# Patient Record
Sex: MALE | Race: WHITE | NOT HISPANIC OR LATINO | Employment: OTHER | URBAN - METROPOLITAN AREA
[De-identification: names, ages, dates, MRNs, and addresses within clinical notes are randomized per-mention and may not be internally consistent; named-entity substitution may affect disease eponyms.]

---

## 2017-01-04 ENCOUNTER — TELEPHONE (OUTPATIENT)
Dept: FAMILY MEDICINE | Facility: CLINIC | Age: 62
End: 2017-01-04

## 2017-01-04 DIAGNOSIS — R73.9 HYPERGLYCEMIA: Primary | ICD-10-CM

## 2017-01-04 NOTE — TELEPHONE ENCOUNTER
Your lipid profile was normal. Your hepatitis C test was normal. Your liver test were elevated for an undetermined cause. Your thyroid test was normal.      Your testosterone level was normal. Your glucose was 240 which is in the diabetes range. I suggest a Hgb A1C for further evaluation of this finding.

## 2017-01-24 ENCOUNTER — TELEPHONE (OUTPATIENT)
Dept: FAMILY MEDICINE | Facility: CLINIC | Age: 62
End: 2017-01-24

## 2017-01-24 DIAGNOSIS — E11.65 TYPE 2 DIABETES MELLITUS WITH HYPERGLYCEMIA, WITHOUT LONG-TERM CURRENT USE OF INSULIN: Primary | ICD-10-CM

## 2017-01-24 RX ORDER — METFORMIN HYDROCHLORIDE 850 MG/1
850 TABLET ORAL 2 TIMES DAILY WITH MEALS
Qty: 60 TABLET | Refills: 12 | Status: SHIPPED | OUTPATIENT
Start: 2017-01-24 | End: 2017-01-24 | Stop reason: SDUPTHER

## 2017-01-24 RX ORDER — METFORMIN HYDROCHLORIDE 850 MG/1
850 TABLET ORAL 2 TIMES DAILY WITH MEALS
Qty: 60 TABLET | Refills: 12 | Status: SHIPPED | OUTPATIENT
Start: 2017-01-24 | End: 2018-05-06 | Stop reason: ALTCHOICE

## 2017-01-24 NOTE — TELEPHONE ENCOUNTER
I sent a prescription to your pharmacy. He is due for a Hgb A1C. I suggest a referral to the diabetes program.

## 2017-01-24 NOTE — TELEPHONE ENCOUNTER
----- Message from Oma Vasquez sent at 1/24/2017 11:04 AM CST -----  Wife//edmund   [phone]  Wife calling about blood sugar is high    Wants to speak to doctor or nurse about a chronic cough

## 2017-01-24 NOTE — TELEPHONE ENCOUNTER
Patient has been monitoring his blood sugar: yesterday and this am reading was 288, both days prior to eating. And for two weeks, patient has been eating better and watching what he eats. Pt will begin taking organic [place] cinnamon 1,000mg capsules, twice daily. Patient wants to know what type to treatment do you want to start him on, please advise.

## 2017-07-21 ENCOUNTER — OFFICE VISIT (OUTPATIENT)
Dept: URGENT CARE | Facility: CLINIC | Age: 62
End: 2017-07-21
Payer: COMMERCIAL

## 2017-07-21 VITALS
BODY MASS INDEX: 31.83 KG/M2 | SYSTOLIC BLOOD PRESSURE: 133 MMHG | HEART RATE: 82 BPM | OXYGEN SATURATION: 98 % | HEIGHT: 72 IN | TEMPERATURE: 99 F | WEIGHT: 235 LBS | RESPIRATION RATE: 16 BRPM | DIASTOLIC BLOOD PRESSURE: 88 MMHG

## 2017-07-21 DIAGNOSIS — M62.838 MUSCLE SPASM OF RIGHT SHOULDER: Primary | ICD-10-CM

## 2017-07-21 DIAGNOSIS — M62.838 NECK MUSCLE SPASM: ICD-10-CM

## 2017-07-21 PROCEDURE — 96372 THER/PROPH/DIAG INJ SC/IM: CPT | Mod: S$GLB,,, | Performed by: PHYSICIAN ASSISTANT

## 2017-07-21 PROCEDURE — 99214 OFFICE O/P EST MOD 30 MIN: CPT | Mod: 25,S$GLB,, | Performed by: PHYSICIAN ASSISTANT

## 2017-07-21 RX ORDER — METHOCARBAMOL 500 MG/1
500 TABLET, FILM COATED ORAL 4 TIMES DAILY
Qty: 30 TABLET | Refills: 0 | Status: SHIPPED | OUTPATIENT
Start: 2017-07-21 | End: 2017-07-31

## 2017-07-21 RX ORDER — KETOROLAC TROMETHAMINE 30 MG/ML
60 INJECTION, SOLUTION INTRAMUSCULAR; INTRAVENOUS
Status: DISCONTINUED | OUTPATIENT
Start: 2017-07-21 | End: 2017-07-21

## 2017-07-21 RX ORDER — KETOROLAC TROMETHAMINE 30 MG/ML
60 INJECTION, SOLUTION INTRAMUSCULAR; INTRAVENOUS
Status: COMPLETED | OUTPATIENT
Start: 2017-07-21 | End: 2017-07-21

## 2017-07-21 RX ORDER — NAPROXEN 500 MG/1
500 TABLET ORAL 2 TIMES DAILY WITH MEALS
Qty: 60 TABLET | Refills: 0 | Status: SHIPPED | OUTPATIENT
Start: 2017-07-21 | End: 2017-08-20

## 2017-07-21 RX ADMIN — KETOROLAC TROMETHAMINE 60 MG: 30 INJECTION, SOLUTION INTRAMUSCULAR; INTRAVENOUS at 06:07

## 2017-07-21 NOTE — PATIENT INSTRUCTIONS
Muscle Spasm  A muscle spasm (also called a cramp) is an involuntary muscle contraction. The muscle tightens quickly and strongly. A hard lump may form in the muscle. Muscle spasms are very painful. Read on to learn more about muscle spasms and how to treat and prevent them.    What causes muscles to spasm?  Often, the cause of a muscle spasm is not known. Muscle spasm is due to irritation of muscle fibers. Some things can make a muscle spasm more likely. These include:  · Injury  · Heavy exercise  · Overtired muscles  · A muscle held in one position for a long time  · Dehydration  · Low levels of certain minerals in the body  · Taking certain medications, such as diuretics or water pills  · Certain medical conditions, such as kidney failure or diabetes  · Being pregnant  Stopping a muscle spasm  Muscle spasms often come and go quickly. When a muscle goes into spasm, very gently stretch and massage the muscle. This may help calm the muscle fibers. Then rest the muscle.  Preventing muscle spasms  Although there is little or no evidence that staying hydrated, taking certain vitamins or minerals or stretching works to prevent cramps, these measures may help and have other benefits. Talk to your health care provider about steps to take to avoid muscle spasms. These may include:  · Drinking enough fluids to avoid dehydration, especially when you exercise.  · Taking vitamin or mineral supplements.  · Getting regular exercise.  · Stretching regularly, especially before exercise.  · Limit caffeine and smoking.  · Taking a prescription muscle relaxant.  When to call your doctor  Call your doctor if you have any of the following:  · Severe cramping  · Cramping that lasts a long time, does not go away with stretching, or keeps coming back  · Pain, tingling, or weakness in the arms or legs  · Pain that wakes you up at night   Date Last Reviewed: 9/1/2015  © 3526-2169 [employer]. [address] [place] [address], [facility]  [place] [address]. All rights reserved. This information is not intended as a substitute for professional medical care. Always follow your healthcare professional's instructions.        Neck Spasm    A spasm of the neck muscles can happen after a sudden awkward neck movement. Sleeping with your neck in a crooked position can also cause spasm. Some people respond to emotional stress by tensing the muscles of their neck, shoulders, and upper back. If neck spasm lasts long enough, it can cause headache.  The treatment described below will usually help the pain to go away in 5 to 7 days. Pain that continues may need further evaluation or other types of treatment such as physical therapy.  Home care  · Rest and relax the muscles. Use a comfortable pillow that supports the head and keeps the spine in a neutral position. The position of the head should not be tilted forward or backward. A rolled up towel may help for a custom fit.  · Some people find relief with heat. Heat can be applied with either a warm shower or bath or a moist towel heated in the microwave and massage. Others prefer cold packs. You can make an ice pack by filling a plastic bag that seals at the top with ice cubes or crushed ice and then wrapping it with a thin towel. Try both and use the method that feels best for 15 to 20 minutes, several times a day.  · Whether using ice or heat, be careful that you do not injure your skin. Never put ice directly on the skin. Always wrap the ice in a towel or other type of cloth. This is very important, especially in people with poor skin sensations.  · Try to reduce your stress level. Emotional stress can lead to neck muscle tension and get in the way of or delay the healing process.  · You may use over-the-counter pain medicine to control pain, unless another medicine was prescribed.If you have chronic liver or kidney disease or ever had a stomach ulcer or GI bleeding, talk with your healthcare provider before using these  medicines.  Follow-up care  Follow up with your healthcare provider if your symptoms do not show signs of improvement after one week. Physical therapy or further tests may be needed.  If X-rays, CT scans, or MRI scans were taken, you will be told of any new findings that may affect your care.  Call 911  Call 911 if you have:  · Sudden weakness or numbness in one or both arms  · Neck swelling, difficulty or painful swallowing  · Difficulty breathing  · Chest pain  When to seek medical advice  Call your healthcare provider right away if any of these occur:  · Pain becomes worse or spreads into one or both arms  · Increasing headache with nausea or vomiting  · Fever of 100.4°F (38°C) or above lasting for 24 to 48 hours  Date Last Reviewed: 11/21/2015 © 2000-2016 [employer]. [address] [place] [address], [place] [address]. All rights reserved. This information is not intended as a substitute for professional medical care. Always follow your healthcare professional's instructions.      Please follow up with your Primary care provider within 2-5 days if your signs ans symptoms have not resolved or worsen.     If your condition worsens or fails to improve we recommend that you receive another evaluation at the emergency room immediately or contact your primary medical clinic to discuss your concerns.   You must understand that you have received an Urgent Care treatment only and that you may be released before all of your medical problems are known or treated. You, the patient, will arrange for follow up care as instructed.       Discussed with patient that he will wait to begin Naproxen tomorrow.

## 2017-07-21 NOTE — PROGRESS NOTES
Subjective:       Patient ID: Sj Gonzalez is a 62 y.o. male.    Vitals:  height is 6' (1.829 m) and weight is 106.6 kg (235 lb). His oral temperature is 99.2 °F (37.3 °C). His blood pressure is 133/88 and his pulse is 82. His respiration is 16 and oxygen saturation is 98%.     Chief Complaint: Neck Pain and Shoulder Pain    Neck Pain    This is a new problem. The current episode started yesterday. The problem occurs daily. The problem has been gradually worsening. The pain is associated with lifting a heavy object. The pain is present in the right side. The quality of the pain is described as aching and burning. The pain is at a severity of 10/10. The symptoms are aggravated by bending, position and twisting. The pain is worse during the day. Stiffness is present all day. Pertinent negatives include no chest pain, fever or headaches. He has tried ice and chiropractic manipulation for the symptoms. The treatment provided mild relief.   Shoulder Pain    The pain is present in the neck and right shoulder. This is a new problem. The current episode started yesterday. There has been no history of extremity trauma. The problem occurs daily. The problem has been gradually worsening. The pain is at a severity of 10/10. The pain is severe. Pertinent negatives include no fever or headaches. The symptoms are aggravated by activity and standing. He has tried rest and cold for the symptoms. The treatment provided mild relief.     Review of Systems   Constitution: Negative for chills and fever.   HENT: Negative for headaches and sore throat.    Eyes: Negative for blurred vision.   Cardiovascular: Negative for chest pain.   Respiratory: Negative for shortness of breath.    Skin: Negative for rash.   Musculoskeletal: Positive for joint pain and neck pain. Negative for back pain.   Gastrointestinal: Negative for abdominal pain, diarrhea, nausea and vomiting.   Psychiatric/Behavioral: The patient is not nervous/anxious.         Objective:      Physical Exam   Constitutional: He is oriented to person, place, and time. Vital signs are normal. He appears well-developed and well-nourished. He is active and cooperative.  Non-toxic appearance. He does not appear ill. No distress.   HENT:   Head: Normocephalic and atraumatic.   Right Ear: Hearing, tympanic membrane, external ear and ear canal normal.   Left Ear: Hearing, tympanic membrane, external ear and ear canal normal.   Nose: Nose normal. No mucosal edema, rhinorrhea or nasal deformity. No epistaxis. Right sinus exhibits no maxillary sinus tenderness and no frontal sinus tenderness. Left sinus exhibits no maxillary sinus tenderness and no frontal sinus tenderness.   Mouth/Throat: Uvula is midline, oropharynx is clear and moist and mucous membranes are normal. No trismus in the jaw. Normal dentition. No uvula swelling. No posterior oropharyngeal erythema.   Eyes: Conjunctivae and lids are normal. Right eye exhibits no discharge. Left eye exhibits no discharge. No scleral icterus.   Sclera clear bilat   Neck: Trachea normal, normal range of motion, full passive range of motion without pain and phonation normal. Neck supple. Muscular tenderness present. No spinous process tenderness present. No neck rigidity. No edema, no erythema and normal range of motion present. No Brudzinski's sign and no Kernig's sign noted.       Cardiovascular: Normal rate, regular rhythm, normal heart sounds, intact distal pulses and normal pulses.    Pulmonary/Chest: Effort normal and breath sounds normal. No respiratory distress.   Abdominal: Soft. Normal appearance and bowel sounds are normal. He exhibits no distension, no abdominal bruit, no pulsatile midline mass and no mass. There is no tenderness.   Musculoskeletal: Normal range of motion. He exhibits no edema or deformity.   Neurological: He is alert and oriented to person, place, and time. He has normal strength and normal reflexes. No sensory deficit. He  exhibits normal muscle tone. Coordination normal.   Skin: Skin is warm, dry and intact. He is not diaphoretic. No pallor.   Psychiatric: He has a normal mood and affect. His speech is normal and behavior is normal. Judgment and thought content normal. Cognition and memory are normal.   Nursing note and vitals reviewed.      Assessment:       1. Muscle spasm of right shoulder    2. Neck muscle spasm        Plan:         Muscle spasm of right shoulder  -     Discontinue: ketorolac injection 60 mg; Inject 2 mLs (60 mg total) into the muscle one time.  -     methocarbamol (ROBAXIN) 500 MG Tab; Take 1 tablet (500 mg total) by mouth 4 (four) times daily. As needed for muscle spasm. May cause drowsiness  Dispense: 30 tablet; Refill: 0  -     naproxen (NAPROSYN) 500 MG tablet; Take 1 tablet (500 mg total) by mouth 2 (two) times daily with meals.  Dispense: 60 tablet; Refill: 0  -     ketorolac injection 60 mg; Inject 2 mLs (60 mg total) into the muscle one time.    Neck muscle spasm  -     Discontinue: ketorolac injection 60 mg; Inject 2 mLs (60 mg total) into the muscle one time.  -     methocarbamol (ROBAXIN) 500 MG Tab; Take 1 tablet (500 mg total) by mouth 4 (four) times daily. As needed for muscle spasm. May cause drowsiness  Dispense: 30 tablet; Refill: 0  -     naproxen (NAPROSYN) 500 MG tablet; Take 1 tablet (500 mg total) by mouth 2 (two) times daily with meals.  Dispense: 60 tablet; Refill: 0  -     ketorolac injection 60 mg; Inject 2 mLs (60 mg total) into the muscle one time.

## 2017-09-19 ENCOUNTER — OFFICE VISIT (OUTPATIENT)
Dept: URGENT CARE | Facility: CLINIC | Age: 62
End: 2017-09-19
Payer: COMMERCIAL

## 2017-09-19 VITALS
BODY MASS INDEX: 31.83 KG/M2 | SYSTOLIC BLOOD PRESSURE: 140 MMHG | WEIGHT: 235 LBS | HEART RATE: 85 BPM | RESPIRATION RATE: 18 BRPM | DIASTOLIC BLOOD PRESSURE: 79 MMHG | HEIGHT: 72 IN | TEMPERATURE: 98 F | OXYGEN SATURATION: 97 %

## 2017-09-19 DIAGNOSIS — M70.21 OLECRANON BURSITIS OF RIGHT ELBOW: Primary | ICD-10-CM

## 2017-09-19 DIAGNOSIS — J06.9 VIRAL URI WITH COUGH: ICD-10-CM

## 2017-09-19 PROCEDURE — 3008F BODY MASS INDEX DOCD: CPT | Mod: S$GLB,,, | Performed by: PHYSICIAN ASSISTANT

## 2017-09-19 PROCEDURE — 3078F DIAST BP <80 MM HG: CPT | Mod: S$GLB,,, | Performed by: PHYSICIAN ASSISTANT

## 2017-09-19 PROCEDURE — 96372 THER/PROPH/DIAG INJ SC/IM: CPT | Mod: S$GLB,,, | Performed by: PHYSICIAN ASSISTANT

## 2017-09-19 PROCEDURE — 3077F SYST BP >= 140 MM HG: CPT | Mod: S$GLB,,, | Performed by: PHYSICIAN ASSISTANT

## 2017-09-19 PROCEDURE — 99214 OFFICE O/P EST MOD 30 MIN: CPT | Mod: 25,S$GLB,, | Performed by: PHYSICIAN ASSISTANT

## 2017-09-19 RX ORDER — BETAMETHASONE SODIUM PHOSPHATE AND BETAMETHASONE ACETATE 3; 3 MG/ML; MG/ML
6 INJECTION, SUSPENSION INTRA-ARTICULAR; INTRALESIONAL; INTRAMUSCULAR; SOFT TISSUE
Status: COMPLETED | OUTPATIENT
Start: 2017-09-19 | End: 2017-09-19

## 2017-09-19 RX ORDER — KETOROLAC TROMETHAMINE 30 MG/ML
60 INJECTION, SOLUTION INTRAMUSCULAR; INTRAVENOUS
Status: COMPLETED | OUTPATIENT
Start: 2017-09-19 | End: 2017-09-19

## 2017-09-19 RX ORDER — CLINDAMYCIN HYDROCHLORIDE 300 MG/1
300 CAPSULE ORAL 3 TIMES DAILY
Qty: 21 CAPSULE | Refills: 0 | Status: SHIPPED | OUTPATIENT
Start: 2017-09-19 | End: 2017-09-26

## 2017-09-19 RX ADMIN — KETOROLAC TROMETHAMINE 60 MG: 30 INJECTION, SOLUTION INTRAMUSCULAR; INTRAVENOUS at 05:09

## 2017-09-19 RX ADMIN — BETAMETHASONE SODIUM PHOSPHATE AND BETAMETHASONE ACETATE 6 MG: 3; 3 INJECTION, SUSPENSION INTRA-ARTICULAR; INTRALESIONAL; INTRAMUSCULAR; SOFT TISSUE at 05:09

## 2017-09-19 NOTE — PATIENT INSTRUCTIONS
Bursitis of the Elbow (Olecranon)  Your elbow joint contains a small fluid-filled sac called a bursa. The bursa helps the muscles and tendons move smoothly over the bone. It also cushions and protects your elbow. Bursitis is when the bursa is inflamed or swollen. This is most often due to overuse of or injury to the elbow. Symptoms include swelling and pain. If the elbow is red and feels warm to the touch, the bursa itself may be infected.  In most cases, elbow bursitis resolves with medicine and self-care at home. It may take several weeks for the bursa to heal and the swelling to go away. In some cases, your healthcare provider may drain excess fluid from the bursa. Or, he or she may inject medicine directly into the bursa to help relieve symptoms. In severe cases, you may need surgery to remove the bursa may. If there is concern that the bursa is infected, your healthcare provider may prescribe antibiotics to treat the infection.    Home care  Your healthcare provider may prescribe medicine to help relieve pain and swelling. This may be an over-the-counter pain reliever or prescription pain medicine. Take all medicines as directed. To help treat or prevent infection, your provider may prescribe antibiotics. If these are prescribed, take them as directed until they are gone.  The following are general care guidelines:  · Apply an ice pack or bag of frozen peas wrapped in a thin towel to your elbow for 15 to 20 minutes at a time. Do this 3 to 4 times a day until pain and swelling improve.  · Keep your elbow raised above the level of your heart whenever possible. This helps reduce swelling. When sitting or lying down, place your arm on a pillow that rests on your chest or on a pillow at your side.  · Use an elastic wrap around the elbow joint to compress the area while it is healing. Make the wrap snug but not tight to the point of causing pain.  · Rest your elbow to give it time to heal. You may need to wear an  elbow pad to help protect and limit the movement of your elbow. During and after healing, avoid leaning on your elbows.  Follow-up care  Follow up with your healthcare provider, or as advised. If you have been referred to a specialist, make that appointment promptly.  When to seek medical advice  Call your healthcare provider right away if any of these occur:  · Fever of 100.4°F (38°C) or higher, or as advised  · Chills  · Increased pain, swelling, warmth, redness, or drainage from the joint  · Trouble moving the elbow joint  · Numbness or tingling in the hand  · Severe pain or swelling in forearm or hand  · Loss of pink color and slow return of color after squeezing fingertip or hand  Date Last Reviewed: 6/1/2016 © 2000-2017 [employer]. [address] [place] [address], [place] [address]. All rights reserved. This information is not intended as a substitute for professional medical care. Always follow your healthcare professional's instructions.        Viral Upper Respiratory Illness (Adult)  You have a viral upper respiratory illness (URI), which is another term for the common cold. This illness is contagious during the first few days. It is spread through the air by coughing and sneezing. It may also be spread by direct contact (touching the sick person and then touching your own eyes, nose, or mouth). Frequent handwashing will decrease risk of spread. Most viral illnesses go away within 7 to 10 days with rest and simple home remedies. Sometimes the illness may last for several weeks. Antibiotics will not kill a virus, and they are generally not prescribed for this condition.    Home care  · If symptoms are severe, rest at home for the first 2 to 3 days. When you resume activity, don't let yourself get too tired.  · Avoid being exposed to cigarette smoke (yours or others).  · You may use acetaminophen or ibuprofen to control pain and fever, unless another medicine was prescribed. (Note: If you have chronic  liver or kidney disease, have ever had a stomach ulcer or gastrointestinal bleeding, or are taking blood-thinning medicines, talk with your healthcare provider before using these medicines.) Aspirin should never be given to anyone under 18 years of age who is ill with a viral infection or fever. It may cause severe liver or brain damage.  · Your appetite may be poor, so a light diet is fine. Avoid dehydration by drinking 6 to 8 glasses of fluids per day (water, soft drinks, juices, tea, or soup). Extra fluids will help loosen secretions in the nose and lungs.  · Over-the-counter cold medicines will not shorten the length of time youre sick, but they may be helpful for the following symptoms: cough, sore throat, and nasal and sinus congestion. (Note: Do not use decongestants if you have high blood pressure.)  Follow-up care  Follow up with your healthcare provider, or as advised.  When to seek medical advice  Call your healthcare provider right away if any of these occur:  · Cough with lots of colored sputum (mucus)  · Severe headache; face, neck, or ear pain  · Difficulty swallowing due to throat pain  · Fever of 100.4°F (38°C)  Call 911, or get immediate medical care  Call emergency services right away if any of these occur:  · Chest pain, shortness of breath, wheezing, or difficulty breathing  · Coughing up blood  · Inability to swallow due to throat pain  Date Last Reviewed: 9/13/2015  © 4991-3604 [employer]. [address] [place] [address]. All rights reserved. This information is not intended as a substitute for professional medical care. Always follow your healthcare professional's instructions.      Please follow up with your Primary care provider within 2-5 days if your signs and symptoms have not resolved or worsen.     If your condition worsens or fails to improve we recommend that you receive another evaluation at the emergency room immediately or contact your primary medical  clinic to discuss your concerns.   You must understand that you have received an Urgent Care treatment only and that you may be released before all of your medical problems are known or treated. You, the patient, will arrange for follow up care as instructed.

## 2017-09-19 NOTE — PROGRESS NOTES
Subjective:       Patient ID: Sj Gonzalez is a 62 y.o. male.    Vitals:  height is 6' (1.829 m) and weight is 106.6 kg (235 lb). His oral temperature is 98.4 °F (36.9 °C). His blood pressure is 140/79 (abnormal) and his pulse is 85. His respiration is 18 and oxygen saturation is 97%.     Chief Complaint: Cough and Abscess    Been having a cough for a little over a week with congestion. In the last 3 days he has had a knot on the right elbow that keeps getting bigger and hot to the touch, Extremely painful with movement and touch.      Cough   This is a new problem. The current episode started in the past 7 days. The problem has been unchanged. The problem occurs hourly. The cough is non-productive. Associated symptoms include ear congestion, headaches, postnasal drip and a sore throat. Pertinent negatives include no chest pain, chills, fever, rash or shortness of breath. Nothing aggravates the symptoms. He has tried OTC cough suppressant for the symptoms. The treatment provided mild relief. There is no history of environmental allergies.   Abscess   Chronicity:  NewProgression Since Onset: gradually worsening  Location:  Shoulder/arm (right elbow\)  Associated Symptoms: no fever, no chills  Characteristics: painful, redness and swelling    Pain Scale:  10/10  Treatments Tried:  NSAIDs and warm compresses  Relieved by:  Nothing    Review of Systems   Constitution: Negative for chills and fever.   HENT: Positive for postnasal drip and sore throat.    Eyes: Negative for blurred vision.   Cardiovascular: Negative for chest pain.   Respiratory: Positive for cough. Negative for shortness of breath.    Skin: Negative for rash.   Musculoskeletal: Positive for joint swelling (right elbow). Negative for back pain and joint pain.   Gastrointestinal: Negative for abdominal pain, diarrhea, nausea and vomiting.   Neurological: Positive for headaches.   Psychiatric/Behavioral: The patient is not nervous/anxious.     Allergic/Immunologic: Negative for environmental allergies.       Objective:      Physical Exam   Constitutional: He is oriented to person, place, and time. He appears well-developed and well-nourished. He is cooperative.  Non-toxic appearance. He does not appear ill. No distress.   HENT:   Head: Normocephalic and atraumatic.   Right Ear: Hearing, external ear and ear canal normal. A middle ear effusion is present.   Left Ear: Hearing, external ear and ear canal normal. A middle ear effusion is present.   Nose: Rhinorrhea present. No mucosal edema or nasal deformity. No epistaxis. Right sinus exhibits no maxillary sinus tenderness and no frontal sinus tenderness. Left sinus exhibits no maxillary sinus tenderness and no frontal sinus tenderness.   Mouth/Throat: Uvula is midline and mucous membranes are normal. No trismus in the jaw. Normal dentition. No uvula swelling. Posterior oropharyngeal erythema present.   Eyes: Conjunctivae and lids are normal. Right eye exhibits no discharge. Left eye exhibits no discharge. No scleral icterus.   Sclera clear bilat   Neck: Trachea normal, normal range of motion, full passive range of motion without pain and phonation normal. Neck supple.   Cardiovascular: Normal rate, regular rhythm, normal heart sounds, intact distal pulses and normal pulses.    Pulmonary/Chest: Effort normal and breath sounds normal. No respiratory distress. He has no decreased breath sounds. He has no wheezes. He has no rhonchi. He has no rales.   Abdominal: Soft. Normal appearance and bowel sounds are normal. He exhibits no distension, no pulsatile midline mass and no mass. There is no tenderness.   Musculoskeletal: Normal range of motion. He exhibits no edema or deformity.        Right elbow: He exhibits swelling and effusion. He exhibits normal range of motion, no deformity and no laceration. Tenderness found. Olecranon process tenderness noted. No radial head, no medial epicondyle and no lateral  epicondyle tenderness noted.        Arms:  No erythema or warmth noted   Neurological: He is alert and oriented to person, place, and time. He exhibits normal muscle tone. Coordination normal.   Skin: Skin is warm, dry and intact. He is not diaphoretic. No pallor.   Psychiatric: He has a normal mood and affect. His speech is normal and behavior is normal. Judgment and thought content normal. Cognition and memory are normal.   Nursing note and vitals reviewed.      Assessment:       1. Olecranon bursitis of right elbow    2. Viral URI with cough        Plan:         Olecranon bursitis of right elbow  -     betamethasone acetate-betamethasone sodium phosphate injection 6 mg; Inject 1 mL (6 mg total) into the muscle one time.  -     ketorolac injection 60 mg; Inject 2 mLs (60 mg total) into the muscle one time.  -     clindamycin (CLEOCIN) 300 MG capsule; Take 1 capsule (300 mg total) by mouth 3 (three) times daily.  Dispense: 21 capsule; Refill: 0    Viral URI with cough  -     betamethasone acetate-betamethasone sodium phosphate injection 6 mg; Inject 1 mL (6 mg total) into the muscle one time.        Bursitis of the Elbow (Olecranon)  Your elbow joint contains a small fluid-filled sac called a bursa. The bursa helps the muscles and tendons move smoothly over the bone. It also cushions and protects your elbow. Bursitis is when the bursa is inflamed or swollen. This is most often due to overuse of or injury to the elbow. Symptoms include swelling and pain. If the elbow is red and feels warm to the touch, the bursa itself may be infected.  In most cases, elbow bursitis resolves with medicine and self-care at home. It may take several weeks for the bursa to heal and the swelling to go away. In some cases, your healthcare provider may drain excess fluid from the bursa. Or, he or she may inject medicine directly into the bursa to help relieve symptoms. In severe cases, you may need surgery to remove the bursa may. If  there is concern that the bursa is infected, your healthcare provider may prescribe antibiotics to treat the infection.    Home care  Your healthcare provider may prescribe medicine to help relieve pain and swelling. This may be an over-the-counter pain reliever or prescription pain medicine. Take all medicines as directed. To help treat or prevent infection, your provider may prescribe antibiotics. If these are prescribed, take them as directed until they are gone.  The following are general care guidelines:  · Apply an ice pack or bag of frozen peas wrapped in a thin towel to your elbow for 15 to 20 minutes at a time. Do this 3 to 4 times a day until pain and swelling improve.  · Keep your elbow raised above the level of your heart whenever possible. This helps reduce swelling. When sitting or lying down, place your arm on a pillow that rests on your chest or on a pillow at your side.  · Use an elastic wrap around the elbow joint to compress the area while it is healing. Make the wrap snug but not tight to the point of causing pain.  · Rest your elbow to give it time to heal. You may need to wear an elbow pad to help protect and limit the movement of your elbow. During and after healing, avoid leaning on your elbows.  Follow-up care  Follow up with your healthcare provider, or as advised. If you have been referred to a specialist, make that appointment promptly.  When to seek medical advice  Call your healthcare provider right away if any of these occur:  · Fever of 100.4°F (38°C) or higher, or as advised  · Chills  · Increased pain, swelling, warmth, redness, or drainage from the joint  · Trouble moving the elbow joint  · Numbness or tingling in the hand  · Severe pain or swelling in forearm or hand  · Loss of pink color and slow return of color after squeezing fingertip or hand  Date Last Reviewed: 6/1/2016  © 2903-3406 [employer]. [address] [place] [address], [place] [address]. All rights reserved.  This information is not intended as a substitute for professional medical care. Always follow your healthcare professional's instructions.        Viral Upper Respiratory Illness (Adult)  You have a viral upper respiratory illness (URI), which is another term for the common cold. This illness is contagious during the first few days. It is spread through the air by coughing and sneezing. It may also be spread by direct contact (touching the sick person and then touching your own eyes, nose, or mouth). Frequent handwashing will decrease risk of spread. Most viral illnesses go away within 7 to 10 days with rest and simple home remedies. Sometimes the illness may last for several weeks. Antibiotics will not kill a virus, and they are generally not prescribed for this condition.    Home care  · If symptoms are severe, rest at home for the first 2 to 3 days. When you resume activity, don't let yourself get too tired.  · Avoid being exposed to cigarette smoke (yours or others).  · You may use acetaminophen or ibuprofen to control pain and fever, unless another medicine was prescribed. (Note: If you have chronic liver or kidney disease, have ever had a stomach ulcer or gastrointestinal bleeding, or are taking blood-thinning medicines, talk with your healthcare provider before using these medicines.) Aspirin should never be given to anyone under 18 years of age who is ill with a viral infection or fever. It may cause severe liver or brain damage.  · Your appetite may be poor, so a light diet is fine. Avoid dehydration by drinking 6 to 8 glasses of fluids per day (water, soft drinks, juices, tea, or soup). Extra fluids will help loosen secretions in the nose and lungs.  · Over-the-counter cold medicines will not shorten the length of time youre sick, but they may be helpful for the following symptoms: cough, sore throat, and nasal and sinus congestion. (Note: Do not use decongestants if you have high blood  pressure.)  Follow-up care  Follow up with your healthcare provider, or as advised.  When to seek medical advice  Call your healthcare provider right away if any of these occur:  · Cough with lots of colored sputum (mucus)  · Severe headache; face, neck, or ear pain  · Difficulty swallowing due to throat pain  · Fever of 100.4°F (38°C)  Call 911, or get immediate medical care  Call emergency services right away if any of these occur:  · Chest pain, shortness of breath, wheezing, or difficulty breathing  · Coughing up blood  · Inability to swallow due to throat pain  Date Last Reviewed: 9/13/2015  © 1841-6024 [employer]. [address] [place] [address], [place] [address]. All rights reserved. This information is not intended as a substitute for professional medical care. Always follow your healthcare professional's instructions.    YOU HAVE BEEN GIVEN A PRESCRIPTION FOR ANTIBIOTICS. IT WAS ADVISED TO DELAY THE COURSE OF ANTIBIOTICS FOR 2-3 DAYS IF SYMPTOMS DO NOT RESOLVE. IT IMPORTANT TO FOLLOW THESE INSTRUCTIONS AS ANTIBIOTIC RESISTANCE IS HIGH. YOUR SYMPTOMS WILL LIKELY RESOLVE WITHOUT THIS PRESCRIPTIONS.     Please follow up with your Primary care provider within 2-5 days if your signs and symptoms have not resolved or worsen.     If your condition worsens or fails to improve we recommend that you receive another evaluation at the emergency room immediately or contact your primary medical clinic to discuss your concerns.   You must understand that you have received an Urgent Care treatment only and that you may be released before all of your medical problems are known or treated. You, the patient, will arrange for follow up care as instructed.

## 2017-09-21 ENCOUNTER — TELEPHONE (OUTPATIENT)
Dept: URGENT CARE | Facility: CLINIC | Age: 62
End: 2017-09-21

## 2018-02-01 RX ORDER — AMLODIPINE BESYLATE 10 MG/1
TABLET ORAL
Qty: 90 TABLET | Refills: 3 | Status: SHIPPED | OUTPATIENT
Start: 2018-02-01 | End: 2018-08-13 | Stop reason: SDUPTHER

## 2018-02-01 RX ORDER — LISINOPRIL AND HYDROCHLOROTHIAZIDE 12.5; 2 MG/1; MG/1
TABLET ORAL
Qty: 90 TABLET | Refills: 3 | Status: SHIPPED | OUTPATIENT
Start: 2018-02-01 | End: 2018-08-13 | Stop reason: SDUPTHER

## 2018-05-06 ENCOUNTER — OFFICE VISIT (OUTPATIENT)
Dept: URGENT CARE | Facility: CLINIC | Age: 63
End: 2018-05-06
Payer: COMMERCIAL

## 2018-05-06 VITALS
WEIGHT: 235 LBS | HEIGHT: 72 IN | BODY MASS INDEX: 31.83 KG/M2 | HEART RATE: 80 BPM | SYSTOLIC BLOOD PRESSURE: 133 MMHG | OXYGEN SATURATION: 97 % | RESPIRATION RATE: 18 BRPM | TEMPERATURE: 98 F | DIASTOLIC BLOOD PRESSURE: 79 MMHG

## 2018-05-06 DIAGNOSIS — H66.92 LEFT OTITIS MEDIA, UNSPECIFIED OTITIS MEDIA TYPE: Primary | ICD-10-CM

## 2018-05-06 DIAGNOSIS — R05.9 COUGH: ICD-10-CM

## 2018-05-06 DIAGNOSIS — Z87.438 HISTORY OF ERECTILE DYSFUNCTION: ICD-10-CM

## 2018-05-06 DIAGNOSIS — Z76.89 ENCOUNTER TO ESTABLISH CARE WITH NEW DOCTOR: ICD-10-CM

## 2018-05-06 DIAGNOSIS — Z86.39 HISTORY OF DIABETES MELLITUS, TYPE II: ICD-10-CM

## 2018-05-06 LAB — GLUCOSE SERPL-MCNC: 365 MG/DL (ref 70–110)

## 2018-05-06 PROCEDURE — 3078F DIAST BP <80 MM HG: CPT | Mod: CPTII,S$GLB,, | Performed by: NURSE PRACTITIONER

## 2018-05-06 PROCEDURE — 82948 REAGENT STRIP/BLOOD GLUCOSE: CPT | Mod: S$GLB,,, | Performed by: NURSE PRACTITIONER

## 2018-05-06 PROCEDURE — 3075F SYST BP GE 130 - 139MM HG: CPT | Mod: CPTII,S$GLB,, | Performed by: NURSE PRACTITIONER

## 2018-05-06 PROCEDURE — 3008F BODY MASS INDEX DOCD: CPT | Mod: CPTII,S$GLB,, | Performed by: NURSE PRACTITIONER

## 2018-05-06 PROCEDURE — 96372 THER/PROPH/DIAG INJ SC/IM: CPT | Mod: S$GLB,,, | Performed by: FAMILY MEDICINE

## 2018-05-06 PROCEDURE — 99214 OFFICE O/P EST MOD 30 MIN: CPT | Mod: 25,S$GLB,, | Performed by: NURSE PRACTITIONER

## 2018-05-06 RX ORDER — METFORMIN HYDROCHLORIDE 500 MG/1
500 TABLET ORAL
Qty: 30 TABLET | Refills: 11 | Status: SHIPPED | OUTPATIENT
Start: 2018-05-06 | End: 2018-05-06 | Stop reason: SDUPTHER

## 2018-05-06 RX ORDER — BENZONATATE 100 MG/1
200 CAPSULE ORAL 3 TIMES DAILY PRN
Qty: 20 CAPSULE | Refills: 0 | Status: SHIPPED | OUTPATIENT
Start: 2018-05-06 | End: 2018-05-23

## 2018-05-06 RX ORDER — AZITHROMYCIN 250 MG/1
TABLET, FILM COATED ORAL
Qty: 6 TABLET | Refills: 0 | Status: SHIPPED | OUTPATIENT
Start: 2018-05-06 | End: 2018-05-23

## 2018-05-06 RX ORDER — METFORMIN HYDROCHLORIDE 500 MG/1
500 TABLET ORAL 2 TIMES DAILY WITH MEALS
Qty: 180 TABLET | Refills: 0 | Status: ON HOLD | OUTPATIENT
Start: 2018-05-06 | End: 2018-08-16 | Stop reason: HOSPADM

## 2018-05-06 RX ORDER — DEXAMETHASONE SODIUM PHOSPHATE 100 MG/10ML
3 INJECTION INTRAMUSCULAR; INTRAVENOUS ONCE
Status: COMPLETED | OUTPATIENT
Start: 2018-05-06 | End: 2018-05-06

## 2018-05-06 RX ORDER — DEXAMETHASONE SODIUM PHOSPHATE 100 MG/10ML
6 INJECTION INTRAMUSCULAR; INTRAVENOUS ONCE
Status: DISCONTINUED | OUTPATIENT
Start: 2018-05-06 | End: 2018-05-06

## 2018-05-06 RX ADMIN — DEXAMETHASONE SODIUM PHOSPHATE 3 MG: 100 INJECTION INTRAMUSCULAR; INTRAVENOUS at 11:05

## 2018-05-06 NOTE — PROGRESS NOTES
Subjective:       Patient ID: Sj Gonzalez is a 62 y.o. male.    Vitals:  height is 6' (1.829 m) and weight is 106.6 kg (235 lb). His temperature is 98 °F (36.7 °C). His blood pressure is 133/79 and his pulse is 80. His respiration is 18 and oxygen saturation is 97%.     Chief Complaint: Sinus Problem    This is a 62 y.o. male with Past Medical History:  who presents today with a chief complaint of cough, congestinon and sore throat for 2 days.   Patient's metformin is  and wishes to have a refill of the medication.       Sinus Problem   This is a new problem. The current episode started in the past 7 days. The problem is unchanged. There has been no fever. Associated symptoms include congestion, coughing and sinus pressure. Pertinent negatives include no chills, ear pain, headaches, hoarse voice, shortness of breath or sore throat. Past treatments include oral decongestants. The treatment provided no relief.     Review of Systems   Constitution: Negative for chills, fever and malaise/fatigue.   HENT: Positive for congestion and sinus pressure. Negative for ear pain, hoarse voice and sore throat.    Eyes: Negative for discharge and redness.   Cardiovascular: Negative for chest pain, dyspnea on exertion and leg swelling.   Respiratory: Positive for cough. Negative for shortness of breath, sputum production and wheezing.    Musculoskeletal: Negative for myalgias.   Gastrointestinal: Negative for abdominal pain and nausea.   Neurological: Negative for headaches.       Objective:      Physical Exam   Constitutional: He is oriented to person, place, and time. He appears well-developed and well-nourished. He is cooperative.  Non-toxic appearance. He does not appear ill. No distress.   HENT:   Head: Normocephalic and atraumatic.   Right Ear: Hearing, tympanic membrane, external ear and ear canal normal.   Left Ear: Hearing, external ear and ear canal normal. Tympanic membrane is injected.   Nose: Mucosal edema and  rhinorrhea present. No nasal deformity. No epistaxis. Right sinus exhibits no maxillary sinus tenderness and no frontal sinus tenderness. Left sinus exhibits no maxillary sinus tenderness and no frontal sinus tenderness.   Mouth/Throat: Uvula is midline and mucous membranes are normal. No trismus in the jaw. Normal dentition. No uvula swelling. Oropharyngeal exudate present. No posterior oropharyngeal edema or posterior oropharyngeal erythema.   Eyes: Conjunctivae and lids are normal. No scleral icterus.   Sclera clear bilat   Neck: Trachea normal, full passive range of motion without pain and phonation normal. Neck supple.   Cardiovascular: Normal rate, regular rhythm, normal heart sounds, intact distal pulses and normal pulses.    Pulmonary/Chest: Effort normal and breath sounds normal. No respiratory distress.   Abdominal: Soft. Normal appearance and bowel sounds are normal. He exhibits no distension. There is no tenderness.   Musculoskeletal: Normal range of motion. He exhibits no edema or deformity.   Neurological: He is alert and oriented to person, place, and time. He exhibits normal muscle tone. Coordination normal.   Skin: Skin is warm, dry and intact. He is not diaphoretic. No pallor.   Psychiatric: He has a normal mood and affect. His speech is normal and behavior is normal. Judgment and thought content normal. Cognition and memory are normal.   Nursing note and vitals reviewed.      Assessment:       1. Left otitis media, unspecified otitis media type    2. History of diabetes mellitus, type II    3. Cough    4. Encounter to establish care with new doctor        Plan:       Patient has not been checking blood sugars.  Last BG in chart was 1 year ago.      Left otitis media, unspecified otitis media type  -     azithromycin (ZITHROMAX Z-TIFFANI) 250 MG tablet; Take 2 tablets (500 mg) on  Day 1,  followed by 1 tablet (250 mg) once daily on Days 2 through 5.  Dispense: 6 tablet; Refill: 0    History of diabetes  "mellitus, type II  -     POCT glucose  -     metFORMIN (GLUCOPHAGE) 500 MG tablet; Take 1 tablet (500 mg total) by mouth daily with breakfast.  Dispense: 30 tablet; Refill: 11    Cough  -     benzonatate (TESSALON PERLES) 100 MG capsule; Take 2 capsules (200 mg total) by mouth 3 (three) times daily as needed for Cough.  Dispense: 20 capsule; Refill: 0  -     Discontinue: dexamethasone injection 6 mg; Inject 0.6 mLs (6 mg total) into the muscle once.  -     dexamethasone injection 3 mg; Inject 0.3 mLs (3 mg total) into the muscle once.    Encounter to establish care with new doctor  -     Cancel: Ambulatory referral to Internal Medicine  -     Ambulatory referral to Internal Medicine      Results for orders placed or performed in visit on 05/06/18   POCT glucose   Result Value Ref Range    POC Glucose 365 (A) 70 - 110 mg/dL       Patient Instructions     Diabetes with High Blood Sugar  You have been treated for high blood sugar (hyperglycemia). This may be because of an infection or other illness, eating too many sweets or starches, or not taking enough insulin.  Home care  Monitor and write down your blood sugar level at least twice a day. Do this before breakfast and before dinner. If you take insulin, record your routine insulin dose as well. Also record any additional doses required based on your sliding scale. Do this for the next 3 to 5 days.  High blood sugar may cause symptoms that you can learn to recognize, such as:  · Frequent urination  · Thirst  · Dizziness  · Headache  · Shortness of breath  · Breath that smells fruity  · Nausea or vomiting  · Abdominal pain  · Drowsiness or loss of consciousness  If you have high-blood-sugar symptoms, use a blood or urine test to find out what your blood sugar level is. If it is above your usual range, use the "sliding scale" regular insulin dose prescribed by your healthcare provider. If you were not given a range for your insulin dose, contact your healthcare " "provider for more advice.  Follow-up care  Follow up with your healthcare provider, or as advised. You may need to meet with your healthcare provider in the next week. You will likely review your blood sugar records together. You may also talk to your provider about adjusting your dose of insulin or other medicine for blood sugar.  When to seek medical advice  Call your healthcare provider right away if these occur:  · High blood sugar symptoms (Symptoms are described above.)  · Blood sugar over 300 mg/dl If you cant reach your healthcare provider, go to a hospital emergency room or urgent care center  Date Last Reviewed: 6/1/2016 © 2000-2017 [employer]. [address] [place] [id number]. All rights reserved. This information is not intended as a substitute for professional medical care. Always follow your healthcare professional's instructions.    VIRAL:  Please follow up with your Primary care provider within 2-5 days if your signs and symptoms have not resolved or worsen.  The usual course of cold symptoms are 10-14 days.     If your condition worsens or fails to improve we recommend that you receive another evaluation at the emergency room immediately or contact your primary medical clinic to discuss your concerns.     You must understand that you have received an Urgent Care treatment only and that you may be released before all of your medical problems are known or treated.   You, the patient, will arrange for follow up care as instructed.     Tylenol or Ibuprofen can also be used as directed for pain/fever unless you have an allergy to them or medical condition such as stomach ulcers, kidney or liver disease or blood thinners etc for which you should not be taking these type of medications.     Take over the counter cough medication as directed as needed for cough.  You should avoid medications with pseudoephedrine or phenylephrine (any medication with "D") if you have high blood " pressure as this can cause an elevation in your blood pressure. Instead consider Corcidin HBP as needed to prevent an elevated blood pressure.     Natural remedies of symptoms (as needed) include humidification, saline nasal sprays, and/or steamy showers.  Increase fluids, warm tea with honey, cough drops as needed.  You may also use salt water gargles for sore throat.      Middle Ear Infection (Adult)  You have an infection of the middle ear, the space behind the eardrum. This is also called acute otitis media (AOM). Sometimes it is caused by the common cold. This is because congestion can block the internal passage (eustachian tube) that drains fluid from the middle ear. When the middle ear fills with fluid, bacteria can grow there and cause an infection. Oral antibiotics are used to treat this illness, not ear drops. Symptoms usually start to improve within 1 to 2 days of treatment.    Home care  The following are general care guidelines:  · Finish all of the antibiotic medicine given, even though you may feel better after the first few days.  · You may use over-the-counter medicine, such as acetaminophen or ibuprofen, to control pain and fever, unless something else was prescribed. If you have chronic liver or kidney disease or have ever had a stomach ulcer or gastrointestinal bleeding, talk with your healthcare provider before using these medicines. Do not give aspirin to anyone under 18 years of age who has a fever. It may cause severe illness or death.  Follow-up care  Follow up with your healthcare provider, or as advised, in 2 weeks if all symptoms have not gotten better, or if hearing doesn't go back to normal within 1 month.  When to seek medical advice  Call your healthcare provider right away if any of these occur:  · Ear pain gets worse or does not improve after 3 days of treatment  · Unusual drowsiness or confusion  · Neck pain, stiff neck, or headache  · Fluid or blood draining from the ear  canal  · Fever of 100.4°F (38°C) or as advised   · Seizure  Date Last Reviewed: 6/1/2016  © 9947-1686 [employer] [address] [place] [address], [place] [address]. All rights reserved. This information is not intended as a substitute for professional medical care. Always follow your healthcare professional's instructions.

## 2018-05-09 ENCOUNTER — TELEPHONE (OUTPATIENT)
Dept: URGENT CARE | Facility: CLINIC | Age: 63
End: 2018-05-09

## 2018-05-23 ENCOUNTER — OFFICE VISIT (OUTPATIENT)
Dept: INTERNAL MEDICINE | Facility: CLINIC | Age: 63
End: 2018-05-23
Payer: COMMERCIAL

## 2018-05-23 VITALS
HEIGHT: 72 IN | OXYGEN SATURATION: 97 % | HEART RATE: 77 BPM | WEIGHT: 231.38 LBS | RESPIRATION RATE: 18 BRPM | DIASTOLIC BLOOD PRESSURE: 60 MMHG | SYSTOLIC BLOOD PRESSURE: 110 MMHG | BODY MASS INDEX: 31.34 KG/M2

## 2018-05-23 DIAGNOSIS — E29.1 HYPOGONADISM, MALE: ICD-10-CM

## 2018-05-23 DIAGNOSIS — E66.09 NON MORBID OBESITY DUE TO EXCESS CALORIES: ICD-10-CM

## 2018-05-23 DIAGNOSIS — Z12.11 ENCOUNTER FOR SCREENING COLONOSCOPY: ICD-10-CM

## 2018-05-23 DIAGNOSIS — Z12.5 SCREENING FOR PROSTATE CANCER: ICD-10-CM

## 2018-05-23 DIAGNOSIS — I10 ESSENTIAL HYPERTENSION: Primary | ICD-10-CM

## 2018-05-23 DIAGNOSIS — Z13.6 SCREENING FOR CARDIOVASCULAR CONDITION: ICD-10-CM

## 2018-05-23 DIAGNOSIS — Z11.59 NEED FOR HEPATITIS C SCREENING TEST: ICD-10-CM

## 2018-05-23 DIAGNOSIS — E11.9 TYPE 2 DIABETES MELLITUS WITHOUT COMPLICATION, WITHOUT LONG-TERM CURRENT USE OF INSULIN: ICD-10-CM

## 2018-05-23 DIAGNOSIS — N52.9 ERECTILE DYSFUNCTION, UNSPECIFIED ERECTILE DYSFUNCTION TYPE: ICD-10-CM

## 2018-05-23 PROCEDURE — 3008F BODY MASS INDEX DOCD: CPT | Mod: CPTII,S$GLB,, | Performed by: INTERNAL MEDICINE

## 2018-05-23 PROCEDURE — 3078F DIAST BP <80 MM HG: CPT | Mod: CPTII,S$GLB,, | Performed by: INTERNAL MEDICINE

## 2018-05-23 PROCEDURE — 99214 OFFICE O/P EST MOD 30 MIN: CPT | Mod: S$GLB,,, | Performed by: INTERNAL MEDICINE

## 2018-05-23 PROCEDURE — 3074F SYST BP LT 130 MM HG: CPT | Mod: CPTII,S$GLB,, | Performed by: INTERNAL MEDICINE

## 2018-05-23 PROCEDURE — 99999 PR PBB SHADOW E&M-EST. PATIENT-LVL IV: CPT | Mod: PBBFAC,,, | Performed by: INTERNAL MEDICINE

## 2018-05-23 NOTE — PROGRESS NOTES
"Subjective:      Patient ID: Sj Gonzalez is a 62 y.o. male.    Chief Complaint: Cough (x 2 weeks) and Diabetes (high reading blood sugar yesterday 288)    HPI: 62 y.o. White male, pt of Dr. Cunningham'.  I have reviewed his chart.  No labs since 1/17.  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  Does not exercise, due to knee issues and work.    DX:  -T2DM  -ED, asking re: injectors or implants, since enhancers did not work.  -HTN    -Transaminitis      Review of Systems   Constitutional: Positive for activity change and appetite change.   HENT: Negative.    Eyes: Positive for visual disturbance.   Respiratory: Positive for chest tightness and shortness of breath.         States he is deconditioned.   Cardiovascular: Negative.    Gastrointestinal: Negative.    Endocrine: Negative.    Genitourinary:        ED, states none of the enhancers works for him,  States he had a low Testosternone level also   Musculoskeletal: Positive for arthralgias, back pain, gait problem and joint swelling.   Allergic/Immunologic: Negative.    Hematological: Negative.    Psychiatric/Behavioral: Negative.        Objective:   /60 (BP Location: Right arm, Patient Position: Sitting, BP Method: Large (Manual))   Pulse 77   Resp 18   Ht 6' (1.829 m)   Wt 105 kg (231 lb 6.4 oz)   SpO2 97%   BMI 31.38 kg/m²     Physical Exam   Constitutional: He is oriented to person, place, and time. He appears well-developed and well-nourished.   HENT:   Head: Normocephalic and atraumatic.   Right Ear: External ear normal.   Left Ear: External ear normal.   Nose: Nose normal.   Mouth/Throat: Oropharynx is clear and moist.   Eyes: Conjunctivae and EOM are normal. Pupils are equal, round, and reactive to light.   Neck: Normal range of motion. Neck supple.   Cardiovascular: Normal rate, regular rhythm and normal heart sounds.    Pulmonary/Chest: Effort normal and breath sounds normal.   Abdominal: Soft. " Bowel sounds are normal.   Musculoskeletal: Normal range of motion.   Neurological: He is alert and oriented to person, place, and time.   Skin: Skin is warm and dry.   Psychiatric: He has a normal mood and affect. His behavior is normal.   Nursing note and vitals reviewed.      Assessment:     1. Essential hypertension    2. Type 2 diabetes mellitus without complication, without long-term current use of insulin    3. Screening for cardiovascular condition    4. Screening for prostate cancer    5. Encounter for screening colonoscopy    6. Non morbid obesity due to excess calories    7. Hypogonadism, male    8. Need for hepatitis C screening test    9. Erectile dysfunction, unspecified erectile dysfunction type    10. BMI 31.0-31.9,adult      Plan:     Essential hypertension  -     CBC auto differential; Future; Expected date: 05/23/2018  -     Comprehensive metabolic panel; Future; Expected date: 05/23/2018  -     TSH; Future; Expected date: 05/23/2018  -     Urinalysis; Future; Expected date: 05/23/2018  -     EKG 12-lead; Future; Expected date: 05/23/2018  -     2D echo with color flow doppler; Future  -     Radiology US Carotid Bilateral; Future; Expected date: 05/23/2018    Type 2 diabetes mellitus without complication, without long-term current use of insulin  -     Hemoglobin A1c; Future; Expected date: 05/23/2018  -     TSH; Future; Expected date: 05/23/2018  -     Microalbumin/creatinine urine ratio; Future; Expected date: 05/23/2018    Screening for cardiovascular condition  -     Lipid panel; Future; Expected date: 05/23/2018  -     2D echo with color flow doppler; Future  -     Radiology US Carotid Bilateral; Future; Expected date: 05/23/2018    Screening for prostate cancer  -     PSA, Screening; Future; Expected date: 05/23/2018    Encounter for screening colonoscopy  -     Case request GI: COLONOSCOPY    Non morbid obesity due to excess calories  -     TSH; Future; Expected date:  05/23/2018    Hypogonadism, male  -     Testosterone; Future; Expected date: 05/23/2018  -     DXA Bone Density Spine And Hip; Future; Expected date: 05/23/2018    Need for hepatitis C screening test  -     Hepatitis C antibody; Future; Expected date: 05/23/2018    Erectile dysfunction, unspecified erectile dysfunction type    BMI 31.0-31.9,adult

## 2018-05-25 DIAGNOSIS — Z12.11 COLON CANCER SCREENING: ICD-10-CM

## 2018-05-29 ENCOUNTER — INITIAL CONSULT (OUTPATIENT)
Dept: UROLOGY | Facility: CLINIC | Age: 63
End: 2018-05-29
Payer: COMMERCIAL

## 2018-05-29 VITALS
SYSTOLIC BLOOD PRESSURE: 116 MMHG | WEIGHT: 231.5 LBS | BODY MASS INDEX: 31.36 KG/M2 | HEIGHT: 72 IN | HEART RATE: 81 BPM | DIASTOLIC BLOOD PRESSURE: 74 MMHG

## 2018-05-29 DIAGNOSIS — R79.89 LOW TESTOSTERONE: ICD-10-CM

## 2018-05-29 DIAGNOSIS — N52.9 ERECTILE DYSFUNCTION, UNSPECIFIED ERECTILE DYSFUNCTION TYPE: Primary | ICD-10-CM

## 2018-05-29 DIAGNOSIS — R35.1 NOCTURIA: ICD-10-CM

## 2018-05-29 PROCEDURE — 99999 PR PBB SHADOW E&M-EST. PATIENT-LVL III: CPT | Mod: PBBFAC,,, | Performed by: UROLOGY

## 2018-05-29 PROCEDURE — 99244 OFF/OP CNSLTJ NEW/EST MOD 40: CPT | Mod: S$GLB,,, | Performed by: UROLOGY

## 2018-05-29 RX ORDER — TESTOSTERONE GEL, 1% 10 MG/G
5 GEL TRANSDERMAL DAILY
Qty: 30 PACKET | Refills: 5 | Status: SHIPPED | OUTPATIENT
Start: 2018-05-29 | End: 2018-06-13 | Stop reason: CLARIF

## 2018-05-29 NOTE — PROGRESS NOTES
Subjective:       Patient ID: Sj Gonzalez is a 62 y.o. male.    Chief Complaint: Erectile Dysfunction    63 yo WM with DM, HTN, ED and Low T. Here for treatment.        Erectile Dysfunction   This is a chronic problem. The current episode started more than 1 year ago. The problem has been gradually worsening since onset. The nature of his difficulty is achieving erection, maintaining erection and penetration. He reports no anxiety, decreased libido or performance anxiety. He reports his erection duration to be less than 1 minute. Irritative symptoms include frequency (x4-5) and nocturia (x 1). Irritative symptoms do not include urgency. Obstructive symptoms do not include dribbling, incomplete emptying, an intermittent stream, a slower stream, straining or a weak stream. Pertinent negatives include no chills, dysuria, genital pain, hematuria, hesitancy or inability to urinate. Nothing aggravates the symptoms. Past treatments include sildenafil, tadalafil and vardenafil. The treatment provided no relief. He has been using treatment for 2 or more years. He has had no adverse reactions caused by medications. Risk factors include hypertension and diabetes mellitus.     Review of Systems   Constitutional: Negative for activity change, appetite change, chills, diaphoresis, fatigue, fever and unexpected weight change.   HENT: Negative for congestion, hearing loss, sinus pressure and trouble swallowing.    Eyes: Negative for photophobia, pain, discharge and visual disturbance.   Respiratory: Negative for apnea, cough and shortness of breath.    Cardiovascular: Negative for chest pain, palpitations and leg swelling.   Gastrointestinal: Negative for abdominal distention, abdominal pain, anal bleeding, blood in stool, constipation, diarrhea, nausea, rectal pain and vomiting.   Endocrine: Negative for cold intolerance, heat intolerance, polydipsia, polyphagia and polyuria.   Genitourinary: Positive for frequency (x4-5) and  nocturia (x 1). Negative for decreased libido, decreased urine volume, difficulty urinating, discharge, dysuria, enuresis, flank pain, genital sores, hematuria, hesitancy, incomplete emptying, penile pain, penile swelling, scrotal swelling, testicular pain and urgency.   Musculoskeletal: Negative for arthralgias, back pain and myalgias.   Skin: Negative for color change, pallor, rash and wound.   Allergic/Immunologic: Negative for environmental allergies, food allergies and immunocompromised state.   Neurological: Negative for dizziness, seizures, weakness and headaches.   Hematological: Negative for adenopathy. Does not bruise/bleed easily.   Psychiatric/Behavioral: Negative.  The patient is not nervous/anxious.        Objective:      Physical Exam   Nursing note and vitals reviewed.  Constitutional: He is oriented to person, place, and time. He appears well-developed and well-nourished.   HENT:   Head: Normocephalic.   Nose: Nose normal.   Mouth/Throat: Oropharynx is clear and moist.   Eyes: Conjunctivae and EOM are normal. Pupils are equal, round, and reactive to light.   Neck: Normal range of motion. Neck supple.   Cardiovascular: Normal rate, regular rhythm, normal heart sounds and intact distal pulses.    Pulmonary/Chest: Effort normal and breath sounds normal.   Abdominal: Soft. Bowel sounds are normal.   Genitourinary: Rectum normal, prostate normal, testes normal and penis normal. Cremasteric reflex is present. Circumcised.   Musculoskeletal: Normal range of motion.   Neurological: He is alert and oriented to person, place, and time. He has normal reflexes.   Skin: Skin is warm and dry.     Psychiatric: He has a normal mood and affect. His behavior is normal. Judgment and thought content normal.       Assessment:       1. Erectile dysfunction, unspecified erectile dysfunction type    2. Low testosterone    3. Nocturia        Plan:       Patient Instructions   Tri Mix Trial  Testosterone  Level  Testosterone  Replacement Therapy  F/U 1 month with T Levels and 1 - 2 weeks for PEP training

## 2018-05-29 NOTE — PATIENT INSTRUCTIONS
Tri Mix Trial  Testosterone  Level  Testosterone Replacement Therapy  F/U 1 month with T Levels and 1 - 2 weeks for PEP training

## 2018-05-30 ENCOUNTER — TELEPHONE (OUTPATIENT)
Dept: ENDOSCOPY | Facility: HOSPITAL | Age: 63
End: 2018-05-30

## 2018-05-30 NOTE — TELEPHONE ENCOUNTER
Attempted to contact patient to schedule colonoscopy, spoke with wife, as patient is unavailable. Left message with direct number for patient to return call to schedule procedure.

## 2018-06-07 ENCOUNTER — TELEPHONE (OUTPATIENT)
Dept: INTERNAL MEDICINE | Facility: CLINIC | Age: 63
End: 2018-06-07

## 2018-06-07 ENCOUNTER — PATIENT MESSAGE (OUTPATIENT)
Dept: UROLOGY | Facility: CLINIC | Age: 63
End: 2018-06-07

## 2018-06-07 NOTE — TELEPHONE ENCOUNTER
Patient reviewed his test results and would like Dr. Silva to reorder what ever test that need to be repeated before his follow up appointment. Vf/ma

## 2018-06-13 ENCOUNTER — PROCEDURE VISIT (OUTPATIENT)
Dept: UROLOGY | Facility: CLINIC | Age: 63
End: 2018-06-13
Payer: COMMERCIAL

## 2018-06-13 VITALS
SYSTOLIC BLOOD PRESSURE: 109 MMHG | OXYGEN SATURATION: 98 % | RESPIRATION RATE: 17 BRPM | HEIGHT: 72 IN | HEART RATE: 78 BPM | WEIGHT: 231 LBS | BODY MASS INDEX: 31.29 KG/M2 | DIASTOLIC BLOOD PRESSURE: 64 MMHG

## 2018-06-13 DIAGNOSIS — N52.9 ERECTILE DYSFUNCTION, UNSPECIFIED ERECTILE DYSFUNCTION TYPE: ICD-10-CM

## 2018-06-13 DIAGNOSIS — R79.89 LOW TESTOSTERONE: Primary | ICD-10-CM

## 2018-06-13 PROCEDURE — 99214 OFFICE O/P EST MOD 30 MIN: CPT | Mod: S$GLB,,, | Performed by: UROLOGY

## 2018-06-13 RX ORDER — TESTOSTERONE CYPIONATE 200 MG/ML
200 INJECTION, SOLUTION INTRAMUSCULAR
Qty: 10 ML | Refills: 1 | Status: SHIPPED | OUTPATIENT
Start: 2018-06-13 | End: 2019-10-29 | Stop reason: SDUPTHER

## 2018-06-13 NOTE — PROCEDURES
Procedures Office visit  Patient ID: Sj Gonzalez is a 62 y.o. male.     Chief Complaint: Erectile Dysfunction     61 yo WM with DM, HTN, ED and Low T. Here for treatment. Insurance doesn't cover Gels Pt already using PEP. Wants alternative PEP        Erectile Dysfunction   This is a chronic problem. The current episode started more than 1 year ago. The problem has been gradually worsening since onset. The nature of his difficulty is achieving erection, maintaining erection and penetration. He reports no anxiety, decreased libido or performance anxiety. He reports his erection duration to be less than 1 minute. Irritative symptoms include frequency (x4-5) and nocturia (x 1). Irritative symptoms do not include urgency. Obstructive symptoms do not include dribbling, incomplete emptying, an intermittent stream, a slower stream, straining or a weak stream. Pertinent negatives include no chills, dysuria, genital pain, hematuria, hesitancy or inability to urinate. Nothing aggravates the symptoms. Past treatments include sildenafil, tadalafil and vardenafil. The treatment provided no relief. He has been using treatment for 2 or more years. He has had no adverse reactions caused by medications. Risk factors include hypertension and diabetes mellitus.      Review of Systems   Constitutional: Negative for activity change, appetite change, chills, diaphoresis, fatigue, fever and unexpected weight change.   HENT: Negative for congestion, hearing loss, sinus pressure and trouble swallowing.    Eyes: Negative for photophobia, pain, discharge and visual disturbance.   Respiratory: Negative for apnea, cough and shortness of breath.    Cardiovascular: Negative for chest pain, palpitations and leg swelling.   Gastrointestinal: Negative for abdominal distention, abdominal pain, anal bleeding, blood in stool, constipation, diarrhea, nausea, rectal pain and vomiting.   Endocrine: Negative for cold intolerance, heat intolerance,  polydipsia, polyphagia and polyuria.   Genitourinary: Positive for frequency (x4-5) and nocturia (x 1). Negative for decreased libido, decreased urine volume, difficulty urinating, discharge, dysuria, enuresis, flank pain, genital sores, hematuria, hesitancy, incomplete emptying, penile pain, penile swelling, scrotal swelling, testicular pain and urgency.   Musculoskeletal: Negative for arthralgias, back pain and myalgias.   Skin: Negative for color change, pallor, rash and wound.   Allergic/Immunologic: Negative for environmental allergies, food allergies and immunocompromised state.   Neurological: Negative for dizziness, seizures, weakness and headaches.   Hematological: Negative for adenopathy. Does not bruise/bleed easily.   Psychiatric/Behavioral: Negative.  The patient is not nervous/anxious.        Objective:   Physical Exam   Nursing note and vitals reviewed.  Constitutional: He is oriented to person, place, and time. He appears well-developed and well-nourished.   HENT:   Head: Normocephalic.   Nose: Nose normal.   Mouth/Throat: Oropharynx is clear and moist.   Eyes: Conjunctivae and EOM are normal. Pupils are equal, round, and reactive to light.   Neck: Normal range of motion. Neck supple.   Cardiovascular: Normal rate, regular rhythm, normal heart sounds and intact distal pulses.    Pulmonary/Chest: Effort normal and breath sounds normal.   Abdominal: Soft. Bowel sounds are normal.   Genitourinary: Rectum normal, prostate normal, testes normal and penis normal. Cremasteric reflex is present. Circumcised.   Musculoskeletal: Normal range of motion.   Neurological: He is alert and oriented to person, place, and time. He has normal reflexes.   Skin: Skin is warm and dry.     Psychiatric: He has a normal mood and affect. His behavior is normal. Judgment and thought content normal.       Assessment:       1. Erectile dysfunction, unspecified erectile dysfunction type    2. Low testosterone    3. Nocturia         Plan:       Patient Instructions   Tri Mix Trial  Testosterone  Level after 8 weeks of TRT  Testosterone Replacement Therapy with T Cypionate  F/U 6 month with T Levels and PSA levels

## 2018-06-14 ENCOUNTER — TELEPHONE (OUTPATIENT)
Dept: GASTROENTEROLOGY | Facility: CLINIC | Age: 63
End: 2018-06-14

## 2018-06-14 NOTE — TELEPHONE ENCOUNTER
Spoke with patient in regards to scheduling a Screening Colonoscopy, pt stated he will give the office a call back a later date because he needs to check his work schedule.

## 2018-07-11 ENCOUNTER — TELEPHONE (OUTPATIENT)
Dept: GASTROENTEROLOGY | Facility: CLINIC | Age: 63
End: 2018-07-11

## 2018-07-18 ENCOUNTER — TELEPHONE (OUTPATIENT)
Dept: GASTROENTEROLOGY | Facility: CLINIC | Age: 63
End: 2018-07-18

## 2018-08-13 ENCOUNTER — OFFICE VISIT (OUTPATIENT)
Dept: URGENT CARE | Facility: CLINIC | Age: 63
End: 2018-08-13
Payer: COMMERCIAL

## 2018-08-13 VITALS
DIASTOLIC BLOOD PRESSURE: 84 MMHG | SYSTOLIC BLOOD PRESSURE: 127 MMHG | WEIGHT: 230 LBS | HEIGHT: 72 IN | OXYGEN SATURATION: 96 % | TEMPERATURE: 99 F | BODY MASS INDEX: 31.15 KG/M2 | RESPIRATION RATE: 16 BRPM | HEART RATE: 81 BPM

## 2018-08-13 DIAGNOSIS — L03.211 FACIAL CELLULITIS: Primary | ICD-10-CM

## 2018-08-13 DIAGNOSIS — L08.9 PUSTULE: ICD-10-CM

## 2018-08-13 PROCEDURE — 99214 OFFICE O/P EST MOD 30 MIN: CPT | Mod: S$GLB,,, | Performed by: EMERGENCY MEDICINE

## 2018-08-13 PROCEDURE — 3079F DIAST BP 80-89 MM HG: CPT | Mod: CPTII,S$GLB,, | Performed by: EMERGENCY MEDICINE

## 2018-08-13 PROCEDURE — 3074F SYST BP LT 130 MM HG: CPT | Mod: CPTII,S$GLB,, | Performed by: EMERGENCY MEDICINE

## 2018-08-13 RX ORDER — AMLODIPINE BESYLATE 10 MG/1
10 TABLET ORAL DAILY
Qty: 90 TABLET | Refills: 3 | Status: SHIPPED | OUTPATIENT
Start: 2018-08-13 | End: 2019-06-28 | Stop reason: SDUPTHER

## 2018-08-13 RX ORDER — DOXYCYCLINE 100 MG/1
100 CAPSULE ORAL EVERY 12 HOURS
Qty: 20 CAPSULE | Refills: 0 | Status: SHIPPED | OUTPATIENT
Start: 2018-08-13 | End: 2018-08-23

## 2018-08-13 RX ORDER — CLINDAMYCIN PHOSPHATE 150 MG/ML
300 INJECTION, SOLUTION INTRAVENOUS
Status: DISCONTINUED | OUTPATIENT
Start: 2018-08-13 | End: 2018-08-13 | Stop reason: HOSPADM

## 2018-08-13 RX ORDER — LISINOPRIL AND HYDROCHLOROTHIAZIDE 12.5; 2 MG/1; MG/1
1 TABLET ORAL DAILY
Qty: 90 TABLET | Refills: 3 | Status: SHIPPED | OUTPATIENT
Start: 2018-08-13 | End: 2019-06-28 | Stop reason: SDUPTHER

## 2018-08-13 RX ADMIN — CLINDAMYCIN PHOSPHATE 300 MG: 150 INJECTION, SOLUTION INTRAVENOUS at 05:08

## 2018-08-13 NOTE — PATIENT INSTRUCTIONS
Malik Lema MD  Go to the Emergency Department for any problems  Call your PCP for follow up next available.    Facial Cellulitis  Cellulitis is an infection of the deep layers of skin. A break in the skin, such as a cut or scratch, can let bacteria under the skin. It may also occur from an infected oil gland (pimple) or hair follicle. If the bacteria get to deep layers of the skin, it can be serious. If not treated, cellulitis can get into the bloodstream and lymph nodes. The infection can then spread throughout the body. This causes serious illness.  Cellulitis causes the affected skin to become red, swollen, warm, and sore. The reddened areas have a visible border. You may have a fever, chills, and pain.  Cellulitis is treated with antibiotics taken for 7 to 10 days. Symptoms should get better 1 to 2 days after treatment is started. Make sure to take all the antibiotics for the full number of days until they are gone. Keep taking the medication even if your symptoms go away.  Home care  Follow these tips:  · Take all of the antibiotic medicine exactly as directed until it is gone. Dont miss any doses, especially during the first 7 days. Dont stop taking it when your symptoms get better.  · Use a cool compress (face cloth soaked in cool water) on your face to help reduce swelling and pain.  · You may use acetaminophen or ibuprofen to reduce pain. Dont use these if you have chronic liver or kidney disease, or ever had a stomach ulcer or gastrointestinal bleeding. Talk with your healthcare provider first.  Follow-up care  Follow up with your healthcare provider, or as advised. If your infection does not go away on the first antibiotic, your healthcare provider will prescribe a different one.  When to seek medical advice  Call your healthcare provider right away if any of these occur:  · Fever higher of 100.4º F (38.0º C) or higher after 2 days on antibiotics  · Red areas that spread  · Swelling or pain that  gets worse  · Fluid leaking from the skin (pus)  · An eyelid that swells shut or leaks fluid (pus)  · Headache or neck pain that gets worse  · Unusual drowsiness or confusion  · Convulsions (seizure)  · Change in eyesight     Date Last Reviewed: 9/1/2016  © 1745-0091 [employer]. [address] [place] [address]. All rights reserved. This information is not intended as a substitute for professional medical care. Always follow your healthcare professional's instructions.        Abscess (Incision & Drainage)  An abscess is sometimes called a boil. It happens when bacteria get trapped under the skin and start to grow. Pus forms inside the abscess as the body responds to the bacteria. An abscess can happen with an insect bite, ingrown hair, blocked oil gland, pimple, cyst, or puncture wound.  Your healthcare provider has drained the pus from your abscess. If the abscess pocket was large, your healthcare provider may have put in gauze packing. Your provider will need to remove it on your next visit. He or she may also replace it at that time. You may not need antibiotics to treat a simple abscess, unless the infection is spreading into the skin around the wound (cellulitis).  The wound will take about 1 to 2 weeks to heal, depending on the size of the abscess. Healthy tissue will grow from the bottom and sides of the opening until it seals over.  Home care  These tips can help your wound heal:  · The wound may drain for the first 2 days. Cover the wound with a clean dry dressing. Change the dressing if it becomes soaked with blood or pus.  · If a gauze packing was placed inside the abscess pocket, you may be told to remove it yourself. You may do this in the shower. Once the packing is removed, you should wash the area in the shower, or clean the area as directed by your provider. Continue to do this until the skin opening has closed. Make sure you wash your hands after changing the packing or  cleaning the wound.  · If you were prescribed antibiotics, take them as directed until they are all gone.  · You may use acetaminophen or ibuprofen to control pain, unless another pain medicine was prescribed. If you have liver disease or ever had a stomach ulcer, talk with your doctor before using these medicines.  Follow-up care  Follow up with your healthcare provider, or as advised. If a gauze packing was put in your wound, it should be removed in 1 to 2 days. Check your wound every day for any signs that the infection is getting worse. The signs are listed below.  When to seek medical advice  Call your healthcare provider right away if any of these occur:  · Increasing redness or swelling  · Red streaks in the skin leading away from the wound  · Increasing local pain or swelling  · Continued pus draining from the wound 2 days after treatment  · Fever of 100.4ºF (38ºC) or higher, or as directed by your healthcare provider  · Boil returns when you are at home  Date Last Reviewed: 9/1/2016  © 5186-3129 [employer]. [address] [place] [address], [place] [address]. All rights reserved. This information is not intended as a substitute for professional medical care. Always follow your healthcare professional's instructions.      Abscess sheet FYI

## 2018-08-13 NOTE — PROGRESS NOTES
Subjective:       Patient ID: Sj Gonzalez is a 63 y.o. male.    Vitals:  height is 6' (1.829 m) and weight is 104.3 kg (230 lb). His oral temperature is 98.6 °F (37 °C). His blood pressure is 127/84 and his pulse is 81. His respiration is 16 and oxygen saturation is 96%.     Chief Complaint: Facial Swelling    Patient states he noticed a red, swollen, bump on right side of face/head around side burns of hair yesterday. Today he states it is more swollen, red and painful.  Has bactoban at home.      Facial Pain   This is a new problem. The current episode started yesterday. The problem occurs daily. The problem has been gradually worsening. Associated symptoms include chills and a fever. Pertinent negatives include no abdominal pain, chest pain, headaches, nausea, rash, sore throat or vomiting. Nothing aggravates the symptoms. He has tried NSAIDs and heat for the symptoms. The treatment provided no relief.     Review of Systems   Constitution: Positive for chills and fever.   HENT: Negative for sore throat.    Eyes: Negative for blurred vision.   Cardiovascular: Negative for chest pain.   Respiratory: Negative for shortness of breath.    Skin: Positive for color change. Negative for rash.   Musculoskeletal: Negative for back pain and joint pain.   Gastrointestinal: Negative for abdominal pain, diarrhea, nausea and vomiting.   Neurological: Negative for headaches.   Psychiatric/Behavioral: The patient is not nervous/anxious.        Objective:      Physical Exam   Constitutional: He is oriented to person, place, and time. He appears well-developed and well-nourished.   HENT:   Head: Normocephalic.       Right facial area at side burn margin with small pustule and surrounding erythema/edema, non fluctuant, tender.   Eyes: EOM are normal. Pupils are equal, round, and reactive to light.   Neck: Normal range of motion. Neck supple.   Cardiovascular: Normal rate, regular rhythm and normal heart sounds.   Pulmonary/Chest:  Breath sounds normal.   Musculoskeletal: Normal range of motion.   Lymphadenopathy:     He has no cervical adenopathy.   Neurological: He is alert and oriented to person, place, and time.   Psychiatric: He has a normal mood and affect. His behavior is normal.       Assessment:       1. Facial cellulitis    2. Pustule        Plan:         Facial cellulitis  -     Incision & Drainage  -     clindamycin injection 300 mg; Inject 2 mLs (300 mg total) into the muscle one time.  -     doxycycline (VIBRAMYCIN) 100 MG Cap; Take 1 capsule (100 mg total) by mouth every 12 (twelve) hours. Generic or equivalent alternative OK for 10 days  Dispense: 20 capsule; Refill: 0    Pustule  -     clindamycin injection 300 mg; Inject 2 mLs (300 mg total) into the muscle one time.  -     doxycycline (VIBRAMYCIN) 100 MG Cap; Take 1 capsule (100 mg total) by mouth every 12 (twelve) hours. Generic or equivalent alternative OK for 10 days  Dispense: 20 capsule; Refill: 0        Malik Lema MD  Go to the Emergency Department for any problems  Call your PCP for follow up next available.  Bactoban oint. Applied to area by MA.

## 2018-08-13 NOTE — TELEPHONE ENCOUNTER
----- Message from Yasmine Whitley sent at 8/13/2018 11:19 AM CDT -----  Contact: [phone]/ pts wife  Patient would like refill of the following medications sent to lisinopril-hydrochlorothiazide (PRINZIDE,ZESTORETIC) 20-12.5 mg per tablet. Pts wife requested a call once fills are called in. Please advise.    1. lisinopril-hydrochlorothiazide (PRINZIDE,ZESTORETIC) 20-12.5 mg per tablet  2. amLODIPine (NORVASC) 10 MG tablet

## 2018-08-13 NOTE — PROCEDURES
"Incision & Drainage  Date/Time: 8/13/2018 4:59 PM  Performed by: Malik Lema MD  Authorized by: Malik Lema MD     Time out: Immediately prior to procedure a "time out" was called to verify the correct patient, procedure, equipment, support staff and site/side marked as required.    Consent Done?:  Yes (Verbal)    Type:  Abscess  Body area:  Head/neck  Anesthetic total (ml):  0  Incision type:  Single straight  Complexity:  Simple  Drainage:  Pus  Drainage amount:  Scant  Patient tolerance:  Patient tolerated the procedure well with no immediate complications    Roof of pustule removed with sterile # 11 scapel with almost no pyogenic material removed, less than 1 cc EBL, well tolerated, no complications, area cleansed with alcohol prep prior to procedure.      "

## 2018-08-14 ENCOUNTER — OFFICE VISIT (OUTPATIENT)
Dept: FAMILY MEDICINE | Facility: CLINIC | Age: 63
End: 2018-08-14
Payer: COMMERCIAL

## 2018-08-14 ENCOUNTER — TELEPHONE (OUTPATIENT)
Dept: INTERNAL MEDICINE | Facility: CLINIC | Age: 63
End: 2018-08-14

## 2018-08-14 VITALS
WEIGHT: 226 LBS | DIASTOLIC BLOOD PRESSURE: 68 MMHG | HEART RATE: 93 BPM | HEIGHT: 72 IN | SYSTOLIC BLOOD PRESSURE: 120 MMHG | OXYGEN SATURATION: 98 % | BODY MASS INDEX: 30.61 KG/M2 | TEMPERATURE: 99 F

## 2018-08-14 DIAGNOSIS — L02.811 CELLULITIS AND ABSCESS OF HEAD: ICD-10-CM

## 2018-08-14 DIAGNOSIS — L03.811 CELLULITIS AND ABSCESS OF HEAD: ICD-10-CM

## 2018-08-14 PROBLEM — E11.9 DM2 (DIABETES MELLITUS, TYPE 2): Status: ACTIVE | Noted: 2018-08-14

## 2018-08-14 PROBLEM — L02.01 FACIAL ABSCESS: Status: ACTIVE | Noted: 2018-08-14

## 2018-08-14 PROCEDURE — 3074F SYST BP LT 130 MM HG: CPT | Mod: CPTII,S$GLB,, | Performed by: FAMILY MEDICINE

## 2018-08-14 PROCEDURE — 99214 OFFICE O/P EST MOD 30 MIN: CPT | Mod: S$GLB,,, | Performed by: FAMILY MEDICINE

## 2018-08-14 PROCEDURE — 99999 PR PBB SHADOW E&M-EST. PATIENT-LVL III: CPT | Mod: PBBFAC,,, | Performed by: FAMILY MEDICINE

## 2018-08-14 PROCEDURE — 3078F DIAST BP <80 MM HG: CPT | Mod: CPTII,S$GLB,, | Performed by: FAMILY MEDICINE

## 2018-08-14 PROCEDURE — 3008F BODY MASS INDEX DOCD: CPT | Mod: CPTII,S$GLB,, | Performed by: FAMILY MEDICINE

## 2018-08-14 NOTE — ASSESSMENT & PLAN NOTE
- worsening despite IM and oral antibiotic with extension to right eye  - discussed with pt that may need I&D and IV abx and CT imaging   - discussed with ER Dr. Dr. Corrales  - pt transferred to the ER

## 2018-08-14 NOTE — TELEPHONE ENCOUNTER
----- Message from Mary Skelton sent at 8/14/2018 10:15 AM CDT -----  Contact: wife Kailey [phone]  Patient calling to inform you that he will be running late for his appointment. Please advise.

## 2018-08-14 NOTE — TELEPHONE ENCOUNTER
----- Message from Sharif Barrera sent at 8/14/2018  8:30 AM CDT -----  Contact: [phone]  Patient requested to speak with the nurse because his eye is swollen shut and it's painful. Please call and advise.

## 2018-08-14 NOTE — PROGRESS NOTES
"FAMILY MEDICINE    Patient Active Problem List   Diagnosis    Hypertension    ED (erectile dysfunction)    DDD (degenerative disc disease), lumbar    Non morbid obesity due to excess calories    Low testosterone    Nocturia    Facial cellulitis    Cellulitis and abscess of head       CC:   Chief Complaint   Patient presents with    Cyst       SUBJECTIVE:  Sj Gonzalez   is a 63 y.o. male   - with HTN present with left facial swelling that started 8/12/18. Wife reports that it looked like an infected hair follicle on his right temporal area that continued to enlarge. +pain that is throbbing. Reports that was seen at Urgent care yesterday and reports that physician unroofed the lesion and +pus drained however lesion was not opened up. Was given Clindamycin 300 mg IM x1 and discharged with Doxycycline 100 mg BID which he has taken 2 doses since Urgent care visit. Reports that attempted to "squeeze" the lesion when they noticed more pus draining but pt reports that it was too painful. +fevers last night and chills. Poor appetites and poor fluid intake. Feeling unwell. Swelling now extending to left eye with +periorbital swelling, redness and pain. No vision changes. No drainage from his eye. No photophobia.        ROS: Review of Systems   Constitutional: Positive for activity change, appetite change, chills, fatigue and fever.   HENT: Positive for facial swelling. Negative for ear discharge, ear pain, hearing loss, mouth sores, tinnitus and trouble swallowing.    Eyes: Positive for pain. Negative for photophobia, discharge and visual disturbance.   Respiratory: Negative for cough, chest tightness and shortness of breath.    Cardiovascular: Negative for chest pain, palpitations and leg swelling.   Gastrointestinal: Negative for abdominal pain, diarrhea, nausea and vomiting.   Endocrine: Negative for polyuria.   Genitourinary: Negative for decreased urine volume and difficulty urinating.   Musculoskeletal: " Positive for myalgias. Negative for arthralgias, neck pain and neck stiffness.   Neurological: Positive for headaches. Negative for dizziness, tremors, weakness and light-headedness.   Psychiatric/Behavioral: Positive for sleep disturbance.       Past Medical History:   Diagnosis Date    Heart murmur     MVP    Hypertension     Urinary tract infection        Past Surgical History:   Procedure Laterality Date    SPINE SURGERY      Lumbar       ALLERGIES: Review of patient's allergies indicates:  No Known Allergies    MEDS: No current facility-administered medications for this visit.     Current Outpatient Medications:     amLODIPine (NORVASC) 10 MG tablet, Take 1 tablet (10 mg total) by mouth once daily., Disp: 90 tablet, Rfl: 3    doxycycline (VIBRAMYCIN) 100 MG Cap, Take 1 capsule (100 mg total) by mouth every 12 (twelve) hours. Generic or equivalent alternative OK for 10 days, Disp: 20 capsule, Rfl: 0    lisinopril-hydrochlorothiazide (PRINZIDE,ZESTORETIC) 20-12.5 mg per tablet, Take 1 tablet by mouth once daily., Disp: 90 tablet, Rfl: 3    testosterone cypionate (DEPOTESTOTERONE CYPIONATE) 200 mg/mL injection, Inject 1 mL (200 mg total) into the muscle every 14 (fourteen) days., Disp: 10 mL, Rfl: 1    metFORMIN (GLUCOPHAGE) 500 MG tablet, Take 1 tablet (500 mg total) by mouth 2 (two) times daily with meals., Disp: 180 tablet, Rfl: 0    pep injection, Inject .25 ml as directed   For compounding pharmacy use:  Add PAPAVERINE 30 mcg Add PHENTOLAMINE 10 mg Add ALPROSTADIL 100 mcg, Disp: 2 vial, Rfl: 11    Facility-Administered Medications Ordered in Other Visits:     lidocaine-EPINEPHrine 1%-1:100,000 injection 1 mL, 1 mL, Other, ED 1 Time, Costa Corrales MD    OBJECTIVE:   Vitals:    08/14/18 1101   BP: 120/68   BP Location: Left arm   Patient Position: Sitting   BP Method: Large (Manual)   Pulse: 93   Temp: 99.3 °F (37.4 °C)   TempSrc: Oral   SpO2: 98%   Weight: 102.5 kg (226 lb)   Height: 6' (1.829 m)        Physical Exam   Constitutional: He is oriented to person, place, and time. He appears distressed (but ill appearing).   HENT:   Head: Normocephalic. Head is with right periorbital erythema.       Eyes: EOM are normal. Pupils are equal, round, and reactive to light. Right eye exhibits chemosis. Right eye exhibits no discharge and no exudate.   Neck: Neck supple.   Cardiovascular: Normal rate, regular rhythm, normal heart sounds and intact distal pulses.   Pulmonary/Chest: Effort normal and breath sounds normal.   Musculoskeletal: He exhibits no edema.   Lymphadenopathy:     He has no cervical adenopathy.   Neurological: He is oriented to person, place, and time.   Skin: Skin is warm.       Reviewed Urgent Care note    ASSESSMENT/PLAN:  Problem List Items Addressed This Visit     Cellulitis and abscess of head    Current Assessment & Plan     - worsening despite IM and oral antibiotic with extension to right eye  - discussed with pt that may need I&D and IV abx and CT imaging   - discussed with ER Dr. Dr. Corrales  - pt transferred to the ER               Follow-up 1 week s/p discharge    Dr. Idalia Alexandra D.O.   [facility]

## 2018-08-16 ENCOUNTER — TELEPHONE (OUTPATIENT)
Dept: URGENT CARE | Facility: CLINIC | Age: 63
End: 2018-08-16

## 2018-08-19 PROBLEM — E11.65 TYPE 2 DIABETES MELLITUS WITH HYPERGLYCEMIA: Status: ACTIVE | Noted: 2018-08-14

## 2018-08-20 ENCOUNTER — TELEPHONE (OUTPATIENT)
Dept: INTERNAL MEDICINE | Facility: CLINIC | Age: 63
End: 2018-08-20

## 2018-08-20 NOTE — TELEPHONE ENCOUNTER
----- Message from Jessica Montgomery sent at 8/20/2018  8:06 AM CDT -----  Contact: Wife [phone] or [phone]  Patient would like to speak with you about being seen today for a hospital follow up. Please advise.

## 2018-08-20 NOTE — TELEPHONE ENCOUNTER
Dr. Silva does not have anything today so appt with  was offered. Declined offer. Appt with Dr. Silva offered for tomorrow at 2:40 but was declined due to patient will be out of town. Patient prefers to keep appointment he has on 8/29 for now.

## 2018-11-21 RX ORDER — GLIPIZIDE 5 MG/1
5 TABLET ORAL
Qty: 90 TABLET | Refills: 0 | Status: SHIPPED | OUTPATIENT
Start: 2018-11-21 | End: 2019-02-14 | Stop reason: SDUPTHER

## 2018-11-21 NOTE — TELEPHONE ENCOUNTER
----- Message from Yumi Prince sent at 11/21/2018  9:24 AM CST -----  Contact: Kailey (wife)/ [phone]  Wife called in requesting refill on patients medication.    Please call. Patient has been out for 2 days.    glipiZIDE (GLUCOTROL) 5 MG tablet    [facility] [address] [place] [id number] [facility] [address]

## 2019-02-06 ENCOUNTER — TELEPHONE (OUTPATIENT)
Dept: GASTROENTEROLOGY | Facility: CLINIC | Age: 64
End: 2019-02-06

## 2019-02-06 NOTE — TELEPHONE ENCOUNTER
Spoke with patient about scheduling a Colonoscopy. Patient stated that he do not have time to schedule procedure. Patient stated that he will give office a call back at a later date.

## 2019-02-14 RX ORDER — GLIPIZIDE 5 MG/1
TABLET ORAL
Qty: 30 TABLET | Refills: 0 | Status: SHIPPED | OUTPATIENT
Start: 2019-02-14 | End: 2019-04-16

## 2019-02-14 NOTE — TELEPHONE ENCOUNTER
----- Message from Nancy Rodríguez sent at 2/14/2019  9:25 AM CST -----  Contact: spouse, [phone]  Requests Glipizide prescription refill sent to [facility] pharmacy in [place] today. States patient is completely out. Please advise.

## 2019-02-22 DIAGNOSIS — E11.9 TYPE 2 DIABETES MELLITUS WITHOUT COMPLICATION: ICD-10-CM

## 2019-02-25 ENCOUNTER — TELEPHONE (OUTPATIENT)
Dept: INTERNAL MEDICINE | Facility: CLINIC | Age: 64
End: 2019-02-25

## 2019-02-25 NOTE — TELEPHONE ENCOUNTER
----- Message from Cass Miranda sent at 2/25/2019 11:50 AM CST -----  Contact: [phone]/wife/Kailey  Patient would like to be seen sooner than the next available appointment.  Patient is having chest pains and declines going to the ER.     Patient's wife is requesting a referral to a cardiologist. Thanks

## 2019-02-25 NOTE — TELEPHONE ENCOUNTER
Spoke with wife and she states that patient is refusing to go to the ER. Patient denies any chest pressure, denies pain in any other areas. Offered Dr. Ma on Wednesday at 8:00am and patient is unable to make that time. Offered PCP appt, Patient scheduled with Dr. Alexandra for tomorrow.

## 2019-03-01 DIAGNOSIS — E11.9 TYPE 2 DIABETES MELLITUS WITHOUT COMPLICATION, UNSPECIFIED WHETHER LONG TERM INSULIN USE: ICD-10-CM

## 2019-03-11 ENCOUNTER — TELEPHONE (OUTPATIENT)
Dept: FAMILY MEDICINE | Facility: CLINIC | Age: 64
End: 2019-03-11

## 2019-03-11 NOTE — TELEPHONE ENCOUNTER
----- Message from Lito Lyon sent at 3/11/2019  9:39 AM CDT -----  Contact: self [phone]  Patient is requesting orders for lab work. Please advise.

## 2019-03-21 RX ORDER — LISINOPRIL AND HYDROCHLOROTHIAZIDE 12.5; 2 MG/1; MG/1
1 TABLET ORAL DAILY
Qty: 90 TABLET | Refills: 3 | Status: CANCELLED | OUTPATIENT
Start: 2019-03-21

## 2019-03-21 RX ORDER — AMLODIPINE BESYLATE 10 MG/1
10 TABLET ORAL DAILY
Qty: 90 TABLET | Refills: 3 | Status: CANCELLED | OUTPATIENT
Start: 2019-03-21

## 2019-04-09 ENCOUNTER — PATIENT OUTREACH (OUTPATIENT)
Dept: ADMINISTRATIVE | Facility: HOSPITAL | Age: 64
End: 2019-04-09

## 2019-04-15 RX ORDER — GLIPIZIDE 5 MG/1
TABLET ORAL
Qty: 30 TABLET | Refills: 0 | OUTPATIENT
Start: 2019-04-15

## 2019-04-16 RX ORDER — GLIPIZIDE 5 MG/1
5 TABLET ORAL DAILY
Qty: 30 TABLET | Refills: 0 | Status: SHIPPED | OUTPATIENT
Start: 2019-04-16 | End: 2019-04-23 | Stop reason: DRUGHIGH

## 2019-04-16 NOTE — TELEPHONE ENCOUNTER
----- Message from Cass Miranda sent at 4/16/2019  3:48 PM CDT -----  Contact: [phone]/self  Patient is requesting to have a refill of   glipiZIDE (GLUCOTROL) 5 MG tablet. TAKE 1 TABLET BY MOUTH ONCE DAILY WITH BREAKFAST  sent to [id number] - [id number], [place] - [address] [address]   . Please advise.     Office rescheduled his appointment and he is totally out of his medication. Pharmacy sent in 2 requests for refill also.

## 2019-04-23 ENCOUNTER — OFFICE VISIT (OUTPATIENT)
Dept: INTERNAL MEDICINE | Facility: CLINIC | Age: 64
End: 2019-04-23
Payer: COMMERCIAL

## 2019-04-23 VITALS
OXYGEN SATURATION: 97 % | HEART RATE: 66 BPM | WEIGHT: 234.69 LBS | DIASTOLIC BLOOD PRESSURE: 78 MMHG | HEIGHT: 72 IN | BODY MASS INDEX: 31.79 KG/M2 | SYSTOLIC BLOOD PRESSURE: 138 MMHG | TEMPERATURE: 99 F | RESPIRATION RATE: 18 BRPM

## 2019-04-23 DIAGNOSIS — I10 ESSENTIAL HYPERTENSION: ICD-10-CM

## 2019-04-23 DIAGNOSIS — E66.09 NON MORBID OBESITY DUE TO EXCESS CALORIES: ICD-10-CM

## 2019-04-23 DIAGNOSIS — Z13.6 SCREENING FOR CARDIOVASCULAR CONDITION: ICD-10-CM

## 2019-04-23 DIAGNOSIS — R79.89 LOW TESTOSTERONE: ICD-10-CM

## 2019-04-23 DIAGNOSIS — Z91.199 NON COMPLIANCE WITH MEDICAL TREATMENT: ICD-10-CM

## 2019-04-23 DIAGNOSIS — Z12.11 SCREENING FOR COLON CANCER: ICD-10-CM

## 2019-04-23 DIAGNOSIS — Z79.4 TYPE 2 DIABETES MELLITUS WITH HYPERGLYCEMIA, WITH LONG-TERM CURRENT USE OF INSULIN: Primary | ICD-10-CM

## 2019-04-23 DIAGNOSIS — E11.65 TYPE 2 DIABETES MELLITUS WITH HYPERGLYCEMIA, WITH LONG-TERM CURRENT USE OF INSULIN: Primary | ICD-10-CM

## 2019-04-23 PROCEDURE — 99999 PR PBB SHADOW E&M-EST. PATIENT-LVL III: CPT | Mod: PBBFAC,,, | Performed by: INTERNAL MEDICINE

## 2019-04-23 PROCEDURE — 3008F PR BODY MASS INDEX (BMI) DOCUMENTED: ICD-10-PCS | Mod: CPTII,S$GLB,, | Performed by: INTERNAL MEDICINE

## 2019-04-23 PROCEDURE — 99214 PR OFFICE/OUTPT VISIT, EST, LEVL IV, 30-39 MIN: ICD-10-PCS | Mod: S$GLB,,, | Performed by: INTERNAL MEDICINE

## 2019-04-23 PROCEDURE — 3075F SYST BP GE 130 - 139MM HG: CPT | Mod: CPTII,S$GLB,, | Performed by: INTERNAL MEDICINE

## 2019-04-23 PROCEDURE — 99999 PR PBB SHADOW E&M-EST. PATIENT-LVL III: ICD-10-PCS | Mod: PBBFAC,,, | Performed by: INTERNAL MEDICINE

## 2019-04-23 PROCEDURE — 3045F PR MOST RECENT HEMOGLOBIN A1C LEVEL 7.0-9.0%: ICD-10-PCS | Mod: CPTII,S$GLB,, | Performed by: INTERNAL MEDICINE

## 2019-04-23 PROCEDURE — 99214 OFFICE O/P EST MOD 30 MIN: CPT | Mod: S$GLB,,, | Performed by: INTERNAL MEDICINE

## 2019-04-23 PROCEDURE — 3045F PR MOST RECENT HEMOGLOBIN A1C LEVEL 7.0-9.0%: CPT | Mod: CPTII,S$GLB,, | Performed by: INTERNAL MEDICINE

## 2019-04-23 PROCEDURE — 3075F PR MOST RECENT SYSTOLIC BLOOD PRESS GE 130-139MM HG: ICD-10-PCS | Mod: CPTII,S$GLB,, | Performed by: INTERNAL MEDICINE

## 2019-04-23 PROCEDURE — 3008F BODY MASS INDEX DOCD: CPT | Mod: CPTII,S$GLB,, | Performed by: INTERNAL MEDICINE

## 2019-04-23 PROCEDURE — 3078F PR MOST RECENT DIASTOLIC BLOOD PRESSURE < 80 MM HG: ICD-10-PCS | Mod: CPTII,S$GLB,, | Performed by: INTERNAL MEDICINE

## 2019-04-23 PROCEDURE — 3078F DIAST BP <80 MM HG: CPT | Mod: CPTII,S$GLB,, | Performed by: INTERNAL MEDICINE

## 2019-04-23 RX ORDER — PEN NEEDLE, DIABETIC 30 GX3/16"
NEEDLE, DISPOSABLE MISCELLANEOUS
Qty: 100 EACH | Refills: 5 | Status: SHIPPED | OUTPATIENT
Start: 2019-04-23 | End: 2019-10-29 | Stop reason: SDUPTHER

## 2019-04-23 RX ORDER — PEN NEEDLE, DIABETIC 30 GX3/16"
NEEDLE, DISPOSABLE MISCELLANEOUS
COMMUNITY
Start: 2019-02-14 | End: 2019-04-23

## 2019-04-23 RX ORDER — INSULIN GLARGINE 100 [IU]/ML
INJECTION, SOLUTION SUBCUTANEOUS
Qty: 15 ML | Refills: 5 | Status: SHIPPED | OUTPATIENT
Start: 2019-04-23 | End: 2019-10-08 | Stop reason: SDUPTHER

## 2019-04-23 RX ORDER — CLOTRIMAZOLE AND BETAMETHASONE DIPROPIONATE 10; .64 MG/G; MG/G
CREAM TOPICAL 2 TIMES DAILY
Qty: 45 G | Refills: 5 | Status: SHIPPED | OUTPATIENT
Start: 2019-04-23 | End: 2021-03-01

## 2019-04-23 RX ORDER — GLIPIZIDE 10 MG/1
10 TABLET, FILM COATED, EXTENDED RELEASE ORAL
Qty: 90 TABLET | Refills: 3 | Status: SHIPPED | OUTPATIENT
Start: 2019-04-23 | End: 2020-05-13 | Stop reason: SDUPTHER

## 2019-04-23 NOTE — PROGRESS NOTES
Subjective:      Patient ID: Sj Gonzalez is a 63 y.o. male.    Chief Complaint: Annual Exam (check up); Diabetic Eye Photo; Diabetic Foot Exam; Colonoscopy; and Blister (on top of both hands)    HPI: 63 y.o. White male, last seen by me 5/18/18.  He had been on  Levemir, but this is not on his plan.  His compliance may not be total.  Does not check his blood sugar routinely.    His last HGBA1C  8.8  But no recent general labs.  Metformin gave him diarrhea..    Has a circular,scaley blister on the dorsum of both hands,k that itch.      Review of Systems   Constitutional: Negative.  Negative for activity change and appetite change.   HENT: Negative.    Eyes: Negative for visual disturbance.   Respiratory: Negative.  Negative for cough, chest tightness, shortness of breath and wheezing.    Cardiovascular: Negative for chest pain and palpitations.   Gastrointestinal: Negative.  Negative for blood in stool, constipation, diarrhea, nausea and vomiting.   Endocrine: Negative for polydipsia, polyphagia and polyuria.   Genitourinary: Positive for frequency. Negative for dysuria and urgency.   Musculoskeletal: Negative.    Skin: Negative.    Neurological: Negative.    Psychiatric/Behavioral: Negative.        Objective:   /78 (BP Location: Left arm, Patient Position: Sitting, BP Method: Large (Manual))   Pulse 66   Temp 98.6 °F (37 °C) (Oral)   Resp 18   Ht 6' (1.829 m)   Wt 106.5 kg (234 lb 11.2 oz)   SpO2 97%   BMI 31.83 kg/m²     Physical Exam   Constitutional: He is oriented to person, place, and time. He appears well-developed and well-nourished.   HENT:   Head: Normocephalic and atraumatic.   Right Ear: External ear normal.   Left Ear: External ear normal.   Nose: Nose normal.   Mouth/Throat: Oropharynx is clear and moist.   Eyes: Pupils are equal, round, and reactive to light. Conjunctivae and EOM are normal.   Neck: Normal range of motion.   Cardiovascular: Normal rate, regular rhythm and normal heart  sounds.   Pulses:       Dorsalis pedis pulses are 1+ on the right side, and 1+ on the left side.        Posterior tibial pulses are 1+ on the right side, and 1+ on the left side.   Pulmonary/Chest: Effort normal and breath sounds normal.   Abdominal: Soft. Bowel sounds are normal.   Musculoskeletal: Normal range of motion.        Right foot: There is normal range of motion and no deformity.        Left foot: There is normal range of motion and no deformity.   Feet:   Right Foot:   Protective Sensation: 10 sites tested. 10 sites sensed.   Skin Integrity: Negative for skin breakdown.   Left Foot:   Protective Sensation: 10 sites tested. 10 sites sensed.   Skin Integrity: Negative for skin breakdown.   Neurological: He is alert and oriented to person, place, and time.   Skin: Skin is warm and dry.   2 dime sized circular erythematous areas on dorsum hands   Psychiatric: He has a normal mood and affect. His behavior is normal.   Nursing note and vitals reviewed.      Assessment:     1. Type 2 diabetes mellitus with hyperglycemia, with long-term current use of insulin    2. Low testosterone    3. Essential hypertension    4. Screening for colon cancer    5. Non morbid obesity due to excess calories    6. Non compliance with medical treatment    7. Screening for cardiovascular condition      Plan:     Type 2 diabetes mellitus with hyperglycemia, with long-term current use of insulin  -     insulin (BASAGLAR KWIKPEN U-100 INSULIN) glargine 100 units/mL (3mL) SubQ pen; Inject 18 units into the skin every evening  Dispense: 15 mL; Refill: 5  -     glipiZIDE (GLUCOTROL) 10 MG TR24; Take 1 tablet (10 mg total) by mouth daily with breakfast.  Dispense: 90 tablet; Refill: 3  -     blood sugar diagnostic Strp; 1 strip by Misc.(Non-Drug; Combo Route) route 2 (two) times daily before meals.  Dispense: 100 strip; Refill: 11  -     Microalbumin/creatinine urine ratio; Future; Expected date: 04/28/2019  -     Hemoglobin A1c; Future;  "Expected date: 04/28/2019    Low testosterone    Essential hypertension  -     Comprehensive metabolic panel; Future; Expected date: 04/28/2019  -     CBC auto differential; Future; Expected date: 04/28/2019    Screening for colon cancer  -     Fecal Immunochemical Test (iFOBT); Future; Expected date: 04/23/2019    Non morbid obesity due to excess calories    Non compliance with medical treatment    Screening for cardiovascular condition  -     Lipid panel; Future; Expected date: 04/28/2019    Other orders  -     clotrimazole-betamethasone 1-0.05% (LOTRISONE) cream; Apply topically 2 (two) times daily.  Dispense: 45 g; Refill: 5  -     pen needle, diabetic 32 gauge x 5/32" Ndle; Use nightly as directed with insulin pen.  Dispense: 100 each; Refill: 5        "

## 2019-04-28 PROBLEM — L02.01 FACIAL ABSCESS: Status: RESOLVED | Noted: 2018-08-14 | Resolved: 2019-04-28

## 2019-04-28 PROBLEM — L03.811 CELLULITIS AND ABSCESS OF HEAD: Status: RESOLVED | Noted: 2018-08-14 | Resolved: 2019-04-28

## 2019-04-28 PROBLEM — L02.811 CELLULITIS AND ABSCESS OF HEAD: Status: RESOLVED | Noted: 2018-08-14 | Resolved: 2019-04-28

## 2019-06-28 RX ORDER — AMLODIPINE BESYLATE 10 MG/1
TABLET ORAL
Qty: 90 TABLET | Refills: 0 | Status: SHIPPED | OUTPATIENT
Start: 2019-06-28 | End: 2019-10-02 | Stop reason: SDUPTHER

## 2019-06-28 RX ORDER — LISINOPRIL AND HYDROCHLOROTHIAZIDE 12.5; 2 MG/1; MG/1
1 TABLET ORAL DAILY
Qty: 90 TABLET | Refills: 0 | Status: SHIPPED | OUTPATIENT
Start: 2019-06-28 | End: 2019-10-02 | Stop reason: SDUPTHER

## 2019-06-28 NOTE — TELEPHONE ENCOUNTER
----- Message from Marlena ANDERSONJr Marleybari sent at 6/28/2019 11:02 AM CDT -----  Contact: [phone]  Stephanie wife  REFILLS:    Patient is requesting a medication refill.    RX name: amLODIPine (NORVASC) 10 MG tablet    Strength: 10 MG tablet    Directions: Take 1 tablet (10 mg total) by mouth once daily.    Pharmacy name: [address] [address] [address]    Phone number where pt can be reached: [phone]      Pt is leaving today on vacation by 1pm.

## 2019-07-24 ENCOUNTER — PATIENT OUTREACH (OUTPATIENT)
Dept: ADMINISTRATIVE | Facility: HOSPITAL | Age: 64
End: 2019-07-24

## 2019-10-02 DIAGNOSIS — I10 ESSENTIAL HYPERTENSION: Primary | ICD-10-CM

## 2019-10-02 RX ORDER — AMLODIPINE BESYLATE 10 MG/1
TABLET ORAL
Qty: 90 TABLET | Refills: 0 | Status: SHIPPED | OUTPATIENT
Start: 2019-10-02 | End: 2020-01-06

## 2019-10-02 RX ORDER — LISINOPRIL AND HYDROCHLOROTHIAZIDE 12.5; 2 MG/1; MG/1
1 TABLET ORAL DAILY
Qty: 90 TABLET | Refills: 0 | Status: SHIPPED | OUTPATIENT
Start: 2019-10-02 | End: 2019-10-29

## 2019-10-02 RX ORDER — AMLODIPINE BESYLATE 10 MG/1
10 TABLET ORAL DAILY
Qty: 90 TABLET | Refills: 0 | Status: SHIPPED | OUTPATIENT
Start: 2019-10-02 | End: 2019-10-02 | Stop reason: SDUPTHER

## 2019-10-02 RX ORDER — LISINOPRIL AND HYDROCHLOROTHIAZIDE 12.5; 2 MG/1; MG/1
1 TABLET ORAL DAILY
Qty: 90 TABLET | Refills: 0 | Status: SHIPPED | OUTPATIENT
Start: 2019-10-02 | End: 2019-10-02 | Stop reason: SDUPTHER

## 2019-10-02 NOTE — TELEPHONE ENCOUNTER
----- Message from Omaira Mcgrath sent at 10/2/2019 11:22 AM CDT -----  Contact: Wife  Wife is calling to speak with Staff regarding a prescription refill of Lisinopril/HCTZ 20.12.5 Mg Tablet, Quantity 90 & Amloditine, 10 Mg, Tablets, Quantity 90.  Wife says the pt is out and needs the refills.    She can be reached at [phone].    Thank you.

## 2019-10-08 ENCOUNTER — TELEPHONE (OUTPATIENT)
Dept: FAMILY MEDICINE | Facility: CLINIC | Age: 64
End: 2019-10-08

## 2019-10-08 ENCOUNTER — OFFICE VISIT (OUTPATIENT)
Dept: INTERNAL MEDICINE | Facility: CLINIC | Age: 64
End: 2019-10-08
Payer: COMMERCIAL

## 2019-10-08 VITALS
SYSTOLIC BLOOD PRESSURE: 104 MMHG | DIASTOLIC BLOOD PRESSURE: 62 MMHG | HEIGHT: 72 IN | WEIGHT: 254.5 LBS | TEMPERATURE: 98 F | HEART RATE: 69 BPM | BODY MASS INDEX: 34.47 KG/M2 | OXYGEN SATURATION: 97 %

## 2019-10-08 DIAGNOSIS — Z79.4 TYPE 2 DIABETES MELLITUS WITH HYPERGLYCEMIA, WITH LONG-TERM CURRENT USE OF INSULIN: ICD-10-CM

## 2019-10-08 DIAGNOSIS — I10 ESSENTIAL HYPERTENSION: ICD-10-CM

## 2019-10-08 DIAGNOSIS — E11.65 TYPE 2 DIABETES MELLITUS WITH HYPERGLYCEMIA, WITH LONG-TERM CURRENT USE OF INSULIN: ICD-10-CM

## 2019-10-08 DIAGNOSIS — E66.09 NON MORBID OBESITY DUE TO EXCESS CALORIES: ICD-10-CM

## 2019-10-08 DIAGNOSIS — R05.9 COUGH: Primary | ICD-10-CM

## 2019-10-08 DIAGNOSIS — B35.1 TOENAIL FUNGUS: ICD-10-CM

## 2019-10-08 DIAGNOSIS — R79.89 LOW TESTOSTERONE: ICD-10-CM

## 2019-10-08 PROCEDURE — 3008F BODY MASS INDEX DOCD: CPT | Mod: CPTII,S$GLB,, | Performed by: INTERNAL MEDICINE

## 2019-10-08 PROCEDURE — 3078F PR MOST RECENT DIASTOLIC BLOOD PRESSURE < 80 MM HG: ICD-10-PCS | Mod: CPTII,S$GLB,, | Performed by: INTERNAL MEDICINE

## 2019-10-08 PROCEDURE — 3074F PR MOST RECENT SYSTOLIC BLOOD PRESSURE < 130 MM HG: ICD-10-PCS | Mod: CPTII,S$GLB,, | Performed by: INTERNAL MEDICINE

## 2019-10-08 PROCEDURE — 99999 PR PBB SHADOW E&M-EST. PATIENT-LVL III: CPT | Mod: PBBFAC,,, | Performed by: INTERNAL MEDICINE

## 2019-10-08 PROCEDURE — 99999 PR PBB SHADOW E&M-EST. PATIENT-LVL III: ICD-10-PCS | Mod: PBBFAC,,, | Performed by: INTERNAL MEDICINE

## 2019-10-08 PROCEDURE — 3074F SYST BP LT 130 MM HG: CPT | Mod: CPTII,S$GLB,, | Performed by: INTERNAL MEDICINE

## 2019-10-08 PROCEDURE — 3078F DIAST BP <80 MM HG: CPT | Mod: CPTII,S$GLB,, | Performed by: INTERNAL MEDICINE

## 2019-10-08 PROCEDURE — 3008F PR BODY MASS INDEX (BMI) DOCUMENTED: ICD-10-PCS | Mod: CPTII,S$GLB,, | Performed by: INTERNAL MEDICINE

## 2019-10-08 PROCEDURE — 99214 PR OFFICE/OUTPT VISIT, EST, LEVL IV, 30-39 MIN: ICD-10-PCS | Mod: S$GLB,,, | Performed by: INTERNAL MEDICINE

## 2019-10-08 PROCEDURE — 99214 OFFICE O/P EST MOD 30 MIN: CPT | Mod: S$GLB,,, | Performed by: INTERNAL MEDICINE

## 2019-10-08 RX ORDER — TERBINAFINE HYDROCHLORIDE 250 MG/1
250 TABLET ORAL DAILY
Qty: 30 TABLET | Refills: 1 | Status: SHIPPED | OUTPATIENT
Start: 2019-10-08 | End: 2019-11-07

## 2019-10-08 RX ORDER — LOSARTAN POTASSIUM AND HYDROCHLOROTHIAZIDE 25; 100 MG/1; MG/1
1 TABLET ORAL DAILY
Qty: 90 TABLET | Refills: 3 | Status: SHIPPED | OUTPATIENT
Start: 2019-10-08 | End: 2019-10-29 | Stop reason: SDUPTHER

## 2019-10-08 RX ORDER — INSULIN GLARGINE 100 [IU]/ML
INJECTION, SOLUTION SUBCUTANEOUS
Qty: 15 ML | Refills: 5 | Status: SHIPPED | OUTPATIENT
Start: 2019-10-08 | End: 2020-11-18 | Stop reason: SDUPTHER

## 2019-10-08 NOTE — TELEPHONE ENCOUNTER
Performed eye cam on this patient, images were very poor. Informed patient that he will need to go to an eye doctor for his eye exam.

## 2019-10-08 NOTE — PROGRESS NOTES
INTERNAL MEDICINE    Patient Active Problem List   Diagnosis    Hypertension    ED (erectile dysfunction)    DDD (degenerative disc disease), lumbar    Non morbid obesity due to excess calories    Low testosterone    Nocturia    Facial cellulitis    Type 2 diabetes mellitus with hyperglycemia       CC:   Chief Complaint   Patient presents with    Cough     for the past month and diabetic issues     Diabetes       SUBJECTIVE:  Sj Gonzalez   is a 64 y.o. male  HPI   64y/oWM has had a constant dry cough for at least 1 month. No associated SOB, mucous production,wheezing,nasal congestion,post nasal drip or sore throat.  He has not been in contact with anyone infectious, nor has he traveled anywhere.  Is on an ACE. Has been for years.    Known to be an insulin dependent diabetic, does not check his blood sugars routinely.  However, denies any symptoms of hypoglycemia.  Does not follow a low carb,low sugar diet. Exercise intermittent.  He has gained 20# since last visit, but does not feel any symptoms of reflux.    ROS: Review of Systems   Constitutional: Negative for activity change, appetite change, chills, diaphoresis, fatigue and fever.   HENT: Negative for congestion, ear pain, facial swelling, postnasal drip, rhinorrhea, sinus pressure, sinus pain, sneezing, sore throat, tinnitus and voice change.    Eyes: Negative.    Respiratory: Positive for cough. Negative for choking, chest tightness, shortness of breath and wheezing.    Cardiovascular: Negative for chest pain, palpitations and leg swelling.   Gastrointestinal: Negative for abdominal distention, abdominal pain, constipation, diarrhea, nausea and vomiting.   Endocrine: Negative for polydipsia, polyphagia and polyuria.   Genitourinary: Negative.    Musculoskeletal: Negative.    Neurological: Negative for dizziness, weakness, light-headedness and headaches.   Hematological: Negative.    Psychiatric/Behavioral: Negative.        Past Medical History:  "  Diagnosis Date    Heart murmur     MVP    Hypertension     Urinary tract infection        Past Surgical History:   Procedure Laterality Date    SPINE SURGERY      Lumbar       Family History   Problem Relation Age of Onset    No Known Problems Mother     No Known Problems Father     Diabetes Sister     Diabetes Brother     No Known Problems Daughter     Cancer Neg Hx     Heart disease Neg Hx     Prostate cancer Neg Hx     Kidney disease Neg Hx        Social History     Tobacco Use    Smoking status: Never Smoker    Smokeless tobacco: Never Used   Substance Use Topics    Alcohol use: Yes    Drug use: No       Social History     Social History Narrative    He lives in [facility]. He lives with his wife and 2 children. His children are attending [employer]. He lived [place]. . Nonsmoker. Social drinker. 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 His son attended [employer].        ALLERGIES:   Review of patient's allergies indicates:   Allergen Reactions    Lisinopril     Metformin      GI intolerance       MEDS:   Current Outpatient Medications:     amLODIPine (NORVASC) 10 MG tablet, TAKE 1 TABLET BY MOUTH ONCE DAILY, Disp: 90 tablet, Rfl: 0    blood sugar diagnostic Strp, 1 strip by Misc.(Non-Drug; Combo Route) route 2 (two) times daily before meals., Disp: 100 strip, Rfl: 11    clotrimazole-betamethasone 1-0.05% (LOTRISONE) cream, Apply topically 2 (two) times daily., Disp: 45 g, Rfl: 5    glipiZIDE (GLUCOTROL) 10 MG TR24, Take 1 tablet (10 mg total) by mouth daily with breakfast., Disp: 90 tablet, Rfl: 3    insulin (BASAGLAR KWIKPEN U-100 INSULIN) glargine 100 units/mL (3mL) SubQ pen, Inject 25 units into the skin every evening, Disp: 15 mL, Rfl: 5    lisinopril-hydrochlorothiazide (PRINZIDE,ZESTORETIC) 20-12.5 mg per tablet, TAKE 1 TABLET BY MOUTH ONCE DAILY, Disp: 90 tablet, Rfl: 0    pen needle, diabetic 32 gauge x 5/32" Ndle, Use nightly as directed with insulin pen., Disp: 100 " each, Rfl: 5    losartan-hydrochlorothiazide 100-25 mg (HYZAAR) 100-25 mg per tablet, Take 1 tablet by mouth once daily., Disp: 90 tablet, Rfl: 3    terbinafine HCl (LAMISIL) 250 mg tablet, Take 1 tablet (250 mg total) by mouth once daily., Disp: 30 tablet, Rfl: 1    testosterone cypionate (DEPOTESTOTERONE CYPIONATE) 200 mg/mL injection, Inject 1 mL (200 mg total) into the muscle every 14 (fourteen) days., Disp: 10 mL, Rfl: 1    OBJECTIVE:   Vitals:    10/08/19 1334   BP: 104/62   BP Location: Left arm   Patient Position: Sitting   BP Method: Large (Manual)   Pulse: 69   Temp: 98.1 °F (36.7 °C)   TempSrc: Oral   SpO2: 97%   Weight: 115.4 kg (254 lb 8 oz)   Height: 6' (1.829 m)     Body mass index is 34.52 kg/m².    Physical Exam   Constitutional: He is oriented to person, place, and time. He appears well-developed and well-nourished.   HENT:   Head: Normocephalic and atraumatic.   Right Ear: External ear normal.   Left Ear: External ear normal.   Nose: Nose normal.   Mouth/Throat: Oropharynx is clear and moist.   Eyes: Pupils are equal, round, and reactive to light. Conjunctivae and EOM are normal.   Neck: Normal range of motion. Neck supple.   Cardiovascular: Normal rate, regular rhythm and normal heart sounds.   Pulmonary/Chest: Effort normal and breath sounds normal.   Abdominal: Soft. Bowel sounds are normal.   Musculoskeletal: Normal range of motion.   Neurological: He is alert and oriented to person, place, and time.   Skin: Skin is warm and dry.   Toenail fungus   Psychiatric: He has a normal mood and affect. His behavior is normal.   Nursing note and vitals reviewed.        PERTINENT RESULTS:   CBC:  Recent Labs   Lab Result Units 10/08/19  1455   WBC K/uL 8.74   RBC M/uL 4.61   Hemoglobin g/dL 15.2   Hematocrit % 43.2   Platelets K/uL 198   Mean Corpuscular Volume fL 94   Mean Corpuscular Hemoglobin pg 33.0*   Mean Corpuscular Hemoglobin Conc g/dL 35.2     CMP:  Recent Labs   Lab Result Units  10/08/19  1455   Glucose mg/dL 266*   Calcium mg/dL 8.7   Albumin g/dL 4.2   Total Protein g/dL 7.6   Sodium mmol/L 137   Potassium mmol/L 4.2   CO2 mmol/L 26   Chloride mmol/L 102   BUN, Bld mg/dL 17   Alkaline Phosphatase U/L 52   ALT U/L 42   AST U/L 41   Total Bilirubin mg/dL 0.8     URINALYSIS:  No results for input(s): COLORU, CLARITYU, SPECGRAV, PHUR, PROTEINUA, GLUCOSEU, BILIRUBINCON, BLOODU, WBCU, RBCU, BACTERIA, MUCUS, NITRITE, LEUKOCYTESUR, UROBILINOGEN, HYALINECASTS in the last 2160 hours.   LIPIDS:  No results for input(s): TSH, HDL, CHOL, TRIG, LDLCALC, CHOLHDL, NONHDLCHOL, TOTALCHOLEST in the last 2160 hours.          ASSESSMENT:  Problem List Items Addressed This Visit        Cardiac/Vascular    Hypertension    Relevant Medications    losartan-hydrochlorothiazide 100-25 mg (HYZAAR) 100-25 mg per tablet       Endocrine    Non morbid obesity due to excess calories    Low testosterone    Relevant Orders    Testosterone (Completed)    Type 2 diabetes mellitus with hyperglycemia    Relevant Medications    insulin (BASAGLAR KWIKPEN U-100 INSULIN) glargine 100 units/mL (3mL) SubQ pen    Other Relevant Orders    Hemoglobin A1c (Completed)    Microalbumin/creatinine urine ratio (Completed)    CBC auto differential (Completed)    Comprehensive metabolic panel (Completed)      Other Visit Diagnoses     Cough    -  Primary    Toenail fungus        Relevant Medications    terbinafine HCl (LAMISIL) 250 mg tablet          PLAN:   Orders Placed This Encounter    Testosterone    Hemoglobin A1c    Microalbumin/creatinine urine ratio    CBC auto differential    Comprehensive metabolic panel    terbinafine HCl (LAMISIL) 250 mg tablet    insulin (BASAGLAR KWIKPEN U-100 INSULIN) glargine 100 units/mL (3mL) SubQ pen    losartan-hydrochlorothiazide 100-25 mg (HYZAAR) 100-25 mg per tablet     Orders Placed This Encounter   Procedures    Testosterone     Standing Status:   Future     Number of Occurrences:   1      Standing Expiration Date:   10/7/2020    Hemoglobin A1c     Standing Status:   Future     Number of Occurrences:   1     Standing Expiration Date:   10/7/2020    Microalbumin/creatinine urine ratio     Standing Status:   Future     Number of Occurrences:   1     Standing Expiration Date:   10/7/2020     Order Specific Question:   Specimen Source     Answer:   Urine    CBC auto differential     Standing Status:   Future     Number of Occurrences:   1     Standing Expiration Date:   10/7/2020    Comprehensive metabolic panel     Standing Status:   Future     Number of Occurrences:   1     Standing Expiration Date:   10/7/2020       Follow-up with me in 3months, unless labs preclude this..   Dr. Micaela Silva  Internal Medicine

## 2019-10-14 ENCOUNTER — TELEPHONE (OUTPATIENT)
Dept: INTERNAL MEDICINE | Facility: CLINIC | Age: 64
End: 2019-10-14

## 2019-10-14 NOTE — TELEPHONE ENCOUNTER
----- Message from Lito Lyon sent at 10/14/2019 12:42 PM CDT -----  Contact: wife [phone] or [phone]  Patients wife is calling about the replacement medication. It was never called in. Please call and advise.

## 2019-10-14 NOTE — TELEPHONE ENCOUNTER
Spoke with pt wife on phone. Pt was trying to clarify the  medications her  is taking. Advised pt wife to stop  on losartan-hctz as he was already prescribed lisinopril-hctz and amlodipine. Pt wife was also inquiring about a diabetic medication replacement for glipizide. Informed pt wife that provider is out until tomorrow but we will clarify once she comes in. Pt wife verbalized understanding.

## 2019-10-29 ENCOUNTER — OFFICE VISIT (OUTPATIENT)
Dept: INTERNAL MEDICINE | Facility: CLINIC | Age: 64
End: 2019-10-29
Payer: COMMERCIAL

## 2019-10-29 ENCOUNTER — PATIENT OUTREACH (OUTPATIENT)
Dept: ADMINISTRATIVE | Facility: OTHER | Age: 64
End: 2019-10-29

## 2019-10-29 VITALS
SYSTOLIC BLOOD PRESSURE: 118 MMHG | HEART RATE: 66 BPM | BODY MASS INDEX: 34.05 KG/M2 | OXYGEN SATURATION: 96 % | RESPIRATION RATE: 18 BRPM | DIASTOLIC BLOOD PRESSURE: 72 MMHG | HEIGHT: 72 IN | WEIGHT: 251.38 LBS | TEMPERATURE: 98 F

## 2019-10-29 DIAGNOSIS — R79.89 LOW TESTOSTERONE: Primary | ICD-10-CM

## 2019-10-29 DIAGNOSIS — Z79.4 TYPE 2 DIABETES MELLITUS WITH HYPERGLYCEMIA, WITH LONG-TERM CURRENT USE OF INSULIN: ICD-10-CM

## 2019-10-29 DIAGNOSIS — N52.9 ERECTILE DYSFUNCTION, UNSPECIFIED ERECTILE DYSFUNCTION TYPE: ICD-10-CM

## 2019-10-29 DIAGNOSIS — E11.65 TYPE 2 DIABETES MELLITUS WITH HYPERGLYCEMIA, WITH LONG-TERM CURRENT USE OF INSULIN: ICD-10-CM

## 2019-10-29 DIAGNOSIS — Z13.6 SCREENING FOR CARDIOVASCULAR CONDITION: ICD-10-CM

## 2019-10-29 DIAGNOSIS — I10 ESSENTIAL HYPERTENSION: ICD-10-CM

## 2019-10-29 PROCEDURE — 99999 PR PBB SHADOW E&M-EST. PATIENT-LVL IV: CPT | Mod: PBBFAC,,, | Performed by: INTERNAL MEDICINE

## 2019-10-29 PROCEDURE — 3008F BODY MASS INDEX DOCD: CPT | Mod: CPTII,S$GLB,, | Performed by: INTERNAL MEDICINE

## 2019-10-29 PROCEDURE — 3074F SYST BP LT 130 MM HG: CPT | Mod: CPTII,S$GLB,, | Performed by: INTERNAL MEDICINE

## 2019-10-29 PROCEDURE — 99214 PR OFFICE/OUTPT VISIT, EST, LEVL IV, 30-39 MIN: ICD-10-PCS | Mod: S$GLB,,, | Performed by: INTERNAL MEDICINE

## 2019-10-29 PROCEDURE — 3078F PR MOST RECENT DIASTOLIC BLOOD PRESSURE < 80 MM HG: ICD-10-PCS | Mod: CPTII,S$GLB,, | Performed by: INTERNAL MEDICINE

## 2019-10-29 PROCEDURE — 3078F DIAST BP <80 MM HG: CPT | Mod: CPTII,S$GLB,, | Performed by: INTERNAL MEDICINE

## 2019-10-29 PROCEDURE — 3074F PR MOST RECENT SYSTOLIC BLOOD PRESSURE < 130 MM HG: ICD-10-PCS | Mod: CPTII,S$GLB,, | Performed by: INTERNAL MEDICINE

## 2019-10-29 PROCEDURE — 3008F PR BODY MASS INDEX (BMI) DOCUMENTED: ICD-10-PCS | Mod: CPTII,S$GLB,, | Performed by: INTERNAL MEDICINE

## 2019-10-29 PROCEDURE — 99999 PR PBB SHADOW E&M-EST. PATIENT-LVL IV: ICD-10-PCS | Mod: PBBFAC,,, | Performed by: INTERNAL MEDICINE

## 2019-10-29 PROCEDURE — 99214 OFFICE O/P EST MOD 30 MIN: CPT | Mod: S$GLB,,, | Performed by: INTERNAL MEDICINE

## 2019-10-29 RX ORDER — TESTOSTERONE CYPIONATE 200 MG/ML
200 INJECTION, SOLUTION INTRAMUSCULAR
Qty: 10 ML | Refills: 1 | Status: SHIPPED | OUTPATIENT
Start: 2019-10-29 | End: 2020-08-24

## 2019-10-29 RX ORDER — PEN NEEDLE, DIABETIC 30 GX3/16"
NEEDLE, DISPOSABLE MISCELLANEOUS
Qty: 100 EACH | Refills: 5 | Status: SHIPPED | OUTPATIENT
Start: 2019-10-29 | End: 2021-03-01 | Stop reason: SDUPTHER

## 2019-10-29 RX ORDER — LOSARTAN POTASSIUM AND HYDROCHLOROTHIAZIDE 25; 100 MG/1; MG/1
1 TABLET ORAL DAILY
Qty: 90 TABLET | Refills: 3 | Status: SHIPPED | OUTPATIENT
Start: 2019-10-29 | End: 2020-09-23

## 2019-10-29 NOTE — PROGRESS NOTES
INTERNAL MEDICINE    Patient Active Problem List   Diagnosis    Hypertension    ED (erectile dysfunction)    DDD (degenerative disc disease), lumbar    Non morbid obesity due to excess calories    Low testosterone    Nocturia    Facial cellulitis    Type 2 diabetes mellitus with hyperglycemia       CC:   Chief Complaint   Patient presents with    Follow-up       SUBJECTIVE:  Sj Gonzalez   is a 64 y.o. male  HPI   65y/o WM, here for  -T2DM on insulin ( just recently got on Baraclude) at 25units nightly. Intolerant to metformin. On Glypizide. Checks his sugars at best once daily. Range 150's- < 200.  HGBA1C    8.3  -hypogonadism, last testosterone level   232. He had been on 1cc every 2 weeks. Has no libido. No loss of muscle mass. No loss of secondary characteristics. Wife concerned.  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  -recently taken off of Lisinopril for a chonic cough. This has resolved.  He has lost 3# since last visit.           ROS: Review of Systems   Constitutional: Negative for activity change, appetite change and fatigue.   HENT: Negative.    Eyes: Negative.    Respiratory: Negative for cough, chest tightness, shortness of breath and wheezing.    Cardiovascular: Negative for chest pain and palpitations.   Gastrointestinal: Negative.    Endocrine: Negative.    Genitourinary: Negative for discharge, hematuria, penile pain, penile swelling, scrotal swelling and testicular pain.   Musculoskeletal: Negative.    Neurological: Negative for weakness and headaches.   Hematological: Negative.    Psychiatric/Behavioral: Negative.        Past Medical History:   Diagnosis Date    Heart murmur     MVP    Hypertension     Urinary tract infection        Past Surgical History:   Procedure Laterality Date    SPINE SURGERY      Lumbar       Family History   Problem Relation Age of Onset    No Known Problems Mother     No Known Problems Father      "Diabetes Sister     Diabetes Brother     No Known Problems Daughter     Cancer Neg Hx     Heart disease Neg Hx     Prostate cancer Neg Hx     Kidney disease Neg Hx        Social History     Tobacco Use    Smoking status: Never Smoker    Smokeless tobacco: Never Used   Substance Use Topics    Alcohol use: Yes    Drug use: No       Social History     Social History Narrative    He lives in [facility]. He lives with his wife and 2 children. His children are attending [employer]. He lived [place]. . Nonsmoker. Social drinker. 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 His son attended [employer].        ALLERGIES:   Review of patient's allergies indicates:   Allergen Reactions    Lisinopril     Metformin      GI intolerance       MEDS:   Current Outpatient Medications:     amLODIPine (NORVASC) 10 MG tablet, TAKE 1 TABLET BY MOUTH ONCE DAILY, Disp: 90 tablet, Rfl: 0    blood sugar diagnostic Strp, 1 strip by Misc.(Non-Drug; Combo Route) route 2 (two) times daily before meals., Disp: 100 strip, Rfl: 11    glipiZIDE (GLUCOTROL) 10 MG TR24, Take 1 tablet (10 mg total) by mouth daily with breakfast., Disp: 90 tablet, Rfl: 3    insulin (BASAGLAR KWIKPEN U-100 INSULIN) glargine 100 units/mL (3mL) SubQ pen, Inject 25 units into the skin every evening, Disp: 15 mL, Rfl: 5    losartan-hydrochlorothiazide 100-25 mg (HYZAAR) 100-25 mg per tablet, Take 1 tablet by mouth once daily., Disp: 90 tablet, Rfl: 3    pen needle, diabetic 32 gauge x 5/32" Ndle, Use nightly as directed with insulin pen., Disp: 100 each, Rfl: 5    terbinafine HCl (LAMISIL) 250 mg tablet, Take 1 tablet (250 mg total) by mouth once daily., Disp: 30 tablet, Rfl: 1    clotrimazole-betamethasone 1-0.05% (LOTRISONE) cream, Apply topically 2 (two) times daily. (Patient not taking: Reported on 10/29/2019), Disp: 45 g, Rfl: 5    testosterone cypionate (DEPOTESTOTERONE CYPIONATE) 200 mg/mL injection, Inject 1 mL (200 mg total) into the muscle " every 14 (fourteen) days., Disp: 10 mL, Rfl: 1    OBJECTIVE:   Vitals:    10/29/19 0843   BP: 118/72   BP Location: Left arm   Patient Position: Sitting   BP Method: Large (Manual)   Pulse: 66   Resp: 18   Temp: 98 °F (36.7 °C)   TempSrc: Oral   SpO2: 96%   Weight: 114 kg (251 lb 6.4 oz)   Height: 6' (1.829 m)     Body mass index is 34.1 kg/m².    Physical Exam   Constitutional: He is oriented to person, place, and time. He appears well-developed and well-nourished.   HENT:   Head: Normocephalic and atraumatic.   Right Ear: External ear normal.   Left Ear: External ear normal.   Nose: Nose normal.   Mouth/Throat: Oropharynx is clear and moist.   Eyes: Pupils are equal, round, and reactive to light. Conjunctivae and EOM are normal.   Neck: Normal range of motion. Neck supple.   Cardiovascular: Normal rate, regular rhythm and normal heart sounds.   Pulmonary/Chest: Effort normal and breath sounds normal.   Abdominal: Soft. Bowel sounds are normal.   Musculoskeletal: Normal range of motion.   Neurological: He is alert and oriented to person, place, and time.   Skin: Skin is warm and dry.   Psychiatric: He has a normal mood and affect. His behavior is normal.   Nursing note and vitals reviewed.        PERTINENT RESULTS:   CBC:  Recent Labs   Lab Result Units 10/08/19  1455   WBC K/uL 8.74   RBC M/uL 4.61   Hemoglobin g/dL 15.2   Hematocrit % 43.2   Platelets K/uL 198   Mean Corpuscular Volume fL 94   Mean Corpuscular Hemoglobin pg 33.0*   Mean Corpuscular Hemoglobin Conc g/dL 35.2     CMP:  Recent Labs   Lab Result Units 10/08/19  1455   Glucose mg/dL 266*   Calcium mg/dL 8.7   Albumin g/dL 4.2   Total Protein g/dL 7.6   Sodium mmol/L 137   Potassium mmol/L 4.2   CO2 mmol/L 26   Chloride mmol/L 102   BUN, Bld mg/dL 17   Alkaline Phosphatase U/L 52   ALT U/L 42   AST U/L 41   Total Bilirubin mg/dL 0.8     URINALYSIS:  No results for input(s): COLORU, CLARITYU, SPECGRAV, PHUR, PROTEINUA, GLUCOSEU, BILIRUBINCON, BLOODU,  "WBCU, RBCU, BACTERIA, MUCUS, NITRITE, LEUKOCYTESUR, UROBILINOGEN, HYALINECASTS in the last 2160 hours.   LIPIDS:  No results for input(s): TSH, HDL, CHOL, TRIG, LDLCALC, CHOLHDL, NONHDLCHOL, TOTALCHOLEST in the last 2160 hours.          ASSESSMENT:  Problem List Items Addressed This Visit        Cardiac/Vascular    Hypertension    Relevant Medications    losartan-hydrochlorothiazide 100-25 mg (HYZAAR) 100-25 mg per tablet    Other Relevant Orders    Comprehensive metabolic panel    CBC auto differential       Renal/    ED (erectile dysfunction)       Endocrine    Low testosterone - Primary    Relevant Medications    testosterone cypionate (DEPOTESTOTERONE CYPIONATE) 200 mg/mL injection    pen needle, diabetic 32 gauge x 5/32" Ndle    Other Relevant Orders    Ambulatory referral to Urology    Type 2 diabetes mellitus with hyperglycemia    Relevant Orders    Hemoglobin A1c    Microalbumin/creatinine urine ratio      Other Visit Diagnoses     Screening for cardiovascular condition        Relevant Orders    Lipid panel          PLAN:   Orders Placed This Encounter    Hemoglobin A1c    Comprehensive metabolic panel    CBC auto differential    Lipid panel    Microalbumin/creatinine urine ratio    Ambulatory referral to Urology    testosterone cypionate (DEPOTESTOTERONE CYPIONATE) 200 mg/mL injection    losartan-hydrochlorothiazide 100-25 mg (HYZAAR) 100-25 mg per tablet    pen needle, diabetic 32 gauge x 5/32" Ndle     Orders Placed This Encounter   Procedures    Hemoglobin A1c     Standing Status:   Future     Standing Expiration Date:   4/26/2020    Comprehensive metabolic panel     Standing Status:   Future     Standing Expiration Date:   10/28/2020    CBC auto differential     Standing Status:   Future     Standing Expiration Date:   10/28/2020    Lipid panel     Standing Status:   Future     Standing Expiration Date:   2/26/2020    Microalbumin/creatinine urine ratio     Standing Status:   Future     " Standing Expiration Date:   10/28/2020     Order Specific Question:   Specimen Source     Answer:   Urine    Ambulatory referral to Urology     Referral Priority:   Routine     Referral Type:   Consultation     Referral Reason:   Specialty Services Required     Referred to Provider:   Roman Interiano MD     Requested Specialty:   Urology     Number of Visits Requested:   1   Increase insulin to 32 units at night.  Try to exercise daily 30 min. Keep hydrated. See Urologist.  Have asked pt to come in 3 months to see Dr. Guerra/Nasrin, since I will not be here.      Follow-up with Other PCP in 3months.   Dr. Micaela Silva  Internal Medicine

## 2019-10-30 ENCOUNTER — OFFICE VISIT (OUTPATIENT)
Dept: UROLOGY | Facility: CLINIC | Age: 64
End: 2019-10-30
Payer: COMMERCIAL

## 2019-10-30 VITALS
HEART RATE: 80 BPM | WEIGHT: 251.31 LBS | BODY MASS INDEX: 34.04 KG/M2 | SYSTOLIC BLOOD PRESSURE: 126 MMHG | HEIGHT: 72 IN | DIASTOLIC BLOOD PRESSURE: 84 MMHG | TEMPERATURE: 99 F

## 2019-10-30 DIAGNOSIS — E11.59 TYPE 2 DIABETES MELLITUS WITH OTHER CIRCULATORY COMPLICATION, WITHOUT LONG-TERM CURRENT USE OF INSULIN: ICD-10-CM

## 2019-10-30 DIAGNOSIS — R68.82 LOW LIBIDO: ICD-10-CM

## 2019-10-30 DIAGNOSIS — N52.9 ERECTILE DYSFUNCTION, UNSPECIFIED ERECTILE DYSFUNCTION TYPE: ICD-10-CM

## 2019-10-30 DIAGNOSIS — R79.89 LOW TESTOSTERONE: Primary | ICD-10-CM

## 2019-10-30 PROCEDURE — 99214 OFFICE O/P EST MOD 30 MIN: CPT | Mod: S$GLB,,, | Performed by: UROLOGY

## 2019-10-30 PROCEDURE — 3079F PR MOST RECENT DIASTOLIC BLOOD PRESSURE 80-89 MM HG: ICD-10-PCS | Mod: CPTII,S$GLB,, | Performed by: UROLOGY

## 2019-10-30 PROCEDURE — 3079F DIAST BP 80-89 MM HG: CPT | Mod: CPTII,S$GLB,, | Performed by: UROLOGY

## 2019-10-30 PROCEDURE — 3074F SYST BP LT 130 MM HG: CPT | Mod: CPTII,S$GLB,, | Performed by: UROLOGY

## 2019-10-30 PROCEDURE — 99999 PR PBB SHADOW E&M-EST. PATIENT-LVL III: CPT | Mod: PBBFAC,,, | Performed by: UROLOGY

## 2019-10-30 PROCEDURE — 99999 PR PBB SHADOW E&M-EST. PATIENT-LVL III: ICD-10-PCS | Mod: PBBFAC,,, | Performed by: UROLOGY

## 2019-10-30 PROCEDURE — 3008F BODY MASS INDEX DOCD: CPT | Mod: CPTII,S$GLB,, | Performed by: UROLOGY

## 2019-10-30 PROCEDURE — 3074F PR MOST RECENT SYSTOLIC BLOOD PRESSURE < 130 MM HG: ICD-10-PCS | Mod: CPTII,S$GLB,, | Performed by: UROLOGY

## 2019-10-30 PROCEDURE — 3008F PR BODY MASS INDEX (BMI) DOCUMENTED: ICD-10-PCS | Mod: CPTII,S$GLB,, | Performed by: UROLOGY

## 2019-10-30 PROCEDURE — 99214 PR OFFICE/OUTPT VISIT, EST, LEVL IV, 30-39 MIN: ICD-10-PCS | Mod: S$GLB,,, | Performed by: UROLOGY

## 2019-10-30 NOTE — PATIENT INSTRUCTIONS
Trial of Trimix   TRT with 200 mg T Cyp q 2 weeks  Cialis 20 mg as needed  F/U 3 months  Pt to Review and consider IPP.

## 2019-10-30 NOTE — LETTER
October 30, 2019      Micaela Silva MD  [address] [facility]  [address]   [facility] [address]           [facility]  [address] [place] [address], [address]  [facility] [address]  Phone: [phone]  Fax: [phone]          Patient: Sj Gonzalez   MR Number: [id number]   YOB: 1955   Date of Visit: 10/30/2019       Dear Dr. Micaela Silva:    Thank you for referring Sj Gonzalez to me for evaluation. Attached you will find relevant portions of my assessment and plan of care.    If you have questions, please do not hesitate to call me. I look forward to following Sj Gonzalez along with you.    Sincerely,    Roman Interiano MD    Enclosure  CC:  No Recipients    If you would like to receive this communication electronically, please contact [email or web address] or [phone] to request more information on [employer] Link access.    For providers and/or their staff who would like to refer a patient to [facility], please contact us through our one-stop-shop provider referral line, [facility], at [phone].    If you feel you have received this communication in error or would no longer like to receive these types of communications, please e-mail [email or web address]

## 2019-10-30 NOTE — PROGRESS NOTES
Subjective:       Patient ID: Sj Gonzalez is a 64 y.o. male.    Chief Complaint: Follow-up    Other   This is a chronic (ED, Low Libido, Low T and DM) problem. The current episode started more than 1 year ago. The problem occurs constantly. The problem has been gradually worsening. Pertinent negatives include no abdominal pain, anorexia, arthralgias, change in bowel habit, chest pain, chills, congestion, coughing, diaphoresis, fatigue, fever, headaches, joint swelling, myalgias, nausea, neck pain, numbness, rash, sore throat, swollen glands, urinary symptoms, vertigo, visual change, vomiting or weakness. Nothing aggravates the symptoms. He has tried nothing for the symptoms. The treatment provided significant relief.     Review of Systems   Constitutional: Negative for activity change, appetite change, chills, diaphoresis, fatigue, fever and unexpected weight change.   HENT: Negative for congestion, hearing loss, sinus pressure, sore throat and trouble swallowing.    Eyes: Negative for photophobia, pain, discharge and visual disturbance.   Respiratory: Negative for apnea, cough and shortness of breath.    Cardiovascular: Negative for chest pain, palpitations and leg swelling.   Gastrointestinal: Negative for abdominal distention, abdominal pain, anal bleeding, anorexia, blood in stool, change in bowel habit, constipation, diarrhea, nausea, rectal pain and vomiting.   Endocrine: Negative for cold intolerance, heat intolerance, polydipsia, polyphagia and polyuria.   Genitourinary: Negative for decreased urine volume, difficulty urinating, discharge, dysuria, enuresis, flank pain, frequency, genital sores, hematuria, penile pain, penile swelling, scrotal swelling, testicular pain and urgency.   Musculoskeletal: Negative for arthralgias, back pain, joint swelling, myalgias and neck pain.   Skin: Negative for color change, pallor, rash and wound.   Allergic/Immunologic: Negative for environmental allergies, food  allergies and immunocompromised state.   Neurological: Negative for dizziness, vertigo, seizures, weakness, numbness and headaches.   Hematological: Negative for adenopathy. Does not bruise/bleed easily.   Psychiatric/Behavioral: Negative.        Objective:      Physical Exam   Nursing note and vitals reviewed.  Constitutional: He is oriented to person, place, and time. He appears well-developed and well-nourished.   HENT:   Head: Normocephalic.   Nose: Nose normal.   Mouth/Throat: Oropharynx is clear and moist.   Eyes: Conjunctivae and EOM are normal. Pupils are equal, round, and reactive to light.   Neck: Normal range of motion. Neck supple.   Cardiovascular: Normal rate, regular rhythm, normal heart sounds and intact distal pulses.    Pulmonary/Chest: Effort normal and breath sounds normal.   Abdominal: Soft. Bowel sounds are normal.   Genitourinary: Penis normal.   Musculoskeletal: Normal range of motion.   Neurological: He is alert and oriented to person, place, and time. He has normal reflexes.   Skin: Skin is warm and dry.     Psychiatric: He has a normal mood and affect. His behavior is normal. Judgment and thought content normal.     TRT  And Trimix teaching done  Assessment:       1. Low testosterone    2. Erectile dysfunction, unspecified erectile dysfunction type    3. Low libido    4. Type 2 diabetes mellitus with other circulatory complication, without long-term current use of insulin        Plan:       Patient Instructions   Trial of Trimix   TRT with 200 mg T Cyp q 2 weeks  Cialis 20 mg as needed  F/U 3 months

## 2019-11-06 DIAGNOSIS — N52.1 ERECTILE DISORDER DUE TO MEDICAL CONDITION IN MALE: Primary | ICD-10-CM

## 2019-11-06 DIAGNOSIS — N40.0 BENIGN PROSTATIC HYPERPLASIA WITHOUT LOWER URINARY TRACT SYMPTOMS: ICD-10-CM

## 2019-11-06 RX ORDER — SODIUM CHLORIDE 9 MG/ML
INJECTION, SOLUTION INTRAVENOUS CONTINUOUS
Status: CANCELLED | OUTPATIENT
Start: 2019-11-06

## 2019-11-06 RX ORDER — CIPROFLOXACIN 2 MG/ML
400 INJECTION, SOLUTION INTRAVENOUS
Status: CANCELLED | OUTPATIENT
Start: 2019-11-06

## 2019-11-16 ENCOUNTER — OFFICE VISIT (OUTPATIENT)
Dept: URGENT CARE | Facility: CLINIC | Age: 64
End: 2019-11-16
Payer: COMMERCIAL

## 2019-11-16 VITALS
SYSTOLIC BLOOD PRESSURE: 125 MMHG | HEART RATE: 76 BPM | BODY MASS INDEX: 33.05 KG/M2 | TEMPERATURE: 98 F | DIASTOLIC BLOOD PRESSURE: 82 MMHG | HEIGHT: 72 IN | WEIGHT: 244 LBS | RESPIRATION RATE: 16 BRPM | OXYGEN SATURATION: 96 %

## 2019-11-16 DIAGNOSIS — M79.5 FOREIGN BODY (FB) IN SOFT TISSUE: Primary | ICD-10-CM

## 2019-11-16 PROCEDURE — 3079F DIAST BP 80-89 MM HG: CPT | Mod: CPTII,S$GLB,, | Performed by: NURSE PRACTITIONER

## 2019-11-16 PROCEDURE — 3008F PR BODY MASS INDEX (BMI) DOCUMENTED: ICD-10-PCS | Mod: CPTII,S$GLB,, | Performed by: NURSE PRACTITIONER

## 2019-11-16 PROCEDURE — 3079F PR MOST RECENT DIASTOLIC BLOOD PRESSURE 80-89 MM HG: ICD-10-PCS | Mod: CPTII,S$GLB,, | Performed by: NURSE PRACTITIONER

## 2019-11-16 PROCEDURE — 73552 X-RAY EXAM OF FEMUR 2/>: CPT | Mod: FY,LT,S$GLB, | Performed by: RADIOLOGY

## 2019-11-16 PROCEDURE — 73552 XR FEMUR 2 VIEW LEFT: ICD-10-PCS | Mod: FY,LT,S$GLB, | Performed by: RADIOLOGY

## 2019-11-16 PROCEDURE — 3074F SYST BP LT 130 MM HG: CPT | Mod: CPTII,S$GLB,, | Performed by: NURSE PRACTITIONER

## 2019-11-16 PROCEDURE — 3008F BODY MASS INDEX DOCD: CPT | Mod: CPTII,S$GLB,, | Performed by: NURSE PRACTITIONER

## 2019-11-16 PROCEDURE — 3074F PR MOST RECENT SYSTOLIC BLOOD PRESSURE < 130 MM HG: ICD-10-PCS | Mod: CPTII,S$GLB,, | Performed by: NURSE PRACTITIONER

## 2019-11-16 PROCEDURE — 99214 PR OFFICE/OUTPT VISIT, EST, LEVL IV, 30-39 MIN: ICD-10-PCS | Mod: S$GLB,,, | Performed by: NURSE PRACTITIONER

## 2019-11-16 PROCEDURE — 99214 OFFICE O/P EST MOD 30 MIN: CPT | Mod: S$GLB,,, | Performed by: NURSE PRACTITIONER

## 2019-11-16 NOTE — PROGRESS NOTES
Subjective:       Patient ID: Sj Gonzalez is a 64 y.o. male.    Vitals:  height is 6' (1.829 m) and weight is 110.7 kg (244 lb). His oral temperature is 98.3 °F (36.8 °C). His blood pressure is 125/82 and his pulse is 76. His respiration is 16 and oxygen saturation is 96%.     Chief Complaint: Foreign Body in Skin    Patient states a needle broke off in the left upper leg.    Other   This is a new problem. The current episode started today. The problem occurs constantly. The problem has been unchanged. Pertinent negatives include no abdominal pain, anorexia, arthralgias, change in bowel habit, chest pain, chills, congestion, coughing, diaphoresis, fatigue, fever, headaches, joint swelling, myalgias, nausea, neck pain, numbness, rash, sore throat, swollen glands, urinary symptoms, vertigo, visual change, vomiting or weakness. The symptoms are aggravated by coughing. The treatment provided no relief.       Constitution: Negative for chills, sweating, fatigue and fever.   HENT: Negative for congestion and sore throat.    Neck: Negative for neck pain.   Cardiovascular: Negative for chest pain.   Respiratory: Negative for cough.    Gastrointestinal: Negative for abdominal pain, nausea and vomiting.   Musculoskeletal: Negative for joint pain, joint swelling and muscle ache.   Skin: Positive for erythema (with no noted foreign body. ). Negative for rash.   Neurological: Negative for history of vertigo, headaches and numbness.       Objective:      Physical Exam   Constitutional: He is oriented to person, place, and time. He appears well-developed and well-nourished.   HENT:   Head: Normocephalic and atraumatic. Head is without abrasion, without contusion and without laceration.   Right Ear: External ear normal.   Left Ear: External ear normal.   Nose: Nose normal.   Mouth/Throat: Oropharynx is clear and moist and mucous membranes are normal.   Eyes: Pupils are equal, round, and reactive to light. Conjunctivae, EOM and  lids are normal.   Neck: Trachea normal, full passive range of motion without pain and phonation normal. Neck supple.   Cardiovascular: Normal rate, regular rhythm and normal heart sounds.   Pulmonary/Chest: Effort normal and breath sounds normal. No stridor. No respiratory distress.   Musculoskeletal: Normal range of motion.   Neurological: He is alert and oriented to person, place, and time.   Skin: Skin is warm, dry and no rash. Capillary refill takes less than 2 seconds. Lesions:  erythema (with no noted foreign body. )abrasion, burn, bruising and ecchymosis  Psychiatric: He has a normal mood and affect. His speech is normal and behavior is normal. Judgment and thought content normal. Cognition and memory are normal.   Nursing note and vitals reviewed.        Assessment:       1. Foreign body (FB) in soft tissue        Plan:     Retractable needle was given to patient and he wasn't aware the needle was in the syringe.  No issues.     Xr Femur 2 View Left    Result Date: 11/16/2019  EXAMINATION: XR FEMUR 2 VIEW LEFT CLINICAL HISTORY: Residual foreign body in soft tissue TECHNIQUE: AP and lateral views of the left femur were performed. COMPARISON: None} FINDINGS: Four views left femur. Degenerative changes are noted of the left femoroacetabular joint.  The left femoral head maintains appropriate relationship with the acetabulum.  No acute displaced fracture or dislocation of the femur.  Degenerative changes are noted of the knee.  No large knee joint effusion.  No convincing radiopaque foreign body.     1. No acute displaced fracture or dislocation of the left femur. Electronically signed by: Rah Sweeney MD Date:    11/16/2019 Time:    12:25    Foreign body (FB) in soft tissue  Comments:  resolved.   Orders:  -     XR FEMUR 2 VIEW LEFT; Future; Expected date: 11/16/2019

## 2019-11-19 ENCOUNTER — TELEPHONE (OUTPATIENT)
Dept: URGENT CARE | Facility: CLINIC | Age: 64
End: 2019-11-19

## 2019-11-27 ENCOUNTER — TELEPHONE (OUTPATIENT)
Dept: UROLOGY | Facility: CLINIC | Age: 64
End: 2019-11-27

## 2019-11-27 NOTE — TELEPHONE ENCOUNTER
----- Message from Nancy Rodríguez sent at 11/27/2019  8:56 AM CST -----  Contact: self, [phone] (M)  Patient states he was supposed to get a call regarding his surgery and insurance coverage. States it's supposed to be on 12/9.

## 2019-12-02 ENCOUNTER — TELEPHONE (OUTPATIENT)
Dept: UROLOGY | Facility: CLINIC | Age: 64
End: 2019-12-02

## 2019-12-02 NOTE — TELEPHONE ENCOUNTER
----- Message from Marisa Marion sent at 12/2/2019  3:34 PM CST -----  Contact: self / [phone]  Patient is requesting a call back regarding, has questions about his procedure. Please advise     As well needs lab orders

## 2019-12-03 ENCOUNTER — PATIENT MESSAGE (OUTPATIENT)
Dept: UROLOGY | Facility: CLINIC | Age: 64
End: 2019-12-03

## 2019-12-06 ENCOUNTER — TELEPHONE (OUTPATIENT)
Dept: UROLOGY | Facility: CLINIC | Age: 64
End: 2019-12-06

## 2019-12-09 ENCOUNTER — OFFICE VISIT (OUTPATIENT)
Dept: URGENT CARE | Facility: CLINIC | Age: 64
End: 2019-12-09
Payer: COMMERCIAL

## 2019-12-09 VITALS
RESPIRATION RATE: 20 BRPM | TEMPERATURE: 97 F | HEART RATE: 96 BPM | DIASTOLIC BLOOD PRESSURE: 96 MMHG | HEIGHT: 72 IN | OXYGEN SATURATION: 99 % | WEIGHT: 246 LBS | SYSTOLIC BLOOD PRESSURE: 140 MMHG | BODY MASS INDEX: 33.32 KG/M2

## 2019-12-09 DIAGNOSIS — J06.9 VIRAL URI WITH COUGH: Primary | ICD-10-CM

## 2019-12-09 LAB
CTP QC/QA: YES
FLUAV AG NPH QL: NEGATIVE
FLUBV AG NPH QL: NEGATIVE

## 2019-12-09 PROCEDURE — 96372 PR INJECTION,THERAP/PROPH/DIAG2ST, IM OR SUBCUT: ICD-10-PCS | Mod: S$GLB,,, | Performed by: NURSE PRACTITIONER

## 2019-12-09 PROCEDURE — 99214 PR OFFICE/OUTPT VISIT, EST, LEVL IV, 30-39 MIN: ICD-10-PCS | Mod: 25,S$GLB,, | Performed by: NURSE PRACTITIONER

## 2019-12-09 PROCEDURE — 87804 POCT INFLUENZA A/B: ICD-10-PCS | Mod: 59,QW,S$GLB, | Performed by: NURSE PRACTITIONER

## 2019-12-09 PROCEDURE — 96372 THER/PROPH/DIAG INJ SC/IM: CPT | Mod: S$GLB,,, | Performed by: NURSE PRACTITIONER

## 2019-12-09 PROCEDURE — 99214 OFFICE O/P EST MOD 30 MIN: CPT | Mod: 25,S$GLB,, | Performed by: NURSE PRACTITIONER

## 2019-12-09 PROCEDURE — 87804 INFLUENZA ASSAY W/OPTIC: CPT | Mod: QW,S$GLB,, | Performed by: NURSE PRACTITIONER

## 2019-12-09 RX ORDER — BENZONATATE 100 MG/1
200 CAPSULE ORAL 3 TIMES DAILY PRN
Qty: 30 CAPSULE | Refills: 0 | Status: SHIPPED | OUTPATIENT
Start: 2019-12-09 | End: 2019-12-19

## 2019-12-09 RX ORDER — CODEINE PHOSPHATE AND GUAIFENESIN 10; 100 MG/5ML; MG/5ML
5 SOLUTION ORAL 3 TIMES DAILY PRN
Qty: 118 ML | Refills: 0 | Status: SHIPPED | OUTPATIENT
Start: 2019-12-09 | End: 2019-12-16

## 2019-12-09 RX ORDER — BETAMETHASONE SODIUM PHOSPHATE AND BETAMETHASONE ACETATE 3; 3 MG/ML; MG/ML
9 INJECTION, SUSPENSION INTRA-ARTICULAR; INTRALESIONAL; INTRAMUSCULAR; SOFT TISSUE
Status: COMPLETED | OUTPATIENT
Start: 2019-12-09 | End: 2019-12-09

## 2019-12-09 RX ADMIN — BETAMETHASONE SODIUM PHOSPHATE AND BETAMETHASONE ACETATE 9 MG: 3; 3 INJECTION, SUSPENSION INTRA-ARTICULAR; INTRALESIONAL; INTRAMUSCULAR; SOFT TISSUE at 09:12

## 2019-12-09 NOTE — PROGRESS NOTES
Subjective:       Patient ID: Sj Gonzalez is a 64 y.o. male.    Vitals:  height is 6' (1.829 m) and weight is 111.6 kg (246 lb). His tympanic temperature is 96.7 °F (35.9 °C). His blood pressure is 140/96 (abnormal) and his pulse is 96. His respiration is 20 and oxygen saturation is 99%.     Chief Complaint: Sinus Problem    Sinus Problem   This is a new problem. The current episode started in the past 7 days. The problem has been gradually worsening since onset. There has been no fever. His pain is at a severity of 5/10. The pain is moderate. Associated symptoms include congestion, coughing, headaches and sneezing. Pertinent negatives include no chills, diaphoresis, ear pain, shortness of breath, sinus pressure or sore throat. Treatments tried: otc meds. The treatment provided mild relief.       Constitution: Negative for chills, sweating, fatigue and fever.   HENT: Positive for congestion. Negative for ear pain, sinus pain, sinus pressure, sore throat and voice change.    Neck: Negative for painful lymph nodes.   Eyes: Negative for eye redness.   Respiratory: Positive for cough and sputum production. Negative for chest tightness, bloody sputum, COPD, shortness of breath, stridor, wheezing and asthma.    Gastrointestinal: Negative for nausea and vomiting.   Musculoskeletal: Negative for muscle ache.   Skin: Negative for rash.   Allergic/Immunologic: Positive for sneezing. Negative for seasonal allergies and asthma.   Neurological: Positive for headaches.   Hematologic/Lymphatic: Negative for swollen lymph nodes.       Objective:      Physical Exam   Constitutional: He is oriented to person, place, and time. Vital signs are normal. He appears well-developed and well-nourished. He is active and cooperative.  Non-toxic appearance. He does not appear ill. No distress.   HENT:   Head: Normocephalic and atraumatic.   Right Ear: Hearing, tympanic membrane, abnromal external ear and ear canal normal.   Left Ear: Hearing,  tympanic membrane, abnormal external ear and ear canal normal.   Nose: Mucosal edema and rhinorrhea present. No nasal deformity. No epistaxis. Right sinus exhibits no maxillary sinus tenderness and no frontal sinus tenderness. Left sinus exhibits no maxillary sinus tenderness and no frontal sinus tenderness.   Mouth/Throat: Uvula is midline, oropharynx is clear and moist and mucous membranes are normal. No trismus in the jaw. Normal dentition. No uvula swelling. No oropharyngeal exudate, posterior oropharyngeal edema or posterior oropharyngeal erythema. No tonsillar exudate.   Eyes: Pupils are equal, round, and reactive to light. Conjunctivae, EOM and lids are normal. No scleral icterus.   Neck: Trachea normal, full passive range of motion without pain and phonation normal. Neck supple. No neck rigidity. No edema and no erythema present.   Cardiovascular: Normal rate, regular rhythm, normal heart sounds, intact distal pulses and normal pulses.   Pulses:       Radial pulses are 2+ on the right side, and 2+ on the left side.   Pulmonary/Chest: Effort normal and breath sounds normal. No stridor. No tachypnea and no bradypnea. No respiratory distress. He has no decreased breath sounds. He has no wheezes. He has no rhonchi. He has no rales.   Musculoskeletal: Normal range of motion. He exhibits no edema or deformity.   Neurological: He is alert and oriented to person, place, and time. He exhibits normal muscle tone. Coordination and gait normal.   Skin: Skin is warm, dry, intact, not diaphoretic and not pale. Capillary refill takes less than 2 seconds.   Psychiatric: He has a normal mood and affect. His speech is normal and behavior is normal. Judgment and thought content normal. Cognition and memory are normal.   Nursing note and vitals reviewed.        Assessment:       1. Viral URI with cough      Flu Negative  Plan:         Viral URI with cough  -     POCT Influenza A/B  -     guaifenesin-codeine 100-10 mg/5 ml  (TUSSI-ORGANIDIN NR)  mg/5 mL syrup; Take 5 mLs by mouth 3 (three) times daily as needed for Cough (Take at night for cough).  Dispense: 118 mL; Refill: 0  -     benzonatate (TESSALON PERLES) 100 MG capsule; Take 2 capsules (200 mg total) by mouth 3 (three) times daily as needed for Cough.  Dispense: 30 capsule; Refill: 0  -     betamethasone acetate-betamethasone sodium phosphate injection 9 mg      Patient Instructions   Flu today read NEGATIVE    You received a steroid shot today - this can elevate your blood pressure, elevate your blood sugar, water weight gain, nervous energy, redness to the face and dimpling of the skin where the shot goes in.     You have been diagnosed with a viral syndrome. Viral syndromes are self-limited and usually resolve within 10 days from onset of symptoms. Antibiotics are not effective against viral infections. It is important that you get lots of rest and drink plenty of fluids. Treatment is through symptomatic relief.    Below are suggestions for symptomatic relief:   -Tylenol every 4 hours OR ibuprofen every 6 hours as needed for pain/fever.   -Salt water gargles to soothe throat pain.   -Chloroseptic spray also helps to numb throat pain.   -Nasal saline spray reduces inflammation and dryness.   -Warm face compresses to help with facial sinus pain/pressure.   -Vicks vapor rub at night.   -Flonase OTC or Nasacort OTC for nasal congestion.   -Simple foods like chicken noodle soup.   -Delsym helps with coughing at night   -Zyrtec/Claritin during the day & Benadryl at night may help with allergies.     If you DO NOT have Hypertension or any history of palpitations, it is ok to take over the counter Sudafed or Mucinex D or Allegra-D or Claritin-D or Zyrtec-D.  If you do take one of the above, it is ok to combine that with plain over the counter Mucinex or Allegra or Claritin or Zyrtec. If, for example, you are taking Zyrtec -D, you can combine that with Mucinex, but not  Mucinex-D.  If you are taking Mucinex-D, you can combine that with plain Allegra or Claritin or Zyrtec.   If you DO have Hypertension or palpitations, it is safe to take Coricidin HBP for relief of sinus symptoms.    Please return to clinic if symptoms have not subsided 10-14 days from onset, as your viral infection may have developed into a bacterial infection. It is at that time antibiotics would be indicated.       If you were prescribed a narcotic or controlled medication, do not drive or operate heavy equipment or machinery while taking these medications.  You must understand that you've received an Urgent Care treatment only and that you may be released before all your medical problems are known or treated. You, the patient, will arrange for follow up care as instructed.  Follow up with your PCP or specialty clinic as directed within 2-5 days if not improved or as needed.  You can call [phone] to schedule an appointment with the appropriate provider.  If your condition worsens we recommend that you receive another evaluation at the emergency room immediately or contact your primary medical clinics after hours call service to discuss your concerns.  Please return here or go to the Emergency Department for any concerns or worsening of condition.      Viral Upper Respiratory Illness (Adult)  You have a viral upper respiratory illness (URI), which is another term for the common cold. This illness is contagious during the first few days. It is spread through the air by coughing and sneezing. It may also be spread by direct contact (touching the sick person and then touching your own eyes, nose, or mouth). Frequent handwashing will decrease risk of spread. Most viral illnesses go away within 7 to 10 days with rest and simple home remedies. Sometimes the illness may last for several weeks. Antibiotics will not kill a virus, and they are generally not prescribed for this condition.    Home care  · If symptoms  are severe, rest at home for the first 2 to 3 days. When you resume activity, don't let yourself get too tired.  · Avoid being exposed to cigarette smoke (yours or others).  · You may use acetaminophen or ibuprofen to control pain and fever, unless another medicine was prescribed. (Note: If you have chronic liver or kidney disease, have ever had a stomach ulcer or gastrointestinal bleeding, or are taking blood-thinning medicines, talk with your healthcare provider before using these medicines.) Aspirin should never be given to anyone under 18 years of age who is ill with a viral infection or fever. It may cause severe liver or brain damage.  · Your appetite may be poor, so a light diet is fine. Avoid dehydration by drinking 6 to 8 glasses of fluids per day (water, soft drinks, juices, tea, or soup). Extra fluids will help loosen secretions in the nose and lungs.  · Over-the-counter cold medicines will not shorten the length of time youre sick, but they may be helpful for the following symptoms: cough, sore throat, and nasal and sinus congestion. (Note: Do not use decongestants if you have high blood pressure.)  Follow-up care  Follow up with your healthcare provider, or as advised.  When to seek medical advice  Call your healthcare provider right away if any of these occur:  · Cough with lots of colored sputum (mucus)  · Severe headache; face, neck, or ear pain  · Difficulty swallowing due to throat pain  · Fever of 100.4°F (38°C)  Call 911, or get immediate medical care  Call emergency services right away if any of these occur:  · Chest pain, shortness of breath, wheezing, or difficulty breathing  · Coughing up blood  · Inability to swallow due to throat pain  Date Last Reviewed: 9/13/2015  © 9707-1747 [employer]. [address] [place] [address], [place] [address]. All rights reserved. This information is not intended as a substitute for professional medical care. Always follow your healthcare  professional's instructions.

## 2019-12-09 NOTE — PROGRESS NOTES
Pharmacy called to see if they could substitute the guaifenesin with codeine to vertussin since they do not have the brand name in stock. Informed ok to substitute to the generic.

## 2019-12-09 NOTE — PATIENT INSTRUCTIONS
Flu today read NEGATIVE    You received a steroid shot today - this can elevate your blood pressure, elevate your blood sugar, water weight gain, nervous energy, redness to the face and dimpling of the skin where the shot goes in.     You have been diagnosed with a viral syndrome. Viral syndromes are self-limited and usually resolve within 10 days from onset of symptoms. Antibiotics are not effective against viral infections. It is important that you get lots of rest and drink plenty of fluids. Treatment is through symptomatic relief.    Below are suggestions for symptomatic relief:   -Tylenol every 4 hours OR ibuprofen every 6 hours as needed for pain/fever.   -Salt water gargles to soothe throat pain.   -Chloroseptic spray also helps to numb throat pain.   -Nasal saline spray reduces inflammation and dryness.   -Warm face compresses to help with facial sinus pain/pressure.   -Vicks vapor rub at night.   -Flonase OTC or Nasacort OTC for nasal congestion.   -Simple foods like chicken noodle soup.   -Delsym helps with coughing at night   -Zyrtec/Claritin during the day & Benadryl at night may help with allergies.     If you DO NOT have Hypertension or any history of palpitations, it is ok to take over the counter Sudafed or Mucinex D or Allegra-D or Claritin-D or Zyrtec-D.  If you do take one of the above, it is ok to combine that with plain over the counter Mucinex or Allegra or Claritin or Zyrtec. If, for example, you are taking Zyrtec -D, you can combine that with Mucinex, but not Mucinex-D.  If you are taking Mucinex-D, you can combine that with plain Allegra or Claritin or Zyrtec.   If you DO have Hypertension or palpitations, it is safe to take Coricidin HBP for relief of sinus symptoms.    Please return to clinic if symptoms have not subsided 10-14 days from onset, as your viral infection may have developed into a bacterial infection. It is at that time antibiotics would be indicated.       If you were  prescribed a narcotic or controlled medication, do not drive or operate heavy equipment or machinery while taking these medications.  You must understand that you've received an Urgent Care treatment only and that you may be released before all your medical problems are known or treated. You, the patient, will arrange for follow up care as instructed.  Follow up with your PCP or specialty clinic as directed within 2-5 days if not improved or as needed.  You can call [phone] to schedule an appointment with the appropriate provider.  If your condition worsens we recommend that you receive another evaluation at the emergency room immediately or contact your primary medical clinics after hours call service to discuss your concerns.  Please return here or go to the Emergency Department for any concerns or worsening of condition.      Viral Upper Respiratory Illness (Adult)  You have a viral upper respiratory illness (URI), which is another term for the common cold. This illness is contagious during the first few days. It is spread through the air by coughing and sneezing. It may also be spread by direct contact (touching the sick person and then touching your own eyes, nose, or mouth). Frequent handwashing will decrease risk of spread. Most viral illnesses go away within 7 to 10 days with rest and simple home remedies. Sometimes the illness may last for several weeks. Antibiotics will not kill a virus, and they are generally not prescribed for this condition.    Home care  · If symptoms are severe, rest at home for the first 2 to 3 days. When you resume activity, don't let yourself get too tired.  · Avoid being exposed to cigarette smoke (yours or others).  · You may use acetaminophen or ibuprofen to control pain and fever, unless another medicine was prescribed. (Note: If you have chronic liver or kidney disease, have ever had a stomach ulcer or gastrointestinal bleeding, or are taking blood-thinning medicines,  talk with your healthcare provider before using these medicines.) Aspirin should never be given to anyone under 18 years of age who is ill with a viral infection or fever. It may cause severe liver or brain damage.  · Your appetite may be poor, so a light diet is fine. Avoid dehydration by drinking 6 to 8 glasses of fluids per day (water, soft drinks, juices, tea, or soup). Extra fluids will help loosen secretions in the nose and lungs.  · Over-the-counter cold medicines will not shorten the length of time youre sick, but they may be helpful for the following symptoms: cough, sore throat, and nasal and sinus congestion. (Note: Do not use decongestants if you have high blood pressure.)  Follow-up care  Follow up with your healthcare provider, or as advised.  When to seek medical advice  Call your healthcare provider right away if any of these occur:  · Cough with lots of colored sputum (mucus)  · Severe headache; face, neck, or ear pain  · Difficulty swallowing due to throat pain  · Fever of 100.4°F (38°C)  Call 911, or get immediate medical care  Call emergency services right away if any of these occur:  · Chest pain, shortness of breath, wheezing, or difficulty breathing  · Coughing up blood  · Inability to swallow due to throat pain  Date Last Reviewed: 9/13/2015  © 4411-8535 [employer]. [address] [place] [address], [place] [address]. All rights reserved. This information is not intended as a substitute for professional medical care. Always follow your healthcare professional's instructions.

## 2019-12-12 ENCOUNTER — TELEPHONE (OUTPATIENT)
Dept: URGENT CARE | Facility: CLINIC | Age: 64
End: 2019-12-12

## 2020-01-04 DIAGNOSIS — I10 ESSENTIAL HYPERTENSION: Primary | ICD-10-CM

## 2020-01-06 ENCOUNTER — TELEPHONE (OUTPATIENT)
Dept: INTERNAL MEDICINE | Facility: CLINIC | Age: 65
End: 2020-01-06

## 2020-01-06 RX ORDER — AMLODIPINE BESYLATE 10 MG/1
TABLET ORAL
Qty: 90 TABLET | Refills: 0 | Status: SHIPPED | OUTPATIENT
Start: 2020-01-06 | End: 2020-07-28 | Stop reason: SDUPTHER

## 2020-01-06 NOTE — TELEPHONE ENCOUNTER
Please call pt. PCP retired and needs to establish with new PCP prior to further refills. Refilled for 90 days until he can get in. Labs ordered pt be done prior to visit.    1. Please schedule new PCP visit with me   - if pt opts to see Dr. Guerra, please do not schedule labs since I ordered them and notify her that pt will establish PCP with her  2. Schedule labs I ordered if establishing with me  Dr. Idalia Alexandra D.O.   [facility]

## 2020-05-13 DIAGNOSIS — E11.65 TYPE 2 DIABETES MELLITUS WITH HYPERGLYCEMIA, WITH LONG-TERM CURRENT USE OF INSULIN: ICD-10-CM

## 2020-05-13 DIAGNOSIS — I10 ESSENTIAL HYPERTENSION: Primary | ICD-10-CM

## 2020-05-13 DIAGNOSIS — Z79.4 TYPE 2 DIABETES MELLITUS WITH HYPERGLYCEMIA, WITH LONG-TERM CURRENT USE OF INSULIN: ICD-10-CM

## 2020-05-13 RX ORDER — GLIPIZIDE 10 MG/1
10 TABLET, FILM COATED, EXTENDED RELEASE ORAL
Qty: 30 TABLET | Refills: 0 | Status: SHIPPED | OUTPATIENT
Start: 2020-05-13 | End: 2020-06-10

## 2020-05-13 NOTE — TELEPHONE ENCOUNTER
----- Message from Aye Heard sent at 5/13/2020  9:18 AM CDT -----  Contact: [phone]/self  Type:  RX Refill Request    Who Called: PATIENT WIFE  Refill or New Rx:REFILL  RX Name and Strength:glipiZIDE (GLUCOTROL) 10 MG TR24  How is the patient currently taking it? (ex. 1XDay):1XDAY  Is this a 30 day or 90 day RX:90 DAY  [phone]  Local or Mail Order:LOCAL  Ordering Provider:DR SHOEMAKER  Would the patient rather a call back or a response via [facility]? CALLBACK   Best Call Back Number:[phone]  Additional Information: NONE

## 2020-05-13 NOTE — TELEPHONE ENCOUNTER
Please notify pt. Medications have been refilled for 30 days, but hw is due for an appointment prior to further refills. Labs are also due and have been ordered. Recommend labs be done at least 2-7 days before visit.   Dr. Idalia Alxeandra D.O.   [facility]

## 2020-05-13 NOTE — TELEPHONE ENCOUNTER
Spoke with pt, pt stated he will call back to schedule labs he did schedule a virtual visit in June.

## 2020-05-20 ENCOUNTER — TELEPHONE (OUTPATIENT)
Dept: FAMILY MEDICINE | Facility: CLINIC | Age: 65
End: 2020-05-20

## 2020-05-20 NOTE — TELEPHONE ENCOUNTER
Spoke to pt wife on phone. Clarified different blood pressure medications for pt wife and informed them of the virtual visit scheduled for June 4th and to go ahead and complete the pre-check. Pt wife verbalized understanding. Encouraged to call back if anything further was needed.

## 2020-05-20 NOTE — TELEPHONE ENCOUNTER
----- Message from Giuliana Ly sent at 5/20/2020 11:26 AM CDT -----  Contact: [phone]/ Wife, Flavia  Patient wife is requesting to speak with you regarding questions she has about his medication

## 2020-06-02 ENCOUNTER — TELEPHONE (OUTPATIENT)
Dept: FAMILY MEDICINE | Facility: CLINIC | Age: 65
End: 2020-06-02

## 2020-06-02 PROBLEM — L03.211 FACIAL CELLULITIS: Status: RESOLVED | Noted: 2018-08-13 | Resolved: 2020-06-02

## 2020-06-02 PROBLEM — Z79.4 TYPE 2 DIABETES MELLITUS WITH DIABETIC PERIPHERAL ANGIOPATHY WITHOUT GANGRENE, WITH LONG-TERM CURRENT USE OF INSULIN: Status: ACTIVE | Noted: 2019-10-30

## 2020-06-02 PROBLEM — E11.51 TYPE 2 DIABETES MELLITUS WITH DIABETIC PERIPHERAL ANGIOPATHY WITHOUT GANGRENE, WITH LONG-TERM CURRENT USE OF INSULIN: Status: ACTIVE | Noted: 2019-10-30

## 2020-06-02 NOTE — TELEPHONE ENCOUNTER
----- Message from Idalia Alexandra DO sent at 6/2/2020 10:51 AM CDT -----  appt 6/4/2020 and must have labs done prior.

## 2020-06-03 PROBLEM — R68.82 LOW LIBIDO: Status: RESOLVED | Noted: 2019-10-30 | Resolved: 2020-06-03

## 2020-06-10 ENCOUNTER — DOCUMENTATION ONLY (OUTPATIENT)
Dept: ADMINISTRATIVE | Facility: HOSPITAL | Age: 65
End: 2020-06-10

## 2020-06-10 ENCOUNTER — PATIENT OUTREACH (OUTPATIENT)
Dept: ADMINISTRATIVE | Facility: HOSPITAL | Age: 65
End: 2020-06-10

## 2020-06-10 DIAGNOSIS — Z79.4 TYPE 2 DIABETES MELLITUS WITH HYPERGLYCEMIA, WITH LONG-TERM CURRENT USE OF INSULIN: Primary | ICD-10-CM

## 2020-06-10 DIAGNOSIS — Z12.11 ENCOUNTER FOR SCREENING COLONOSCOPY: Primary | ICD-10-CM

## 2020-06-10 DIAGNOSIS — E11.65 TYPE 2 DIABETES MELLITUS WITH HYPERGLYCEMIA, WITH LONG-TERM CURRENT USE OF INSULIN: Primary | ICD-10-CM

## 2020-06-22 DIAGNOSIS — E11.65 TYPE 2 DIABETES MELLITUS WITH HYPERGLYCEMIA, WITH LONG-TERM CURRENT USE OF INSULIN: Primary | ICD-10-CM

## 2020-06-22 DIAGNOSIS — Z79.4 TYPE 2 DIABETES MELLITUS WITH HYPERGLYCEMIA, WITH LONG-TERM CURRENT USE OF INSULIN: Primary | ICD-10-CM

## 2020-06-24 ENCOUNTER — CLINICAL SUPPORT (OUTPATIENT)
Dept: FAMILY MEDICINE | Facility: CLINIC | Age: 65
End: 2020-06-24
Attending: INTERNAL MEDICINE
Payer: COMMERCIAL

## 2020-06-24 ENCOUNTER — OFFICE VISIT (OUTPATIENT)
Dept: FAMILY MEDICINE | Facility: CLINIC | Age: 65
End: 2020-06-24
Payer: COMMERCIAL

## 2020-06-24 ENCOUNTER — TELEPHONE (OUTPATIENT)
Dept: FAMILY MEDICINE | Facility: CLINIC | Age: 65
End: 2020-06-24

## 2020-06-24 VITALS
SYSTOLIC BLOOD PRESSURE: 130 MMHG | HEART RATE: 81 BPM | BODY MASS INDEX: 33.81 KG/M2 | HEIGHT: 72 IN | WEIGHT: 249.63 LBS | RESPIRATION RATE: 18 BRPM | DIASTOLIC BLOOD PRESSURE: 82 MMHG | TEMPERATURE: 98 F | OXYGEN SATURATION: 98 %

## 2020-06-24 DIAGNOSIS — E11.9 TYPE 2 DIABETES MELLITUS WITHOUT COMPLICATION, WITHOUT LONG-TERM CURRENT USE OF INSULIN: Primary | ICD-10-CM

## 2020-06-24 DIAGNOSIS — Z79.4 TYPE 2 DIABETES MELLITUS WITH HYPERGLYCEMIA, WITH LONG-TERM CURRENT USE OF INSULIN: ICD-10-CM

## 2020-06-24 DIAGNOSIS — R74.01 TRANSAMINITIS: ICD-10-CM

## 2020-06-24 DIAGNOSIS — E11.65 TYPE 2 DIABETES MELLITUS WITH HYPERGLYCEMIA, WITH LONG-TERM CURRENT USE OF INSULIN: ICD-10-CM

## 2020-06-24 DIAGNOSIS — I10 ESSENTIAL HYPERTENSION: ICD-10-CM

## 2020-06-24 DIAGNOSIS — E11.9 TYPE 2 DIABETES MELLITUS WITHOUT COMPLICATION, WITHOUT LONG-TERM CURRENT USE OF INSULIN: ICD-10-CM

## 2020-06-24 DIAGNOSIS — Z12.12 SCREENING FOR COLORECTAL CANCER: ICD-10-CM

## 2020-06-24 DIAGNOSIS — R79.89 LOW TESTOSTERONE: ICD-10-CM

## 2020-06-24 DIAGNOSIS — Z12.11 SCREENING FOR COLORECTAL CANCER: ICD-10-CM

## 2020-06-24 DIAGNOSIS — Z11.4 ENCOUNTER FOR SCREENING FOR HIV: ICD-10-CM

## 2020-06-24 DIAGNOSIS — E78.5 HYPERLIPIDEMIA LDL GOAL <70: ICD-10-CM

## 2020-06-24 PROCEDURE — 99999 PR PBB SHADOW E&M-EST. PATIENT-LVL I: ICD-10-PCS | Mod: PBBFAC,,,

## 2020-06-24 PROCEDURE — 3008F PR BODY MASS INDEX (BMI) DOCUMENTED: ICD-10-PCS | Mod: CPTII,S$GLB,, | Performed by: INTERNAL MEDICINE

## 2020-06-24 PROCEDURE — 2022F DIABETIC EYE SCREENING PHOTO: ICD-10-PCS | Mod: S$GLB,,, | Performed by: OPHTHALMOLOGY

## 2020-06-24 PROCEDURE — 99214 PR OFFICE/OUTPT VISIT, EST, LEVL IV, 30-39 MIN: ICD-10-PCS | Mod: S$GLB,,, | Performed by: INTERNAL MEDICINE

## 2020-06-24 PROCEDURE — 3052F HG A1C>EQUAL 8.0%<EQUAL 9.0%: CPT | Mod: CPTII,S$GLB,, | Performed by: INTERNAL MEDICINE

## 2020-06-24 PROCEDURE — 3079F PR MOST RECENT DIASTOLIC BLOOD PRESSURE 80-89 MM HG: ICD-10-PCS | Mod: CPTII,S$GLB,, | Performed by: INTERNAL MEDICINE

## 2020-06-24 PROCEDURE — 99999 PR PBB SHADOW E&M-EST. PATIENT-LVL I: CPT | Mod: PBBFAC,,,

## 2020-06-24 PROCEDURE — 92250 DIABETIC EYE SCREENING PHOTO: ICD-10-PCS | Mod: S$GLB,,, | Performed by: OPHTHALMOLOGY

## 2020-06-24 PROCEDURE — 3079F DIAST BP 80-89 MM HG: CPT | Mod: CPTII,S$GLB,, | Performed by: INTERNAL MEDICINE

## 2020-06-24 PROCEDURE — 3075F SYST BP GE 130 - 139MM HG: CPT | Mod: CPTII,S$GLB,, | Performed by: INTERNAL MEDICINE

## 2020-06-24 PROCEDURE — 99999 PR PBB SHADOW E&M-EST. PATIENT-LVL V: ICD-10-PCS | Mod: PBBFAC,,, | Performed by: INTERNAL MEDICINE

## 2020-06-24 PROCEDURE — 99999 PR PBB SHADOW E&M-EST. PATIENT-LVL V: CPT | Mod: PBBFAC,,, | Performed by: INTERNAL MEDICINE

## 2020-06-24 PROCEDURE — 99214 OFFICE O/P EST MOD 30 MIN: CPT | Mod: S$GLB,,, | Performed by: INTERNAL MEDICINE

## 2020-06-24 PROCEDURE — 92250 FUNDUS PHOTOGRAPHY W/I&R: CPT | Mod: S$GLB,,, | Performed by: OPHTHALMOLOGY

## 2020-06-24 PROCEDURE — 3008F BODY MASS INDEX DOCD: CPT | Mod: CPTII,S$GLB,, | Performed by: INTERNAL MEDICINE

## 2020-06-24 PROCEDURE — 2022F DILAT RTA XM EVC RTNOPTHY: CPT | Mod: S$GLB,,, | Performed by: OPHTHALMOLOGY

## 2020-06-24 PROCEDURE — 3052F PR MOST RECENT HEMOGLOBIN A1C LEVEL 8.0 - < 9.0%: ICD-10-PCS | Mod: CPTII,S$GLB,, | Performed by: INTERNAL MEDICINE

## 2020-06-24 PROCEDURE — 3075F PR MOST RECENT SYSTOLIC BLOOD PRESS GE 130-139MM HG: ICD-10-PCS | Mod: CPTII,S$GLB,, | Performed by: INTERNAL MEDICINE

## 2020-06-24 NOTE — PROGRESS NOTES
Answers for HPI/ROS submitted by the patient on 6/24/2020   Diabetes problem  Diabetes type: type 2  MedicAlert ID: No  Disease duration: 1 years  blurred vision: No  chest pain: No  fatigue: Yes  foot paresthesias: Yes  foot ulcerations: No  polydipsia: No  polyphagia: No  polyuria: No  visual change: No  weakness: No  weight loss: No  Symptom course: stable  confusion: No  dizziness: No  headaches: No  hunger: No  mood changes: No  nervous/anxious: No  pallor: No  seizures: No  sleepiness: Yes  speech difficulty: No  sweats: No  blackouts: No  hospitalization: No  nocturnal hypoglycemia: No  required assistance: No  required glucagon: No  CVA: No  heart disease: No  impotence: Yes  nephropathy: No  peripheral neuropathy: No  PVD: No  retinopathy: No  autonomic neuropathy: No  CAD risks: hypertension, obesity, diabetes mellitus  Current treatments: oral agent (dual therapy)  Treatment compliance: most of the time  Dose schedule: pre-breakfast  Given by: patient  Injection sites: abdominal wall  Home blood tests: 1-2 x per week  Home urines: <1 x per month  Monitoring compliance: inadequate  Blood glucose trend: fluctuating minimally  Weight trend: stable  Current diet: generally healthy  Meal planning: none  Exercise: intermittently  Dietitian visit: No  Eye exam current: No  Sees podiatrist: No    [facility] Note    Chief Complaint      Chief Complaint   Patient presents with    Establish Care     check up      History of Present Illness      Sj Gonzalez is a 64 y.o. male who presents today for chief complaint follow up chronic issues. Patient is new to me, previous PCP cait    PCP: Lori Bear MD  Patient comes to appointment alone.     Active Problem List with Overview Notes    Diagnosis Date Noted    Transaminitis 06/24/2020    Hyperlipidemia LDL goal <70 06/24/2020     Not at goal for dm        Type 2 diabetes mellitus with hyperglycemia 08/14/2018      Noncompliant   microalb 10/2019  A1c went from 8.3 to 8.8 in last 10 months, has never done diab ed, not watching diet, is on glipizide 10   Did nto tolerate metformin due to gi issues   Not checking bg   Not taking basaglar 25 as instructed   Was unaware of what a fasting BG is       Low testosterone 05/29/2018    Nocturia 05/29/2018    Class 1 obesity due to excess calories with serious comorbidity and body mass index (BMI) of 33.0 to 33.9 in adult 12/28/2016    DDD (degenerative disc disease), lumbar 04/16/2013    Essential hypertension 02/04/2013     At goal, h/o acei cough       ED (erectile dysfunction) 02/04/2013       HPI    Health Maintenance   Topic Date Due    Eye Exam  07/02/1967    Pneumococcal Vaccine (Medium Risk) (1 of 1 - PPSV23) 07/02/1976    Low Dose Statin  07/02/1978    Foot Exam  04/23/2020    Hemoglobin A1c  09/20/2020    Lipid Panel  06/20/2021    TETANUS VACCINE  08/14/2028    Hepatitis C Screening  Completed       Past Medical History:   Diagnosis Date    Heart murmur     MVP    Hypertension     Urinary tract infection        Past Surgical History:   Procedure Laterality Date    ARTHROSCOPY OF KNEE Right     SPINE SURGERY      Lumbar       family history includes Diabetes in his brother and sister; No Known Problems in his daughter, father, and mother.     Social History     Tobacco Use    Smoking status: Never Smoker    Smokeless tobacco: Never Used   Substance Use Topics    Alcohol use: Yes     Alcohol/week: 0.0 standard drinks     Comment: daily    Drug use: No       Review of Systems   Constitutional: Negative for chills, fever and weight loss.   HENT: Negative for congestion and sore throat.    Eyes: Negative for blurred vision and discharge.   Respiratory: Negative for cough and shortness of breath.    Cardiovascular: Negative for chest pain and palpitations.   Gastrointestinal: Negative for constipation, diarrhea, nausea and vomiting.   Genitourinary: Negative  "for dysuria and hematuria.   Musculoskeletal: Negative for falls and myalgias.   Skin: Negative for itching and rash.   Neurological: Negative for dizziness, seizures, weakness and headaches.   Endo/Heme/Allergies: Negative for polydipsia.   Psychiatric/Behavioral: The patient is not nervous/anxious.         Outpatient Encounter Medications as of 6/24/2020   Medication Sig Note Dispense Refill    amLODIPine (NORVASC) 10 MG tablet TAKE 1 TABLET BY MOUTH ONCE DAILY  90 tablet 0    blood sugar diagnostic Strp 1 strip by Misc.(Non-Drug; Combo Route) route 3 (three) times daily before meals.  100 strip 11    clotrimazole-betamethasone 1-0.05% (LOTRISONE) cream Apply topically 2 (two) times daily. 10/29/2019: Pt states never got cream 45 g 5    glipiZIDE (GLUCOTROL) 10 MG TR24 Take 1 tablet by mouth once daily with breakfast  30 tablet 0    insulin (BASAGLAR KWIKPEN U-100 INSULIN) glargine 100 units/mL (3mL) SubQ pen Inject 25 units into the skin every evening  15 mL 5    losartan-hydrochlorothiazide 100-25 mg (HYZAAR) 100-25 mg per tablet Take 1 tablet by mouth once daily.  90 tablet 3    pen needle, diabetic 32 gauge x 5/32" Ndle Use nightly as directed with insulin pen.  100 each 5    testosterone cypionate (DEPOTESTOTERONE CYPIONATE) 200 mg/mL injection Inject 1 mL (200 mg total) into the muscle every 14 (fourteen) days.  10 mL 1    [DISCONTINUED] blood sugar diagnostic Strp 1 strip by Misc.(Non-Drug; Combo Route) route 2 (two) times daily before meals.  100 strip 11     No facility-administered encounter medications on file as of 6/24/2020.        Review of patient's allergies indicates:   Allergen Reactions    Lisinopril     Metformin      GI intolerance         Physical Exam      Vital Signs  Temp: 97.8 °F (36.6 °C)  Temp src: Oral  Pulse: 81  Resp: 18  SpO2: 98 %  BP: 130/82  BP Location: Right arm  Patient Position: Sitting  Pain Score: 0-No pain  Height and Weight  Height: 6' (182.9 cm)  Weight: 113.2 " kg (249 lb 9.7 oz)  BSA (Calculated - sq m): 2.4 sq meters  BMI (Calculated): 33.8  Weight in (lb) to have BMI = 25: 183.9]    Physical Exam  Vitals signs reviewed.   Constitutional:       Appearance: He is well-developed.   HENT:      Head: Normocephalic and atraumatic.      Right Ear: External ear normal.      Left Ear: External ear normal.   Eyes:      General:         Right eye: No discharge.         Left eye: No discharge.   Neck:      Musculoskeletal: Normal range of motion.      Thyroid: No thyromegaly.   Cardiovascular:      Rate and Rhythm: Normal rate and regular rhythm.      Heart sounds: No murmur.   Pulmonary:      Effort: Pulmonary effort is normal. No respiratory distress.      Breath sounds: Normal breath sounds.   Abdominal:      General: Bowel sounds are normal. There is no distension.      Palpations: Abdomen is soft.      Tenderness: There is no abdominal tenderness.   Musculoskeletal: Normal range of motion.         General: No deformity.   Skin:     General: Skin is warm and dry.      Findings: No rash.   Neurological:      Mental Status: He is alert and oriented to person, place, and time.   Psychiatric:         Behavior: Behavior normal.          Laboratory:  CBC:  No results for input(s): WBC, RBC, HGB, HCT, PLT, MCV, MCH, MCHC in the last 2160 hours.  CMP:  Recent Labs   Lab Result Units 06/20/20  0900   Glucose mg/dL 224*   Calcium mg/dL 9.4   Albumin g/dL 4.4   Total Protein g/dL 7.4   Sodium mmol/L 140   Potassium mmol/L 5.0   CO2 mmol/L 30*   Chloride mmol/L 104   BUN, Bld mg/dL 17   Alkaline Phosphatase U/L 58   ALT U/L 56*   AST U/L 51*   Total Bilirubin mg/dL 0.8     URINALYSIS:  No results for input(s): COLORU, CLARITYU, SPECGRAV, PHUR, PROTEINUA, GLUCOSEU, BILIRUBINCON, BLOODU, WBCU, RBCU, BACTERIA, MUCUS, NITRITE, LEUKOCYTESUR, UROBILINOGEN, HYALINECASTS in the last 2160 hours.   LIPIDS:  Recent Labs   Lab Result Units 06/20/20  0900   HDL mg/dL 44   Cholesterol mg/dL 135    Triglycerides mg/dL 58   LDL Cholesterol mg/dL 79.4   Hdl/Cholesterol Ratio % 32.6   Non-HDL Cholesterol mg/dL 91   Total Cholesterol/HDL Ratio  3.1     TSH:  No results for input(s): TSH in the last 2160 hours.  A1C:  Recent Labs   Lab Result Units 06/20/20  0900   Hemoglobin A1C % 8.8*       Radiology:      Assessment/Plan     Sj Gonzalez is a 64 y.o.male with:    Essential hypertension  Change to ARB at follow up     Low testosterone  Labs ordered, back to [facility]     Type 2 diabetes mellitus with hyperglycemia  basaglar 25  Stop glipizide and start metformin 500qd will titrate up slowly   DM Ed, podiatry, eye exam  Refill test strips  Discussed testing at least bid with daily fasting checks   Discusses symptoms and management hypoglycemia      Hyperlipidemia LDL goal <70  Discuss statin and asa at follow up    Transaminitis  Zo cmp in 1 month       Orders Placed This Encounter   Procedures    Cologuard Screening (Multitarget Stool DNA)     Standing Status:   Future     Number of Occurrences:   1     Standing Expiration Date:   8/23/2021    Comprehensive metabolic panel     Standing Status:   Future     Standing Expiration Date:   8/23/2021    HIV 1/2 Ag/Ab (4th Gen)     Standing Status:   Future     Standing Expiration Date:   8/23/2021    Testosterone     Standing Status:   Future     Standing Expiration Date:   8/23/2021    Ambulatory referral/consult to Podiatry     Standing Status:   Future     Standing Expiration Date:   7/24/2021     Referral Priority:   Routine     Referral Type:   Consultation     Referral Reason:   Specialty Services Required     Requested Specialty:   Podiatry     Number of Visits Requested:   1    Ambulatory referral/consult to Diabetes Education     Standing Status:   Future     Standing Expiration Date:   6/24/2021     Referral Priority:   Routine     Referral Type:   Consultation     Referral Reason:   Specialty Services Required     Requested Specialty:   Diabetes      Number of Visits Requested:   1     Expiration Date:   6/24/2021    Diabetic Eye Screening Photo     Standing Status:   Future     Standing Expiration Date:   6/24/2021       -Continue current medications and maintain follow up with specialists.  Return to clinic in 2 weeks   Future Appointments   Date Time Provider Department Center   7/2/2020  3:00 PM Nathaniel Mazariegos MD [facility] [place]       Lori Bear MD  6/24/2020 4:27 PM    Primary Care Internal Medicine - [facility]

## 2020-06-24 NOTE — PROGRESS NOTES
Sj Gonzalez is a 64 y.o. male here for a diabetic eye screening with non-dilated fundus photos per Dr. Bear.    Patient cooperative?: Yes  Small pupils?: Yes  Last eye exam:     For exam results, see Encounter Report.

## 2020-06-24 NOTE — TELEPHONE ENCOUNTER
fyi /patient was seen today and states that he will call back to make an appt to get labs done in oct

## 2020-06-24 NOTE — ASSESSMENT & PLAN NOTE
basaglar 25  Stop glipizide and start metformin 500qd will titrate up slowly   DM Ed, podiatry, eye exam  Refill test strips  Discussed testing at least bid with daily fasting checks   Discusses symptoms and management hypoglycemia

## 2020-06-25 ENCOUNTER — PATIENT OUTREACH (OUTPATIENT)
Dept: ADMINISTRATIVE | Facility: OTHER | Age: 65
End: 2020-06-25

## 2020-06-25 ENCOUNTER — TELEPHONE (OUTPATIENT)
Dept: FAMILY MEDICINE | Facility: CLINIC | Age: 65
End: 2020-06-25

## 2020-06-25 NOTE — PROGRESS NOTES
Requested updates within Care Everywhere.  Patient's chart was reviewed for overdue HILARIO topics.  Immunizations reconciled.

## 2020-07-01 ENCOUNTER — TELEPHONE (OUTPATIENT)
Dept: OPHTHALMOLOGY | Facility: CLINIC | Age: 65
End: 2020-07-01

## 2020-07-01 NOTE — TELEPHONE ENCOUNTER
Called patient regarding diabetic eye screening results , gave patient her results , confirmed patient appointment

## 2020-07-02 ENCOUNTER — OFFICE VISIT (OUTPATIENT)
Dept: ENDOCRINOLOGY | Facility: CLINIC | Age: 65
End: 2020-07-02
Payer: COMMERCIAL

## 2020-07-02 VITALS
TEMPERATURE: 98 F | HEIGHT: 72 IN | BODY MASS INDEX: 33.86 KG/M2 | HEART RATE: 88 BPM | SYSTOLIC BLOOD PRESSURE: 139 MMHG | WEIGHT: 250 LBS | DIASTOLIC BLOOD PRESSURE: 71 MMHG

## 2020-07-02 DIAGNOSIS — E11.65 TYPE 2 DIABETES MELLITUS WITH HYPERGLYCEMIA, WITH LONG-TERM CURRENT USE OF INSULIN: ICD-10-CM

## 2020-07-02 DIAGNOSIS — Z79.4 TYPE 2 DIABETES MELLITUS WITH HYPERGLYCEMIA, WITH LONG-TERM CURRENT USE OF INSULIN: ICD-10-CM

## 2020-07-02 PROCEDURE — 3008F PR BODY MASS INDEX (BMI) DOCUMENTED: ICD-10-PCS | Mod: CPTII,S$GLB,, | Performed by: INTERNAL MEDICINE

## 2020-07-02 PROCEDURE — 3075F PR MOST RECENT SYSTOLIC BLOOD PRESS GE 130-139MM HG: ICD-10-PCS | Mod: CPTII,S$GLB,, | Performed by: INTERNAL MEDICINE

## 2020-07-02 PROCEDURE — 99204 PR OFFICE/OUTPT VISIT, NEW, LEVL IV, 45-59 MIN: ICD-10-PCS | Mod: S$GLB,,, | Performed by: INTERNAL MEDICINE

## 2020-07-02 PROCEDURE — 3008F BODY MASS INDEX DOCD: CPT | Mod: CPTII,S$GLB,, | Performed by: INTERNAL MEDICINE

## 2020-07-02 PROCEDURE — 2024F 7 FLD RTA PHOTO EVC RTNOPTHY: CPT | Mod: S$GLB,,, | Performed by: INTERNAL MEDICINE

## 2020-07-02 PROCEDURE — 3078F DIAST BP <80 MM HG: CPT | Mod: CPTII,S$GLB,, | Performed by: INTERNAL MEDICINE

## 2020-07-02 PROCEDURE — 99204 OFFICE O/P NEW MOD 45 MIN: CPT | Mod: S$GLB,,, | Performed by: INTERNAL MEDICINE

## 2020-07-02 PROCEDURE — 3052F PR MOST RECENT HEMOGLOBIN A1C LEVEL 8.0 - < 9.0%: ICD-10-PCS | Mod: CPTII,S$GLB,, | Performed by: INTERNAL MEDICINE

## 2020-07-02 PROCEDURE — 2024F PR 7 FIELD PHOTOS WITH INTERP/ REVIEW: ICD-10-PCS | Mod: S$GLB,,, | Performed by: INTERNAL MEDICINE

## 2020-07-02 PROCEDURE — 99999 PR PBB SHADOW E&M-EST. PATIENT-LVL IV: ICD-10-PCS | Mod: PBBFAC,,, | Performed by: INTERNAL MEDICINE

## 2020-07-02 PROCEDURE — 3078F PR MOST RECENT DIASTOLIC BLOOD PRESSURE < 80 MM HG: ICD-10-PCS | Mod: CPTII,S$GLB,, | Performed by: INTERNAL MEDICINE

## 2020-07-02 PROCEDURE — 3075F SYST BP GE 130 - 139MM HG: CPT | Mod: CPTII,S$GLB,, | Performed by: INTERNAL MEDICINE

## 2020-07-02 PROCEDURE — 3052F HG A1C>EQUAL 8.0%<EQUAL 9.0%: CPT | Mod: CPTII,S$GLB,, | Performed by: INTERNAL MEDICINE

## 2020-07-02 PROCEDURE — 99999 PR PBB SHADOW E&M-EST. PATIENT-LVL IV: CPT | Mod: PBBFAC,,, | Performed by: INTERNAL MEDICINE

## 2020-07-02 RX ORDER — LOSARTAN POTASSIUM 100 MG/1
100 TABLET ORAL DAILY
COMMUNITY
Start: 2020-06-10 | End: 2020-12-22 | Stop reason: SDUPTHER

## 2020-07-02 RX ORDER — HYDROCHLOROTHIAZIDE 25 MG/1
25 TABLET ORAL DAILY
COMMUNITY
Start: 2020-06-10 | End: 2020-12-22 | Stop reason: SDUPTHER

## 2020-07-02 RX ORDER — METFORMIN HYDROCHLORIDE 500 MG/1
500 TABLET ORAL
Qty: 30 TABLET | Refills: 6 | Status: SHIPPED | OUTPATIENT
Start: 2020-07-02 | End: 2020-09-23

## 2020-07-02 RX ORDER — AMLODIPINE BESYLATE 10 MG/1
TABLET ORAL
COMMUNITY
End: 2020-09-23 | Stop reason: SDUPTHER

## 2020-07-02 NOTE — PROGRESS NOTES
Subjective:      Patient ID: Sj Gonzalez is a 65 y.o. male.    Chief Complaint: Diabetes mellitus type 2     History of Present Illness  65-year-old male with hypertension referred here for evaluation of Diabetes mellitus type 2     Diagnosed with diabetes>5 years ago    Current diabetes medications include:    Glipizide 10 mg and  basaglar 25   DidNT tolerate metformin due to gi issues    (during the last visit with primary care, he was told to stop glipizide and start on metformin however patient did not make the change).     Not checking sugars, waiting for test strips      Hypoglycemia: nerver had lows in the past    Diabetes Education in the past - No  (offered diabetes education referral, patient refused)        Meals:       Breakfast- eggs and toast+ coffee    Lunch-  Skips/fried food/hamburger    Dinner- Home cooked meals       Snacks- minimal    Drinks- water, diet iced  tea     Activity- active loading and unloading      Weight has  Been stable     Any hospitalizations r/t to diabetes over last year: No    Diabetes Management Status    Statin: Not taking  ACE/ARB: Taking    Screening or Prevention Patient's value Goal Complete/Controlled?   HgA1C Testing and Control   Lab Results   Component Value Date    HGBA1C 8.8 (H) 06/20/2020      Annually/Less than 8% No   Lipid profile : 06/20/2020 Annually Yes   LDL control Lab Results   Component Value Date    LDLCALC 79.4 06/20/2020    Annually/Less than 100 mg/dl  Yes   Nephropathy screening Lab Results   Component Value Date    LABMICR <2.5 10/08/2019     Lab Results   Component Value Date    PROTEINUA Negative 05/24/2018    Annually Yes   Blood pressure BP Readings from Last 1 Encounters:   07/02/20 139/71    Less than 140/90 Yes   Dilated retinal exam : 06/24/2020 Annually Yes   Foot exam   : 04/23/2019 Annually No         Chronic Complications:          Microvascular -  patient has upcoming eye exam                               no proteinuria.  No  numbness and tingling in feet    Macrovascular -no coronary artery disease or stroke      Autonomic Dysfunction - denies gastroparesis, history of incontinence at night       Denies foot ulcers, deformities.  Performs foot care at home.     Steroids recently - no    Review of Systems    Review of 7 systems negative except as documented above      Objective:     /71   Pulse 88   Temp 98.4 °F (36.9 °C)   Ht 6' (1.829 m)   Wt 113.4 kg (250 lb)   BMI 33.91 kg/m²   BP Readings from Last 3 Encounters:   07/02/20 139/71   06/24/20 130/82   12/09/19 (!) 140/96     Wt Readings from Last 1 Encounters:   07/02/20 1509 113.4 kg (250 lb)     Body mass index is 33.91 kg/m².      Physical Exam  Vitals signs reviewed.   Constitutional:       Appearance: Normal appearance. He is well-developed.   HENT:      Head: Normocephalic and atraumatic.   Neck:      Thyroid: No thyromegaly.   Cardiovascular:      Rate and Rhythm: Normal rate.   Pulmonary:      Effort: Pulmonary effort is normal.      Breath sounds: Normal breath sounds.   Abdominal:      Palpations: Abdomen is soft.   Neurological:      Mental Status: He is alert and oriented to person, place, and time.   Psychiatric:         Judgment: Judgment normal.                Lab Review:   Lab Results   Component Value Date    HGBA1C 8.8 (H) 06/20/2020     Lab Results   Component Value Date    CHOL 135 06/20/2020    HDL 44 06/20/2020    LDLCALC 79.4 06/20/2020    TRIG 58 06/20/2020    CHOLHDL 32.6 06/20/2020     Lab Results   Component Value Date     06/20/2020    K 5.0 06/20/2020     06/20/2020    CO2 30 (H) 06/20/2020     (H) 06/20/2020    BUN 17 06/20/2020    CREATININE 0.75 06/20/2020    CALCIUM 9.4 06/20/2020    PROT 7.4 06/20/2020    ALBUMIN 4.4 06/20/2020    BILITOT 0.8 06/20/2020    ALKPHOS 58 06/20/2020    AST 51 (H) 06/20/2020    ALT 56 (H) 06/20/2020    ANIONGAP 6 (L) 06/20/2020    ESTGFRAFRICA >60.0 06/20/2020    EGFRNONAA >60.0 06/20/2020     TSH 2.660 05/24/2018     No results found for: [id number]    Assessment and Plan     Type 2 diabetes mellitus with hyperglycemia  65-year-old male with history of long-standing type 2 diabetes complicated with hyperglycemia here for further evaluation.  Most recent A1c elevated at 8.8.  Goal A1c is around 7 with no low sugars    Had  In depth discussion regarding diabetes disease process with emphasis on risks of  hyperglycemia.  Also discussed in detail about the diabetic dietary lifestyle-with emphasis on low glycemic foods and portion control.      No meter or sugar logs available(not checking his sugars, patient is waiting for his test strips and is expecting them soon)    For now will continue on glipizide 10 mg once daily and basaglar 25 units in the morning  Will start on low-dose metformin 500 mg once daily  Patient is aware to  take glipizide right before he starts food    Will schedule follow-up in 2 weeks and will review sugar logs and will make necessary changes     Hypertension -well controlled on current regimen.  Hyperlipidemia - LDL at goal.  Diet-controlled   Patient to schedule follow-up with eye doctor soon  No proteinuria/Normal renal function.  Advised foot care.  (patient does have multiple minor skin breakouts-offered Dermatology referral.  Patient refused)     Obesity - benefits of weight loss with emphasis on lowering insulin resistance reviewed, recommend following meal plan and increasing activity     Follow-up visit in 2 weeks

## 2020-07-02 NOTE — LETTER
July 2, 2020      Lori Bear MD  [id number] [facility] [id number]           [place] - Endocrinology  [address] [place] [address]  Phone: [phone]  Fax: [phone]          Patient: Sj Gonzalez   MR Number: [id number]   YOB: 1955   Date of Visit: 7/2/2020       Dear Dr. Lori Bear:    Thank you for referring Sj Gonzalez to me for evaluation. Attached you will find relevant portions of my assessment and plan of care.    If you have questions, please do not hesitate to call me. I look forward to following Sj Gonzalez along with you.    Sincerely,    Nathaniel Mazariegos MD    Enclosure  CC:  No Recipients    If you would like to receive this communication electronically, please contact [email or web address] or [phone] to request more information on [facility] access.    For providers and/or their staff who would like to refer a patient to [facility], please contact us through our one-stop-shop provider referral line, [facility], at [phone].    If you feel you have received this communication in error or would no longer like to receive these types of communications, please e-mail [email or web address]

## 2020-07-02 NOTE — ASSESSMENT & PLAN NOTE
65-year-old male with history of long-standing type 2 diabetes complicated with hyperglycemia here for further evaluation.  Most recent A1c elevated at 8.8.  Goal A1c is around 7 with no low sugars    Had  In depth discussion regarding diabetes disease process with emphasis on risks of  hyperglycemia.  Also discussed in detail about the diabetic dietary lifestyle-with emphasis on low glycemic foods and portion control.      No meter or sugar logs available(not checking his sugars, patient is waiting for his test strips and is expecting them soon)    For now will continue on glipizide 10 mg once daily and basaglar 25 units in the morning  Will start on low-dose metformin 500 mg once daily  Patient is aware to  take glipizide right before he starts food    Will schedule follow-up in 2 weeks and will review sugar logs and will make necessary changes     Hypertension -well controlled on current regimen.  Hyperlipidemia - LDL at goal.  Diet-controlled   Patient to schedule follow-up with eye doctor soon  No proteinuria/Normal renal function.  Advised foot care.  (patient does have multiple minor skin breakouts-offered Dermatology referral.  Patient refused)     Obesity - benefits of weight loss with emphasis on lowering insulin resistance reviewed, recommend following meal plan and increasing activity     Follow-up visit in 2 weeks

## 2020-07-07 ENCOUNTER — PATIENT OUTREACH (OUTPATIENT)
Dept: ADMINISTRATIVE | Facility: OTHER | Age: 65
End: 2020-07-07

## 2020-07-07 NOTE — PROGRESS NOTES
Requested updates within Care Everywhere.  Patient's chart was reviewed for overdue HILARIO topics.  Immunizations reconciled.    Orders placed:  Tasked appts:  Labs Linked:

## 2020-07-27 ENCOUNTER — TELEPHONE (OUTPATIENT)
Dept: ENDOCRINOLOGY | Facility: CLINIC | Age: 65
End: 2020-07-27

## 2020-07-27 NOTE — TELEPHONE ENCOUNTER
----- Message from Dora Mccloud sent at 7/27/2020 11:23 AM CDT -----  Type:  Needs Medical Advice    Who Called: wife  Reason:needs to inquire about his medication  Would the patient rather a call back or a response via [facility]?callback  Best Call Back Number: [phone]  Additional Information: none

## 2020-07-27 NOTE — TELEPHONE ENCOUNTER
Dora Armstrong Staff   Caller: Unspecified (Today, 11:23 AM)             Type:  Needs Medical Advice     Who Called: wife   Reason:needs to inquire about his medication   Would the patient rather a call back or a response via [facility]?callback   Best Call Back Number: [phone]   Additional Information: none      I spoke to Mr Gustafson and he asked me to call his wife and speak with her. Mrs Bonner stated that her  can not tolerate the Metformin it bothers his stomach. Ms Bonner would like for her  to placed on a different medication. Message sent to Dr Mazariegos.

## 2020-07-28 ENCOUNTER — TELEPHONE (OUTPATIENT)
Dept: ENDOCRINOLOGY | Facility: CLINIC | Age: 65
End: 2020-07-28

## 2020-07-28 DIAGNOSIS — I10 ESSENTIAL HYPERTENSION: ICD-10-CM

## 2020-07-28 DIAGNOSIS — E11.65 TYPE 2 DIABETES MELLITUS WITH HYPERGLYCEMIA, WITH LONG-TERM CURRENT USE OF INSULIN: ICD-10-CM

## 2020-07-28 DIAGNOSIS — Z79.4 TYPE 2 DIABETES MELLITUS WITH HYPERGLYCEMIA, WITH LONG-TERM CURRENT USE OF INSULIN: ICD-10-CM

## 2020-07-28 RX ORDER — AMLODIPINE BESYLATE 10 MG/1
10 TABLET ORAL DAILY
Qty: 90 TABLET | Refills: 0 | Status: SHIPPED | OUTPATIENT
Start: 2020-07-28 | End: 2020-10-21 | Stop reason: SDUPTHER

## 2020-07-28 RX ORDER — GLIPIZIDE 10 MG/1
10 TABLET, FILM COATED, EXTENDED RELEASE ORAL DAILY
Qty: 30 TABLET | Refills: 6 | Status: SHIPPED | OUTPATIENT
Start: 2020-07-28 | End: 2021-02-23 | Stop reason: SDUPTHER

## 2020-07-28 NOTE — TELEPHONE ENCOUNTER
Spoke with patient's wife in detail regarding metformin concern- diarrhea.    Will hold off metformin for now.  Patient has discontinued glipizide.  Recommended to resume back.  New prescription sent to pharmacy.    Also recommended to send sugar logs in 2 weeks for review.

## 2020-07-28 NOTE — TELEPHONE ENCOUNTER
----- Message from Randi Bernal sent at 7/28/2020  1:36 PM CDT -----  Contact: Spouse/Kailey [phone]  Prescription Request:     Name of medication: amLODIPine (NORVASC) 10 MG tablet    Reason for request: Refill    Pharmacy: [facility] [id number] [facility] [id number] [address]    Requesting 90 day supply with refills. Stated that the patient is OUT of this Rx.    Thank You

## 2020-07-29 ENCOUNTER — PATIENT OUTREACH (OUTPATIENT)
Dept: ADMINISTRATIVE | Facility: OTHER | Age: 65
End: 2020-07-29

## 2020-07-29 NOTE — PROGRESS NOTES
Chart reviewed.   Immunizations: updated  Orders placed: n/a  Upcoming appts to satisfy HILARIO topics: Optometry 7/30

## 2020-08-04 ENCOUNTER — TELEPHONE (OUTPATIENT)
Dept: ENDOCRINOLOGY | Facility: CLINIC | Age: 65
End: 2020-08-04

## 2020-08-04 NOTE — TELEPHONE ENCOUNTER
----- Message from Nathaniel Mazariegos MD sent at 8/4/2020  3:17 PM CDT -----  Regarding: RE: call back  Contact: [phone]  Please inform him we will discuss about it during next visit. We need to review side effects before prescribing medication  ----- Message -----  From: Evelyne Gill MA  Sent: 8/4/2020   3:05 PM CDT  To: Nathaniel Mazariegos MD  Subject: FW: call back                                    Hi Dr Mazariegos,  I am forwarding you this message to find out if this is something that can be done.  ----- Message -----  From: Adali Austin  Sent: 8/4/2020   1:03 PM CDT  To: Yair Armstrong Staff  Subject: call back                                        Patient is calling to talk to nurse in regards to see if the doctor can call in Trulicity a 1 a week injection. [id number] - [place], [place] - [address] [facility] [phone] (Phone)  [phone] (Fax)

## 2020-08-04 NOTE — TELEPHONE ENCOUNTER
MD Evelyne Goode MA   Caller: [phone] (Today,  1:03 PM)             Please inform him we will discuss about it during next visit. We need to review side effects before prescribing medication    Previous Messages    ----- Message -----   From: Evelyne Gill MA   Sent: 8/4/2020   3:05 PM CDT   To: Nathaniel Mazariegos MD   Subject: FW: call back                                     Hi Dr Mazariegos,   I am forwarding you this message to find out if this is something that can be done.   ----- Message -----   From: Adali Austin   Sent: 8/4/2020   1:03 PM CDT   To: Yair Armstrong Staff   Subject: call back                                         Patient is calling to talk to nurse in regards to see if the doctor can call in Trulicity a 1 a week injection. [id number] - [facility] [place] - [address] [facility] [address] [phone] (Phone)   [phone] (Fax)         The patient would like to speak to Dr Mazariegos. The patient stated that he is not taking the metformin and really needs to get started on Trulicity. I will send Dr Mazariegos a message.

## 2020-08-24 ENCOUNTER — TELEPHONE (OUTPATIENT)
Dept: GASTROENTEROLOGY | Facility: CLINIC | Age: 65
End: 2020-08-24

## 2020-08-24 ENCOUNTER — OFFICE VISIT (OUTPATIENT)
Dept: GASTROENTEROLOGY | Facility: CLINIC | Age: 65
End: 2020-08-24
Payer: COMMERCIAL

## 2020-08-24 ENCOUNTER — OFFICE VISIT (OUTPATIENT)
Dept: OPTOMETRY | Facility: CLINIC | Age: 65
End: 2020-08-24
Payer: COMMERCIAL

## 2020-08-24 VITALS
BODY MASS INDEX: 32.95 KG/M2 | SYSTOLIC BLOOD PRESSURE: 138 MMHG | HEIGHT: 72 IN | WEIGHT: 243.31 LBS | DIASTOLIC BLOOD PRESSURE: 80 MMHG

## 2020-08-24 DIAGNOSIS — H25.13 NS (NUCLEAR SCLEROSIS), BILATERAL: ICD-10-CM

## 2020-08-24 DIAGNOSIS — I10 ESSENTIAL HYPERTENSION: ICD-10-CM

## 2020-08-24 DIAGNOSIS — E11.9 TYPE 2 DIABETES MELLITUS WITHOUT OPHTHALMIC MANIFESTATIONS: ICD-10-CM

## 2020-08-24 DIAGNOSIS — H52.4 PRESBYOPIA: ICD-10-CM

## 2020-08-24 DIAGNOSIS — Z79.4 TYPE 2 DIABETES MELLITUS WITH HYPERGLYCEMIA, WITH LONG-TERM CURRENT USE OF INSULIN: Primary | ICD-10-CM

## 2020-08-24 DIAGNOSIS — R19.7 DIARRHEA, UNSPECIFIED TYPE: ICD-10-CM

## 2020-08-24 DIAGNOSIS — R10.84 GENERALIZED ABDOMINAL PAIN: Primary | ICD-10-CM

## 2020-08-24 DIAGNOSIS — E11.65 TYPE 2 DIABETES MELLITUS WITH HYPERGLYCEMIA, WITH LONG-TERM CURRENT USE OF INSULIN: Primary | ICD-10-CM

## 2020-08-24 DIAGNOSIS — H52.03 HYPEROPIA, BILATERAL: ICD-10-CM

## 2020-08-24 DIAGNOSIS — E11.9 TYPE 2 DIABETES MELLITUS WITHOUT COMPLICATION, WITHOUT LONG-TERM CURRENT USE OF INSULIN: ICD-10-CM

## 2020-08-24 PROCEDURE — 3079F PR MOST RECENT DIASTOLIC BLOOD PRESSURE 80-89 MM HG: ICD-10-PCS | Mod: CPTII,S$GLB,, | Performed by: NURSE PRACTITIONER

## 2020-08-24 PROCEDURE — 99203 PR OFFICE/OUTPT VISIT, NEW, LEVL III, 30-44 MIN: ICD-10-PCS | Mod: S$GLB,,, | Performed by: NURSE PRACTITIONER

## 2020-08-24 PROCEDURE — 99999 PR PBB SHADOW E&M-EST. PATIENT-LVL V: ICD-10-PCS | Mod: PBBFAC,,, | Performed by: NURSE PRACTITIONER

## 2020-08-24 PROCEDURE — 99203 OFFICE O/P NEW LOW 30 MIN: CPT | Mod: S$GLB,,, | Performed by: NURSE PRACTITIONER

## 2020-08-24 PROCEDURE — 99999 PR PBB SHADOW E&M-EST. PATIENT-LVL V: CPT | Mod: PBBFAC,,, | Performed by: NURSE PRACTITIONER

## 2020-08-24 PROCEDURE — 99999 PR PBB SHADOW E&M-EST. PATIENT-LVL III: CPT | Mod: PBBFAC,,, | Performed by: OPTOMETRIST

## 2020-08-24 PROCEDURE — 92015 PR REFRACTION: ICD-10-PCS | Mod: S$GLB,,, | Performed by: OPTOMETRIST

## 2020-08-24 PROCEDURE — 3079F DIAST BP 80-89 MM HG: CPT | Mod: CPTII,S$GLB,, | Performed by: NURSE PRACTITIONER

## 2020-08-24 PROCEDURE — 3008F PR BODY MASS INDEX (BMI) DOCUMENTED: ICD-10-PCS | Mod: CPTII,S$GLB,, | Performed by: NURSE PRACTITIONER

## 2020-08-24 PROCEDURE — 3008F BODY MASS INDEX DOCD: CPT | Mod: CPTII,S$GLB,, | Performed by: NURSE PRACTITIONER

## 2020-08-24 PROCEDURE — 92004 COMPRE OPH EXAM NEW PT 1/>: CPT | Mod: S$GLB,,, | Performed by: OPTOMETRIST

## 2020-08-24 PROCEDURE — 92015 DETERMINE REFRACTIVE STATE: CPT | Mod: S$GLB,,, | Performed by: OPTOMETRIST

## 2020-08-24 PROCEDURE — 3075F PR MOST RECENT SYSTOLIC BLOOD PRESS GE 130-139MM HG: ICD-10-PCS | Mod: CPTII,S$GLB,, | Performed by: NURSE PRACTITIONER

## 2020-08-24 PROCEDURE — 3075F SYST BP GE 130 - 139MM HG: CPT | Mod: CPTII,S$GLB,, | Performed by: NURSE PRACTITIONER

## 2020-08-24 PROCEDURE — 99999 PR PBB SHADOW E&M-EST. PATIENT-LVL III: ICD-10-PCS | Mod: PBBFAC,,, | Performed by: OPTOMETRIST

## 2020-08-24 PROCEDURE — 92004 PR EYE EXAM, NEW PATIENT,COMPREHESV: ICD-10-PCS | Mod: S$GLB,,, | Performed by: OPTOMETRIST

## 2020-08-24 RX ORDER — DICYCLOMINE HYDROCHLORIDE 20 MG/1
20 TABLET ORAL 3 TIMES DAILY PRN
Qty: 60 TABLET | Refills: 2 | Status: SHIPPED | OUTPATIENT
Start: 2020-08-24 | End: 2020-12-30

## 2020-08-24 NOTE — PATIENT INSTRUCTIONS

## 2020-08-24 NOTE — PROGRESS NOTES
HPI     Pt here for diabetic eye exam   Diagnosed 2 yrs ago   Wears +2.50 readers  Patient denies diplopia, headaches, flashes/floaters, pain, and   itching/burning/tearing.    .Uses eye gtts prn    Last edited by Dora Colon on 8/24/2020 11:52 AM. (History)            Assessment /Plan     For exam results, see Encounter Report.    Type 2 diabetes mellitus with hyperglycemia, with long-term current use of insulin  Type 2 diabetes mellitus without ophthalmic manifestations  Essential hypertension   No retinopathy, monitor yearly   Discussed importance of A1c control    NS (nuclear sclerosis), bilateral   Mild, monitor    Presbyopia  Hyperopia, bilateral   OK to continue with OTC readers     RTC 1 year, sooner PRN

## 2020-08-24 NOTE — PROGRESS NOTES
Subjective:       Patient ID: Sj Gonzalez is a 65 y.o. male.    Chief Complaint: Diarrhea and Abdominal Pain    66 y/o male hypertension and type 2 diabetes presents to clinic with c/o abdominal pain and diarrhea for 3-4 weeks. Patient states symptoms started when he resumed metformin. States he was restarted on metformin by PCP and endocrinologist although he reported GI problems with previous use. Reports generalized abdominal cramping, distention, and diarrhea. Has multiple episodes of small, loose-watery stools daily for past few weeks. He has tried Milk of Magnesia without any symptom improvement. Reports some weight loss as he is afraid to eat. States symptoms are worse with food. Denies n/v, fever, fatigue, blood in stool, or melena. No recent ill contacts or antibiotic use. No personal or FH of colon polyps or cancer. Patient has never completed colonoscopy.       Past Medical History:   Diagnosis Date    Heart murmur     MVP    Hypertension     Urinary tract infection        Past Surgical History:   Procedure Laterality Date    ARTHROSCOPY OF KNEE Right     SPINE SURGERY      Lumbar       Family History   Problem Relation Age of Onset    No Known Problems Mother     No Known Problems Father     Diabetes Sister     Diabetes Brother     No Known Problems Daughter     Cancer Neg Hx     Heart disease Neg Hx     Prostate cancer Neg Hx     Kidney disease Neg Hx        Social History     Socioeconomic History    Marital status:      Spouse name: Not on file    Number of children: Not on file    Years of education: Not on file    Highest education level: Not on file   Occupational History    Not on file   Social Needs    Financial resource strain: Not hard at all    Food insecurity     Worry: Never true     Inability: Never true    Transportation needs     Medical: No     Non-medical: No   Tobacco Use    Smoking status: Never Smoker    Smokeless tobacco: Never Used   Substance and  Sexual Activity    Alcohol use: Yes     Alcohol/week: 0.0 standard drinks     Comment: daily    Drug use: No    Sexual activity: Yes     Partners: Female   Lifestyle    Physical activity     Days per week: 7 days     Minutes per session: 150+ min    Stress: Not at all   Relationships    Social connections     Talks on phone: More than three times a week     Gets together: More than three times a week     Attends Worship service: Not on file     Active member of club or organization: Yes     Attends meetings of clubs or organizations: More than 4 times per year     Relationship status:    Other Topics Concern    Not on file   Social History Narrative    He lives in [facility]. He lives with his wife and 2 children. His children are attending [employer]. He lived [place]. . Nonsmoker. Social drinker. 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 His son attended [employer].        Review of Systems   Constitutional: Positive for unexpected weight change. Negative for fatigue and fever.   HENT: Negative for sore throat and trouble swallowing.    Respiratory: Negative for cough and shortness of breath.    Cardiovascular: Negative for chest pain.   Gastrointestinal: Positive for abdominal pain and diarrhea.   Genitourinary: Negative for dysuria.   Musculoskeletal: Negative for myalgias.   Allergic/Immunologic: Negative for environmental allergies and immunocompromised state.   Neurological: Negative for dizziness and light-headedness.   Hematological: Negative for adenopathy. Does not bruise/bleed easily.   Psychiatric/Behavioral: Negative for dysphoric mood.         Objective:     Vitals:    08/24/20 0953   BP: 138/80   BP Location: Left arm   Patient Position: Sitting   BP Method: Large (Manual)   Weight: 110.4 kg (243 lb 4.8 oz)   Height: 6' (1.829 m)          Physical Exam  Constitutional:       General: He is not in acute distress.     Appearance: Normal appearance. He is well-developed. He is not  ill-appearing.   HENT:      Head: Normocephalic.   Eyes:      Conjunctiva/sclera: Conjunctivae normal.      Pupils: Pupils are equal, round, and reactive to light.   Neck:      Musculoskeletal: Normal range of motion and neck supple.   Cardiovascular:      Rate and Rhythm: Normal rate and regular rhythm.   Pulmonary:      Effort: Pulmonary effort is normal.      Breath sounds: Normal breath sounds.   Abdominal:      General: Bowel sounds are normal. There is distension.      Palpations: Abdomen is soft.      Tenderness: There is generalized abdominal tenderness.   Musculoskeletal: Normal range of motion.   Skin:     General: Skin is warm and dry.   Neurological:      Mental Status: He is alert and oriented to person, place, and time.   Psychiatric:         Mood and Affect: Mood normal.         Behavior: Behavior normal.       Lab Results   Component Value Date    WBC 8.74 10/08/2019    HGB 15.2 10/08/2019    HCT 43.2 10/08/2019    MCV 94 10/08/2019     10/08/2019     BMP  Lab Results   Component Value Date     06/20/2020    K 5.0 06/20/2020     06/20/2020    CO2 30 (H) 06/20/2020    BUN 17 06/20/2020    CREATININE 0.75 06/20/2020    CALCIUM 9.4 06/20/2020    ANIONGAP 6 (L) 06/20/2020    ESTGFRAFRICA >60.0 06/20/2020    EGFRNONAA >60.0 06/20/2020           Assessment:         ICD-10-CM ICD-9-CM   1. Generalized abdominal pain  R10.84 789.07   2. Diarrhea, unspecified type  R19.7 787.91       Plan:       Generalized abdominal pain  -     dicyclomine (BENTYL) 20 mg tablet; Take 1 tablet (20 mg total) by mouth 3 (three) times daily as needed.  Dispense: 60 tablet; Refill: 2  -     X-Ray Abdomen Flat And Erect; Future; Expected date: 08/24/2020  -      Abdomen Complete; Future; Expected date: 08/24/2020    Diarrhea, unspecified type    Will plan for screening colonoscopy once acute illness resolved.  Follow up if symptoms worsen or fail to improve.     Patient's Medications   New Prescriptions     "DICYCLOMINE (BENTYL) 20 MG TABLET    Take 1 tablet (20 mg total) by mouth 3 (three) times daily as needed.   Previous Medications    AMLODIPINE (NORVASC) 10 MG TABLET        AMLODIPINE (NORVASC) 10 MG TABLET    Take 1 tablet (10 mg total) by mouth once daily.    BLOOD SUGAR DIAGNOSTIC STRP    1 strip by Misc.(Non-Drug; Combo Route) route 3 (three) times daily before meals.    CLOTRIMAZOLE-BETAMETHASONE 1-0.05% (LOTRISONE) CREAM    Apply topically 2 (two) times daily.    GLIPIZIDE (GLUCOTROL) 10 MG TR24    Take 1 tablet (10 mg total) by mouth once daily.    HYDROCHLOROTHIAZIDE (HYDRODIURIL) 25 MG TABLET        INSULIN (BASAGLAR KWIKPEN U-100 INSULIN) GLARGINE 100 UNITS/ML (3ML) SUBQ PEN    Inject 25 units into the skin every evening    LOSARTAN (COZAAR) 100 MG TABLET        LOSARTAN-HYDROCHLOROTHIAZIDE 100-25 MG (HYZAAR) 100-25 MG PER TABLET    Take 1 tablet by mouth once daily.    METFORMIN (GLUCOPHAGE) 500 MG TABLET    Take 1 tablet (500 mg total) by mouth daily with breakfast.    PEN NEEDLE, DIABETIC 32 GAUGE X 5/32" NDLE    Use nightly as directed with insulin pen.    TESTOSTERONE CYPIONATE (DEPOTESTOTERONE CYPIONATE) 200 MG/ML INJECTION    Inject 1 mL (200 mg total) into the muscle every 14 (fourteen) days.   Modified Medications    No medications on file   Discontinued Medications    No medications on file           "

## 2020-08-24 NOTE — TELEPHONE ENCOUNTER
----- Message from Nette Busby sent at 8/24/2020 11:02 AM CDT -----  Type:  Needs Medical Advice    Who Called:  Shivani (wife)  Symptoms (please be specific):  pt couldn't remember what over the counter medication Dr. Rose referred him to after his visit today (8/24/20)   How long has patient had these symptoms:   unknown  Pharmacy name and phone #:   n/a  Would the patient rather a call back or a response via [facility]?  Call back  Best Call Back Number:  [phone] or [phone]  Additional Information: pt couldn't remember what over the counter medication Dr. Rose referred him to after his visit today (8/24/20), need more info about it

## 2020-08-24 NOTE — TELEPHONE ENCOUNTER
Told patient that he needs to get 2 bottles of magnesium citrate. Drink 1 bottle and if bowel movement is still brown in color drink another bottle 2-3 hours later. Patient understood.

## 2020-08-26 ENCOUNTER — PATIENT OUTREACH (OUTPATIENT)
Dept: ADMINISTRATIVE | Facility: OTHER | Age: 65
End: 2020-08-26

## 2020-08-27 ENCOUNTER — TELEPHONE (OUTPATIENT)
Dept: GASTROENTEROLOGY | Facility: CLINIC | Age: 65
End: 2020-08-27

## 2020-08-27 DIAGNOSIS — Z12.11 SCREENING FOR COLON CANCER: ICD-10-CM

## 2020-08-27 DIAGNOSIS — Z12.11 SCREENING FOR COLON CANCER: Primary | ICD-10-CM

## 2020-08-27 DIAGNOSIS — Z01.818 PREOPERATIVE EXAMINATION: ICD-10-CM

## 2020-08-27 RX ORDER — SODIUM, POTASSIUM,MAG SULFATES 17.5-3.13G
1 SOLUTION, RECONSTITUTED, ORAL ORAL DAILY
Qty: 354 ML | Refills: 0 | OUTPATIENT
Start: 2020-08-27 | End: 2020-08-29

## 2020-08-27 RX ORDER — SODIUM, POTASSIUM,MAG SULFATES 17.5-3.13G
1 SOLUTION, RECONSTITUTED, ORAL ORAL DAILY
Qty: 1 KIT | Refills: 0 | Status: SHIPPED | OUTPATIENT
Start: 2020-08-27 | End: 2020-08-29

## 2020-08-27 RX ORDER — POLYETHYLENE GLYCOL 3350, SODIUM SULFATE ANHYDROUS, SODIUM BICARBONATE, SODIUM CHLORIDE, POTASSIUM CHLORIDE 236; 22.74; 6.74; 5.86; 2.97 G/4L; G/4L; G/4L; G/4L; G/4L
POWDER, FOR SOLUTION ORAL
Qty: 1 BOTTLE | Refills: 0 | Status: ON HOLD | OUTPATIENT
Start: 2020-08-27 | End: 2020-09-25 | Stop reason: HOSPADM

## 2020-08-27 NOTE — TELEPHONE ENCOUNTER
8/27/2020 spoke with patient informed him no acute finding on ultrasound. Patient does want to have colonoscopy done. Someone will get back with patient to set up colonoscopy date and time. Vf/ma

## 2020-08-28 ENCOUNTER — TELEPHONE (OUTPATIENT)
Dept: GASTROENTEROLOGY | Facility: CLINIC | Age: 65
End: 2020-08-28

## 2020-08-28 NOTE — TELEPHONE ENCOUNTER
Prep sent to [facility] in [place]            GOLYTELY/ COLYTE/ NULYTELY Instructions    You are scheduled for a colonoscopy with Dr. Hodges on 9/25/20 at [facility].  To ensure that your test is accurate and complete, you MUST follow these instructions listed below.  If you have any questions, please call our office at [phone].  Plan on being at the hospital for your procedure for 3-4 hours.    1.  Follow a CLEAR LIQUID DIET for the entire day before your scheduled colonoscopy.  This means no solid food the entire day starting when you wake.  You may have as much of the clear liquids as you want throughout the day.   CLEAR LIQUID DIET:   - Avoid Red, Orange, Purple, and/or Blue food coloring   - NO DAIRY   - You can have:  Coffee with sugar (no creamer), tea, water, soda, apple or white grape juice, chicken or beef broth/bouillon (no meat, noodles, or veggies), green/yellow popsicles, green/yellow Jell-O, lemonade.    2.  MIX GOLYTELY/COLYTE/NULYTELY (all names for same product) WITH ONE (1) GALLON OF WATER.  YOU MAY ADD A FLAVOR PACKET OR YELLOW/GREEN POWDER DRINK MIX TO THIS.  PUT IN REFRIGERATOR.  This is easier to drink if this solution is cold, so you can mix the solution one day ahead of time and place in the refrigerator prior to drinking.  You have to drink the solution within 24 hours of mixing it.  Do NOT put this solution over ice.  It IS ok to drink with a straw.    3. AT 5 PM THE DAY BEFORE YOUR COLONOSCOPY, DRINK ONE (1) 8 OUNCE GLASS OF MIXTURE EVERY 10 MINUTES UNTIL HALF OF THE GALLON IS CONSUMED.  Keep this mixture cold and in refrigerator as much as you can while drinking it.  Place the remaining half of mixture in the refrigerator when you finish the first half.    4.  The endoscopy department will call you 1 day before your colonoscopy to tell you the exact time to arrive, AND to tell you the exact time to drink the 2nd portion of your prep (which will be FIVE HOURS BEFORE  YOUR ARRIVAL TIME).  At this time given to you, DRINK ONE (1) 8 OUNCE GLASS OF MIXTURE EVERY 10 MINUTES UNTIL THE OTHER HALF IS CONSUMED. Keep the mixture cold while you are drinking it. Once this is complete, you may not have ANYTHING else by mouth!      5.  It is ok to take MOST of your REGULAR MEDICATIONS  in the morning of your test with a SIP of water.  THE ONLY MEDS YOU NEED TO HOLD ARE YOUR DIABETES MEDICATIONS,  SOME BLOOD PRESSURE MEDS, AND BLOOD THINNERS IF OK'D BY YOUR DOCTOR.  Do NOT have anything else to eat or drink the morning of your colonoscopy.  It is ok to brush your teeth.    6.  If you are on blood thinners THAT YOU HAVE BEEN INSTRUCTED TO HOLD BY YOUR DOCTOR FOR THIS PROCEDURE, then do NOT take this the morning of your colonoscopy.  Do NOT stop these medications on your own, they must be approved to be held by your doctor.  Your colonoscopy can NOT be done if you are on these medications.  Examples of blood thinners include: Coumadin, Aggrenox, Plavix, Pradaxa, Reapro, Pletal, Xarelto, Ticagrelor, Brilinta, Eliquis, and high dose aspirin (325 mg).  You do not have to stop baby aspirin 81 mg.    7.  IF YOU ARE DIABETIC:  NO INSULIN OR ORAL MEDICATIONS THE MORNING OF THE COLONOSCOPY.  TAKE ONLY HALF THE DOSE OF YOUR INSULIN THE DAY BEFORE THE COLONOSCOPY.  DO NOT TAKE ANY ORAL DIABETIC MEDICATIONS THE DAY BEFORE THE COLONOSCOPY.  IF YOU ARE AN INSULIN DEPENDENT DIABETIC WITH UNSTABLE BLOOD SUGARS, NOTIFY YOUR PRIMARY CARE PHYSICIAN FOR INSTRUCTIONS.    8.  Please DO use your inhalers the morning of your procedure.

## 2020-08-31 ENCOUNTER — TELEPHONE (OUTPATIENT)
Dept: GASTROENTEROLOGY | Facility: CLINIC | Age: 65
End: 2020-08-31

## 2020-08-31 NOTE — TELEPHONE ENCOUNTER
----- Message from Kirsty Kevin sent at 8/31/2020  2:32 PM CDT -----  Regarding: test  Type:  Needs Medical Advice    Who Called: patient wife  Reason: Patient would like to know if another test can be preformed on patient as the same time as patients colonoscopy.  Would the patient rather a call back or a response via [facility]? Call back  Best Call Back Number: [place] [phone] cell [phone]  Additional Information: N/A

## 2020-08-31 NOTE — TELEPHONE ENCOUNTER
8/31/2020 spoke with patient wife, patient is scheduled for a colonoscopy 9/25/2020. Patient wife  is requesting patient be set up for EGD as well same day of colonoscopy. I spoke with Magdaleno Rose. Patient will need another visit because there is no documentation on him having any upper GI problems. If this is ordered this will push his date to October. She will speak with patient then get back with the office. Vf/ma

## 2020-09-15 ENCOUNTER — PATIENT OUTREACH (OUTPATIENT)
Dept: ADMINISTRATIVE | Facility: HOSPITAL | Age: 65
End: 2020-09-15

## 2020-09-15 NOTE — PROGRESS NOTES
Markos reviewed. Care Everywhere reviewed.   Chart scrubbed for Colon Cancer Screening.  Patient scheduled for Colonoscopy.            KATY

## 2020-09-22 ENCOUNTER — LAB VISIT (OUTPATIENT)
Dept: FAMILY MEDICINE | Facility: CLINIC | Age: 65
End: 2020-09-22
Payer: COMMERCIAL

## 2020-09-22 DIAGNOSIS — Z01.818 PREOPERATIVE EXAMINATION: ICD-10-CM

## 2020-09-22 PROCEDURE — U0003 INFECTIOUS AGENT DETECTION BY NUCLEIC ACID (DNA OR RNA); SEVERE ACUTE RESPIRATORY SYNDROME CORONAVIRUS 2 (SARS-COV-2) (CORONAVIRUS DISEASE [COVID-19]), AMPLIFIED PROBE TECHNIQUE, MAKING USE OF HIGH THROUGHPUT TECHNOLOGIES AS DESCRIBED BY CMS-2020-01-R: HCPCS

## 2020-09-23 LAB — SARS-COV-2 RNA RESP QL NAA+PROBE: NOT DETECTED

## 2020-09-25 PROBLEM — Z12.11 SCREEN FOR COLON CANCER: Status: ACTIVE | Noted: 2020-09-25

## 2020-10-16 DIAGNOSIS — E11.9 TYPE 2 DIABETES MELLITUS WITHOUT COMPLICATION: ICD-10-CM

## 2020-10-21 DIAGNOSIS — I10 ESSENTIAL HYPERTENSION: ICD-10-CM

## 2020-10-21 RX ORDER — AMLODIPINE BESYLATE 10 MG/1
10 TABLET ORAL DAILY
Qty: 90 TABLET | Refills: 0 | Status: SHIPPED | OUTPATIENT
Start: 2020-10-21 | End: 2021-01-23 | Stop reason: SDUPTHER

## 2020-11-04 ENCOUNTER — OFFICE VISIT (OUTPATIENT)
Dept: SPORTS MEDICINE | Facility: CLINIC | Age: 65
End: 2020-11-04
Payer: COMMERCIAL

## 2020-11-04 ENCOUNTER — HOSPITAL ENCOUNTER (OUTPATIENT)
Dept: RADIOLOGY | Facility: HOSPITAL | Age: 65
Discharge: HOME | End: 2020-11-04
Attending: PHYSICIAN ASSISTANT
Payer: COMMERCIAL

## 2020-11-04 VITALS
BODY MASS INDEX: 32.87 KG/M2 | DIASTOLIC BLOOD PRESSURE: 77 MMHG | SYSTOLIC BLOOD PRESSURE: 129 MMHG | HEART RATE: 85 BPM | HEIGHT: 73 IN | WEIGHT: 248 LBS

## 2020-11-04 DIAGNOSIS — M25.561 PAIN IN BOTH KNEES, UNSPECIFIED CHRONICITY: ICD-10-CM

## 2020-11-04 DIAGNOSIS — M25.562 PAIN IN BOTH KNEES, UNSPECIFIED CHRONICITY: ICD-10-CM

## 2020-11-04 DIAGNOSIS — M17.12 PRIMARY OSTEOARTHRITIS OF LEFT KNEE: Primary | ICD-10-CM

## 2020-11-04 PROCEDURE — 3074F SYST BP LT 130 MM HG: CPT | Mod: CPTII,S$GLB,, | Performed by: PHYSICIAN ASSISTANT

## 2020-11-04 PROCEDURE — 3008F BODY MASS INDEX DOCD: CPT | Mod: CPTII,S$GLB,, | Performed by: PHYSICIAN ASSISTANT

## 2020-11-04 PROCEDURE — 3074F PR MOST RECENT SYSTOLIC BLOOD PRESSURE < 130 MM HG: ICD-10-PCS | Mod: CPTII,S$GLB,, | Performed by: PHYSICIAN ASSISTANT

## 2020-11-04 PROCEDURE — 73564 X-RAY EXAM KNEE 4 OR MORE: CPT | Mod: 26,LT,, | Performed by: RADIOLOGY

## 2020-11-04 PROCEDURE — 99999 PR PBB SHADOW E&M-EST. PATIENT-LVL III: ICD-10-PCS | Mod: PBBFAC,,, | Performed by: PHYSICIAN ASSISTANT

## 2020-11-04 PROCEDURE — 99999 PR PBB SHADOW E&M-EST. PATIENT-LVL III: CPT | Mod: PBBFAC,,, | Performed by: PHYSICIAN ASSISTANT

## 2020-11-04 PROCEDURE — 73564 PR  X-RAY KNEE 4+ VIEW: ICD-10-PCS | Mod: 26,LT,, | Performed by: RADIOLOGY

## 2020-11-04 PROCEDURE — 3078F PR MOST RECENT DIASTOLIC BLOOD PRESSURE < 80 MM HG: ICD-10-PCS | Mod: CPTII,S$GLB,, | Performed by: PHYSICIAN ASSISTANT

## 2020-11-04 PROCEDURE — 73564 X-RAY EXAM KNEE 4 OR MORE: CPT | Mod: TC,50

## 2020-11-04 PROCEDURE — 99204 OFFICE O/P NEW MOD 45 MIN: CPT | Mod: S$GLB,,, | Performed by: PHYSICIAN ASSISTANT

## 2020-11-04 PROCEDURE — 73564 X-RAY EXAM KNEE 4 OR MORE: CPT | Mod: 26,RT,, | Performed by: RADIOLOGY

## 2020-11-04 PROCEDURE — 3008F PR BODY MASS INDEX (BMI) DOCUMENTED: ICD-10-PCS | Mod: CPTII,S$GLB,, | Performed by: PHYSICIAN ASSISTANT

## 2020-11-04 PROCEDURE — 3078F DIAST BP <80 MM HG: CPT | Mod: CPTII,S$GLB,, | Performed by: PHYSICIAN ASSISTANT

## 2020-11-04 PROCEDURE — 99204 PR OFFICE/OUTPT VISIT, NEW, LEVL IV, 45-59 MIN: ICD-10-PCS | Mod: S$GLB,,, | Performed by: PHYSICIAN ASSISTANT

## 2020-11-04 RX ORDER — NAPROXEN 500 MG/1
500 TABLET ORAL 2 TIMES DAILY
Qty: 60 TABLET | Refills: 0 | Status: SHIPPED | OUTPATIENT
Start: 2020-11-04 | End: 2020-12-04

## 2020-11-04 NOTE — PROGRESS NOTES
CC: LEFT knee pain    65 y.o. Male who presents as a new patient to me. Complaint is left knee pain x 4 days after trying to push a refrigerator once his trolly broke.  He states that he felt pain in the medial and lateral aspect of his knee later on in the day after trying to push the refrigerator.  He describes the pain as sharp, occasionally a pulling sensation with certain movements of his leg.  Pain located over the medial and lateral joint line, tightness in the posterior aspect of his knee.  Denies swelling, denies instability or mechanical symptoms.  Worst with prolonged standing/ambulation, better with rest.  Here today to discuss diagnosis and treatment options.      PMHx notable for T2DM.   Negative for tobacco.   Positive for diabetes. Last A1C: 8.8    Pain Score:   5    REVIEW OF SYSTEMS:   Constitution: Negative. Negative for chills, fever and night sweats.    Hematologic/Lymphatic: Negative for bleeding problem. Does not bruise/bleed easily.   Skin: Negative for dry skin, itching and rash.   Musculoskeletal: Negative for falls. Positive for left knee pain and muscle weakness.     All other review of symptoms were reviewed and found to be noncontributory.     PAST MEDICAL HISTORY:   Past Medical History:   Diagnosis Date    Diabetes mellitus     Heart murmur     MVP    Hypertension     Urinary tract infection        PAST SURGICAL HISTORY:   Past Surgical History:   Procedure Laterality Date    ARTHROSCOPY OF KNEE Right     COLONOSCOPY N/A 9/25/2020    Procedure: COLONOSCOPY;  Surgeon: Vicky Hodges MD;  Location: [facility];  Service: Endoscopy;  Laterality: N/A;    SPINE SURGERY      Lumbar       FAMILY HISTORY:   Family History   Problem Relation Age of Onset    No Known Problems Mother     No Known Problems Father     Diabetes Sister     Diabetes Brother     No Known Problems Daughter     Cancer Neg Hx     Heart disease Neg Hx     Prostate cancer Neg Hx     Kidney disease Neg Hx         SOCIAL HISTORY:   Social History     Socioeconomic History    Marital status:      Spouse name: Not on file    Number of children: Not on file    Years of education: Not on file    Highest education level: Not on file   Occupational History    Not on file   Social Needs    Financial resource strain: Not hard at all    Food insecurity     Worry: Never true     Inability: Never true    Transportation needs     Medical: No     Non-medical: No   Tobacco Use    Smoking status: Never Smoker    Smokeless tobacco: Never Used   Substance and Sexual Activity    Alcohol use: Yes     Alcohol/week: 0.0 standard drinks     Comment: daily    Drug use: No    Sexual activity: Yes     Partners: Female   Lifestyle    Physical activity     Days per week: 7 days     Minutes per session: 150+ min    Stress: Not at all   Relationships    Social connections     Talks on phone: More than three times a week     Gets together: More than three times a week     Attends Gnosticist service: Not on file     Active member of club or organization: Yes     Attends meetings of clubs or organizations: More than 4 times per year     Relationship status:    Other Topics Concern    Not on file   Social History Narrative    He lives in [facility]. He lives with his wife and 2 children. His children are attending [employer]. He lived [place]. . Nonsmoker. Social drinker. 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 His son attended [employer].        MEDICATIONS:     Current Outpatient Medications:     amLODIPine (NORVASC) 10 MG tablet, Take 1 tablet (10 mg total) by mouth once daily., Disp: 90 tablet, Rfl: 0    blood sugar diagnostic Strp, 1 strip by Misc.(Non-Drug; Combo Route) route 3 (three) times daily before meals., Disp: 100 strip, Rfl: 11    clotrimazole-betamethasone 1-0.05% (LOTRISONE) cream, Apply topically 2 (two) times daily., Disp: 45 g, Rfl: 5    hydroCHLOROthiazide (HYDRODIURIL) 25 MG tablet, Take  "25 mg by mouth once daily. , Disp: , Rfl:     insulin (BASAGLAR KWIKPEN U-100 INSULIN) glargine 100 units/mL (3mL) SubQ pen, Inject 25 units into the skin every evening, Disp: 15 mL, Rfl: 5    losartan (COZAAR) 100 MG tablet, Take 100 mg by mouth once daily. , Disp: , Rfl:     pen needle, diabetic 32 gauge x 5/32" Ndle, Use nightly as directed with insulin pen., Disp: 100 each, Rfl: 5    dicyclomine (BENTYL) 20 mg tablet, Take 1 tablet (20 mg total) by mouth 3 (three) times daily as needed. (Patient not taking: Reported on 9/23/2020), Disp: 60 tablet, Rfl: 2    glipiZIDE (GLUCOTROL) 10 MG TR24, Take 1 tablet (10 mg total) by mouth once daily., Disp: 30 tablet, Rfl: 6    ALLERGIES:   Review of patient's allergies indicates:   Allergen Reactions    Metformin      GI intolerance    Lisinopril         PHYSICAL EXAMINATION:  /77   Pulse 85   Ht 6' 1" (1.854 m)   Wt 112.5 kg (248 lb)   BMI 32.72 kg/m²   General: Well-developed well-nourished 65 y.o. malein no acute distress   Cardiovascular: Regular rhythm by palpation of distal pulse, normal color and temperature, no concerning varicosities on symptomatic side   Lungs: No labored breathing or wheezing appreciated   Neuro: Alert and oriented ×3   Psychiatric: well oriented to person, place and time, demonstrates normal mood and affect   Skin: No rashes, lesions or ulcers, normal temperature, turgor, and texture on involved extremity    Ortho/SPM Exam  Intact extensor mechanism. No effusion or prepatellar swelling. Lateral patellar tracking. No patellar apprehension. Normal patellar mobility. Full extension. Flexion to 130 with moderate pain over the medial joint line. No pain with forced flexion or extension. Prominent tenderness along the medial and lateral joint line. Negative Daniel's. Negative Lachman. Stable to varus/valgus stress testing at 0 and 30 deg. Negative posterior drawer. Ligamentously stable.      IMAGING:  X-rays including standing, " weight bearing AP and flexion bilateral knees, LEFT knee lateral and sunrise views ordered and images reviewed by me show:    Moderate tricompartmental joint space narrowing without evidence of fracture or dislocation.  ASSESSMENT:      ICD-10-CM ICD-9-CM   1. Primary osteoarthritis of left knee  M17.12 715.16   2. Pain in both knees, unspecified chronicity  M25.561 719.46    M25.562        PLAN:       -Findings and treatment options were discussed with the patient  -I believe this to be an arthritic flare up due to overuse.  -Prescribe Naproxen 500mg PO BID for pain  -Knee conditioning program given in clinic today  -RICE treatment (patient has knee sleeve at home)  -Advised against cortisone injection due to recent A1C of 8.8.  -RTC 3 weeks, if symptoms persist will discuss PT vs. viscosupplementation.  -All questions answered      Procedures

## 2020-11-18 DIAGNOSIS — Z79.4 TYPE 2 DIABETES MELLITUS WITH HYPERGLYCEMIA, WITH LONG-TERM CURRENT USE OF INSULIN: ICD-10-CM

## 2020-11-18 DIAGNOSIS — E11.65 TYPE 2 DIABETES MELLITUS WITH HYPERGLYCEMIA, WITH LONG-TERM CURRENT USE OF INSULIN: ICD-10-CM

## 2020-11-19 RX ORDER — INSULIN GLARGINE 100 [IU]/ML
INJECTION, SOLUTION SUBCUTANEOUS
Qty: 15 ML | Refills: 5 | Status: SHIPPED | OUTPATIENT
Start: 2020-11-19 | End: 2020-11-23

## 2020-11-20 ENCOUNTER — PATIENT OUTREACH (OUTPATIENT)
Dept: ADMINISTRATIVE | Facility: OTHER | Age: 65
End: 2020-11-20

## 2020-11-23 ENCOUNTER — OFFICE VISIT (OUTPATIENT)
Dept: ENDOCRINOLOGY | Facility: CLINIC | Age: 65
End: 2020-11-23
Payer: COMMERCIAL

## 2020-11-23 ENCOUNTER — TELEPHONE (OUTPATIENT)
Dept: ENDOCRINOLOGY | Facility: CLINIC | Age: 65
End: 2020-11-23

## 2020-11-23 ENCOUNTER — TELEPHONE (OUTPATIENT)
Dept: SPORTS MEDICINE | Facility: CLINIC | Age: 65
End: 2020-11-23

## 2020-11-23 VITALS
SYSTOLIC BLOOD PRESSURE: 138 MMHG | DIASTOLIC BLOOD PRESSURE: 92 MMHG | HEIGHT: 72 IN | WEIGHT: 246.88 LBS | OXYGEN SATURATION: 97 % | HEART RATE: 78 BPM | TEMPERATURE: 97 F | BODY MASS INDEX: 33.44 KG/M2

## 2020-11-23 DIAGNOSIS — E11.65 TYPE 2 DIABETES MELLITUS WITH HYPERGLYCEMIA, WITH LONG-TERM CURRENT USE OF INSULIN: Primary | ICD-10-CM

## 2020-11-23 DIAGNOSIS — Z79.4 TYPE 2 DIABETES MELLITUS WITH HYPERGLYCEMIA, WITH LONG-TERM CURRENT USE OF INSULIN: Primary | ICD-10-CM

## 2020-11-23 DIAGNOSIS — R53.83 FATIGUE, UNSPECIFIED TYPE: ICD-10-CM

## 2020-11-23 PROCEDURE — 99214 PR OFFICE/OUTPT VISIT, EST, LEVL IV, 30-39 MIN: ICD-10-PCS | Mod: S$GLB,,, | Performed by: INTERNAL MEDICINE

## 2020-11-23 PROCEDURE — 3075F PR MOST RECENT SYSTOLIC BLOOD PRESS GE 130-139MM HG: ICD-10-PCS | Mod: CPTII,S$GLB,, | Performed by: INTERNAL MEDICINE

## 2020-11-23 PROCEDURE — 99999 PR PBB SHADOW E&M-EST. PATIENT-LVL V: CPT | Mod: PBBFAC,,, | Performed by: INTERNAL MEDICINE

## 2020-11-23 PROCEDURE — 99999 PR PBB SHADOW E&M-EST. PATIENT-LVL V: ICD-10-PCS | Mod: PBBFAC,,, | Performed by: INTERNAL MEDICINE

## 2020-11-23 PROCEDURE — 3052F PR MOST RECENT HEMOGLOBIN A1C LEVEL 8.0 - < 9.0%: ICD-10-PCS | Mod: CPTII,S$GLB,, | Performed by: INTERNAL MEDICINE

## 2020-11-23 PROCEDURE — 3075F SYST BP GE 130 - 139MM HG: CPT | Mod: CPTII,S$GLB,, | Performed by: INTERNAL MEDICINE

## 2020-11-23 PROCEDURE — 3078F PR MOST RECENT DIASTOLIC BLOOD PRESSURE < 80 MM HG: ICD-10-PCS | Mod: CPTII,S$GLB,, | Performed by: INTERNAL MEDICINE

## 2020-11-23 PROCEDURE — 1126F PR PAIN SEVERITY QUANTIFIED, NO PAIN PRESENT: ICD-10-PCS | Mod: S$GLB,,, | Performed by: INTERNAL MEDICINE

## 2020-11-23 PROCEDURE — 3078F DIAST BP <80 MM HG: CPT | Mod: CPTII,S$GLB,, | Performed by: INTERNAL MEDICINE

## 2020-11-23 PROCEDURE — 1126F AMNT PAIN NOTED NONE PRSNT: CPT | Mod: S$GLB,,, | Performed by: INTERNAL MEDICINE

## 2020-11-23 PROCEDURE — 3052F HG A1C>EQUAL 8.0%<EQUAL 9.0%: CPT | Mod: CPTII,S$GLB,, | Performed by: INTERNAL MEDICINE

## 2020-11-23 PROCEDURE — 3008F BODY MASS INDEX DOCD: CPT | Mod: CPTII,S$GLB,, | Performed by: INTERNAL MEDICINE

## 2020-11-23 PROCEDURE — 99214 OFFICE O/P EST MOD 30 MIN: CPT | Mod: S$GLB,,, | Performed by: INTERNAL MEDICINE

## 2020-11-23 PROCEDURE — 3008F PR BODY MASS INDEX (BMI) DOCUMENTED: ICD-10-PCS | Mod: CPTII,S$GLB,, | Performed by: INTERNAL MEDICINE

## 2020-11-23 RX ORDER — DULAGLUTIDE 0.75 MG/.5ML
0.75 INJECTION, SOLUTION SUBCUTANEOUS
Qty: 4 PEN | Refills: 6 | Status: CANCELLED | OUTPATIENT
Start: 2020-11-23 | End: 2020-12-23

## 2020-11-23 RX ORDER — INSULIN GLARGINE 100 [IU]/ML
INJECTION, SOLUTION SUBCUTANEOUS
Qty: 15 ML | Refills: 6 | Status: SHIPPED | OUTPATIENT
Start: 2020-11-23 | End: 2021-03-01 | Stop reason: SDUPTHER

## 2020-11-23 RX ORDER — DULAGLUTIDE 0.75 MG/.5ML
0.75 INJECTION, SOLUTION SUBCUTANEOUS
Qty: 4 PEN | Refills: 6 | Status: SHIPPED | OUTPATIENT
Start: 2020-11-23 | End: 2020-11-24 | Stop reason: SDUPTHER

## 2020-11-23 RX ORDER — FLASH GLUCOSE SCANNING READER
1 EACH MISCELLANEOUS DAILY
Qty: 1 EACH | Refills: 0 | Status: SHIPPED | OUTPATIENT
Start: 2020-11-23 | End: 2021-12-06 | Stop reason: SDUPTHER

## 2020-11-23 RX ORDER — FLASH GLUCOSE SENSOR
2 KIT MISCELLANEOUS
Qty: 2 KIT | Refills: 11 | Status: SHIPPED | OUTPATIENT
Start: 2020-11-23 | End: 2021-12-06 | Stop reason: SDUPTHER

## 2020-11-23 NOTE — TELEPHONE ENCOUNTER
----- Message from Vilma Lester sent at 11/23/2020  4:01 PM CST -----  Contact: [phone]  Type:  Needs Medical Advice    Who Called: PT  Reason for Call: regarding  his prescription for dulaglutide (TRULICITY) 0.75 mg/0.5 mL pen injector, pt states the Prescription was not at the pharmacy  Pharmacy name and phone #:  [id number] [place] - [id number] [facility] [address]  Would the patient rather a call back or a response via [facility]? Call back  Best Call Back Number: [phone]

## 2020-11-23 NOTE — PROGRESS NOTES
Subjective:      Patient ID: Sj Gonzalez is a 65 y.o. male.    Chief Complaint:  Medication Problem (metformin) and Follow-up      History of Present Illness  65-year-old male with hypertension referred here for evaluation of Diabetes mellitus type 2      Diagnosed with diabetes>5 years ago     Current diabetes medications include:     Glipizide 10 mg ER   Basaglar 25   Didn't  tolerate metformin due to gi issues     Currently checking only fasting sugars, >200      Hypoglycemia: nerver had lows in the past     Diabetes Education in the past - No  (offered diabetes education referral, patient refused)        Meals:       Breakfast- eggs and toast with robles+ coffee    Lunch-  Skips/fried food/hamburger    Dinner- Home cooked meals    Snacks- minimal    Drinks- water, diet iced  tea     Activity- active loading and unloading      Weight has  Been stable     Any hospitalizations r/t to diabetes over last year: No     Chronic Complications:          Microvascular -  eye exam -8/2020, No Diabetic  retinopathy                              no proteinuria.  No numbness and tingling in feet    Macrovascular -no coronary artery disease or stroke       Autonomic Dysfunction - denies gastroparesis, history of incontinence at night       Denies foot ulcers, deformities.  Performs foot care at home.     Steroids recently - no       On further interviewing patient reports he is fatigued all the time.  Also reports issues with erectile dysfunction.  Requests to check for testosterone level    Diabetes Management Status    Statin: Not taking  ACE/ARB: Taking    Screening or Prevention Patient's value Goal Complete/Controlled?   HgA1C Testing and Control   Lab Results   Component Value Date    HGBA1C 8.8 (H) 06/20/2020      Annually/Less than 8% No   Lipid profile : 06/20/2020 Annually Yes   LDL control Lab Results   Component Value Date    LDLCALC 79.4 06/20/2020    Annually/Less than 100 mg/dl  Yes   Nephropathy screening Lab  Results   Component Value Date    LABMICR <2.5 10/08/2019     Lab Results   Component Value Date    PROTEINUA Negative 05/24/2018    Annually No   Blood pressure BP Readings from Last 1 Encounters:   11/23/20 (!) 138/92    Less than 140/90 No   Dilated retinal exam : 08/24/2020 Annually Yes   Foot exam   : 04/23/2019 Annually No       Review of Systems   Review of 7 systems negative except as documented above    Objective:     BP (!) 138/92 (BP Location: Right arm, Patient Position: Sitting, BP Method: Medium (Automatic))   Pulse 78   Temp 97.2 °F (36.2 °C) (Temporal)   Ht 6' (1.829 m)   Wt 112 kg (246 lb 14.4 oz)   SpO2 97%   BMI 33.49 kg/m²   BP Readings from Last 3 Encounters:   11/23/20 (!) 138/92   11/04/20 129/77   09/25/20 (!) 143/92     Wt Readings from Last 1 Encounters:   11/23/20 1311 112 kg (246 lb 14.4 oz)     Body mass index is 33.49 kg/m².      Physical Exam  Vitals signs reviewed.   Constitutional:       Appearance: Normal appearance. He is well-developed.   HENT:      Head: Normocephalic and atraumatic.   Cardiovascular:      Rate and Rhythm: Normal rate.   Pulmonary:      Effort: Pulmonary effort is normal.      Breath sounds: Normal breath sounds.   Neurological:      Mental Status: He is alert and oriented to person, place, and time.   Psychiatric:         Judgment: Judgment normal.                Lab Review:   Lab Results   Component Value Date    HGBA1C 8.8 (H) 06/20/2020     Lab Results   Component Value Date    CHOL 135 06/20/2020    HDL 44 06/20/2020    LDLCALC 79.4 06/20/2020    TRIG 58 06/20/2020    CHOLHDL 32.6 06/20/2020     Lab Results   Component Value Date     06/20/2020    K 5.0 06/20/2020     06/20/2020    CO2 30 (H) 06/20/2020     (H) 06/20/2020    BUN 17 06/20/2020    CREATININE 0.75 06/20/2020    CALCIUM 9.4 06/20/2020    PROT 7.4 06/20/2020    ALBUMIN 4.4 06/20/2020    BILITOT 0.8 06/20/2020    ALKPHOS 58 06/20/2020    AST 51 (H) 06/20/2020    ALT 56 (H)  06/20/2020    ANIONGAP 6 (L) 06/20/2020    ESTGFRAFRICA >60.0 06/20/2020    EGFRNONAA >60.0 06/20/2020    TSH 2.660 05/24/2018     No results found for: [id number]    Assessment and Plan     Type 2 diabetes mellitus with hyperglycemia    65-year-old male with history of long-standing type 2 diabetes complicated with hyperglycemia here for further evaluation.  Most recent A1c elevated at 8.8.  Goal A1c is around 7 with no low sugars     Had  In depth discussion regarding diabetes disease process with emphasis on risks of  hyperglycemia.  Also discussed in detail about the diabetic dietary lifestyle-with emphasis on low glycemic foods and portion control.      Self-reported fasting sugars readings>200  Will increase basaglar to 28 units.  Will continue on glipizide 10 mg ER once daily.    Patient wants to try trulicity, discussed side effects with patient in detail.  Potential side effect: Nausea is expected as your body adjusts to this medication.  If nausea is severe and not tolerable please call the office.    Potential adverse reactions -  Pancreatitis (inflammation of the pancreas) - signs and symptoms include severe nausea, abdominal pain which may or may not radiate (extend) to the back, and/or vomiting.  If you develop these symptoms STOP this medication and call the office IMMEDIATELY.   If you are vomiting with abdominal pain you may need to go the emergency room.     Medullary thyroid cancer particularly shown in rats/mice is another     Patient verbalized understanding and wants to try trulicity    Patient was recommended to check his sugars before each meal and bedtime and send his sugar logs in 2 weeks for review.    Hyperlipidemia - LDL at goal.  Diet-controlled    Advised foot care.    Obesity - benefits of weight loss with emphasis on lowering insulin resistance reviewed, recommend following meal plan and increasing activity.    Fatigue with decreased erectile dysfunction:  Will order testosterone  levels.  Will also order TFTs.     Patient verbalized understanding of the above document plan

## 2020-11-23 NOTE — TELEPHONE ENCOUNTER
I spoke with the pharmacy to conform the medication was not received. Once I confirmed I sent a message to the provider in regards to the medication to be resent. I also reached out the the patient to inform him I have reached out to the provider in regards to his medication. The patient asked if this would be resolved today, I informed the patient that Dr. Lyles is scheduled to be out this week, but I reached out to her to inform her the medication was not received at the pharmacy Patient has voiced understanding.

## 2020-11-23 NOTE — TELEPHONE ENCOUNTER
Spoke with stephanie Gustafson to see if he wanted to reschedule his appointment that he cancel for tomorrow.  Patient Sj state that his knee is doing so much better and that the med been helping.  If needed he will reschedule.

## 2020-11-24 PROBLEM — R53.83 FATIGUE: Status: ACTIVE | Noted: 2020-11-24

## 2020-11-24 NOTE — ASSESSMENT & PLAN NOTE
65-year-old male with history of long-standing type 2 diabetes complicated with hyperglycemia here for further evaluation.  Most recent A1c elevated at 8.8.  Goal A1c is around 7 with no low sugars     Had  In depth discussion regarding diabetes disease process with emphasis on risks of  hyperglycemia.  Also discussed in detail about the diabetic dietary lifestyle-with emphasis on low glycemic foods and portion control.      Self-reported fasting sugars readings>200  Will increase basaglar to 28 units.  Will continue on glipizide 10 mg ER once daily.    Patient wants to try trulicity, discussed side effects with patient in detail.  Potential side effect: Nausea is expected as your body adjusts to this medication.  If nausea is severe and not tolerable please call the office.    Potential adverse reactions -  Pancreatitis (inflammation of the pancreas) - signs and symptoms include severe nausea, abdominal pain which may or may not radiate (extend) to the back, and/or vomiting.  If you develop these symptoms STOP this medication and call the office IMMEDIATELY.   If you are vomiting with abdominal pain you may need to go the emergency room.     Medullary thyroid cancer particularly shown in rats/mice is another     Patient verbalized understanding and wants to try trulicity    Patient was recommended to check his sugars before each meal and bedtime and send his sugar logs in 2 weeks for review.    Hyperlipidemia - LDL at goal.  Diet-controlled    Advised foot care.    Obesity - benefits of weight loss with emphasis on lowering insulin resistance reviewed, recommend following meal plan and increasing activity.    Fatigue with decreased erectile dysfunction:  Will order testosterone levels.  Will also order TFTs.     Patient verbalized understanding of the above document plan

## 2020-11-30 RX ORDER — DULAGLUTIDE 0.75 MG/.5ML
0.75 INJECTION, SOLUTION SUBCUTANEOUS
Qty: 4 PEN | Refills: 6 | Status: SHIPPED | OUTPATIENT
Start: 2020-11-30 | End: 2020-12-30

## 2020-12-01 NOTE — TELEPHONE ENCOUNTER
----- Message from Adali Austin sent at 12/1/2020 10:09 AM CST -----  Regarding: call back  Contact: [phone]  Patient is calling to see if his medication was sent to the pharmacy dulaglutide (TRULICITY) 0.75 mg/0.5 mL pen injector 4 pen.

## 2020-12-22 RX ORDER — LOSARTAN POTASSIUM 100 MG/1
100 TABLET ORAL DAILY
Qty: 30 TABLET | Refills: 0 | Status: SHIPPED | OUTPATIENT
Start: 2020-12-22 | End: 2021-01-23 | Stop reason: SDUPTHER

## 2020-12-22 RX ORDER — HYDROCHLOROTHIAZIDE 25 MG/1
25 TABLET ORAL DAILY
Qty: 30 TABLET | Refills: 0 | Status: SHIPPED | OUTPATIENT
Start: 2020-12-22 | End: 2021-01-23 | Stop reason: SDUPTHER

## 2020-12-22 NOTE — TELEPHONE ENCOUNTER
----- Message from Stephy Gallegos sent at 12/22/2020  2:46 PM CST -----  Requesting an RX refill or new RX.  Is this a refill or new RX: refill  RX name and strength: losartan (COZAAR) 100 MG tablet  Is this a 30 day or 90 day RX: 30  Pharmacy name and phone # (copy/paste from chart):  [address], [place] - [address] [address] [phone] (Phone)  [phone] (Fax)      Comments:       Requesting an RX refill or new RX.  Is this a refill or new RX: refill  RX name and strength: hydroCHLOROthiazide (HYDRODIURIL) 25 MG tablet  Is this a 30 day or 90 day RX: 30  Pharmacy name and phone # (copy/paste from chart):  [address], [place] - [address] [address] [phone] (Phone)  [phone] (Fax)      Comments:

## 2020-12-30 ENCOUNTER — TELEPHONE (OUTPATIENT)
Dept: UROLOGY | Facility: CLINIC | Age: 65
End: 2020-12-30

## 2020-12-30 ENCOUNTER — PATIENT MESSAGE (OUTPATIENT)
Dept: UROLOGY | Facility: CLINIC | Age: 65
End: 2020-12-30

## 2020-12-30 NOTE — TELEPHONE ENCOUNTER
----- Message from Niru Boland sent at 12/30/2020  3:38 PM CST -----  Type:  Patient Call    Who Called Sj   Would the patient rather a call back or a response via [employer]? Call back   Best Call Back Number: [phone]  Additional Information: Pt checking to see if Rx for Cialis was sent to pharmacy

## 2020-12-30 NOTE — TELEPHONE ENCOUNTER
Unable to contact pt regarding denial of cialis - pt needs an appt    [employer] msg also sent

## 2020-12-31 ENCOUNTER — TELEPHONE (OUTPATIENT)
Dept: UROLOGY | Facility: CLINIC | Age: 65
End: 2020-12-31

## 2020-12-31 NOTE — TELEPHONE ENCOUNTER
----- Message from Niru Boland sent at 12/31/2020  1:10 PM CST -----  Who Called Sj   Would the patient rather a call back or a response via [facility]? Call back   Best Call Back Number: [phone]  Additional Information: Pt checking to see if Rx for Cialis was sent to pharmacy. Pt says he has been calling for 2 days and all he needs if for the Rx to be submitted to the Pharmacy      Referred By    PCP: FATOU FERNÁNDEZ  [address] [place] [address]  [phone] - [phone] - F      Quality    no exposure to secondhand smoke from cigarettes.      History of Present Illness  EUNICE LOPEZ is a(n) 7 year old girl here today for a pediatric cardiac re-evaluation for AVSD repair at the [facility]. She was last seen on 12/7/18.   EUNICE HODGE is accompanied by her mother.     Eunice was prenatally diagnosed with an intermediate atrioventricular septal defect and underwent repair on 11/25/13. By report of her mother, Eunice has been doing well since her evaluation one year ago. She has a fistula at her former G-tube site, and is planned for surgical closure in a couple of weeks. Mother also states that Dr. Gannon will do her intubation and assess her airway.     She denies experiencing presyncope/syncope, shortness of breath, dyspnea during exertion, tachypnea, cyanosis, pallor, easy fatigability, weakness and effort intolerance.   She is active and has no problems keeping up with peers.      Review of Systems    Const: Normal.   Eyes: Normal.   ENT: Normal.   CV:. Per HPI.   Resp: Normal.   GI: Normal.   Neuro: Normal.   Skin: Normal.   Heme/Lymph: Normal.   Psych: Normal.     All other systems reviewed and negative.      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 Followed by Dr. Gannon (ENT)    Surgical History  History of Percutaneous Placement Of Gastrostomy Tube   · 2/2014, with Nissen fundoplication  History of Surgery   · s/p single patch repair of AVSD with closure of mitral valve cleft, PDA ligation, and PFO      primary closure 11/25/13    Family History  Problems   Denied: Family history of acute myocardial infarction  Denied: Family history of cardiac arrhythmia  Denied: Family history of cardiac pacemaker  Denied: Family history of cardiomyopathy  Denied: Family history of congenital heart defect  Denied: Family history of sudden death  No pertinent family history : Mother, Father, Child    Social History  Lives with parents  She lives with her mother and father. Her parents are . there is joint custody.   She is in .      Current Meds  Enalapril Maleate Powder; COMPOUND AS 1 MG/ML.  TAKE 1 ML BY MOUTH TWICE  DAILY;  Therapy: [id number] to (Evaluate:[id number])  Requested for: [id number]; Last  Rx:[id number] Ordered  Multivitamin LIQD;  Therapy: (Recorded:[id number]) to Recorded    Vitals  Vitals   Recorded: 07Nov2018 12:39PM   Height: 96.5 cm  Weight: 14.2 kg  BMI Calculated: 15.25  BSA Calculated: 0.61  BMI Percentile: 53  2-20 Stature Percentile: 1 %  2-20 Weight Percentile: 1 %  Blood Pressure: 110 / 51, RUE, Sitting  Heart Rate: 122  O2 Saturation: 98    Physical Exam  Constitutional: well developed, in no acute distress and interactive . Small for age.     Head and Face: normocephalic and atraumatic. no dysmorphic features were observed.     Eyes: no discharge and normal conjunctiva. the sclerae were normal.     ENT: normal appearing outer ear and normal appearing nose. no nasal discharge. normal lips. oral mucosa pink and moist.     Neck: normal appearing neck.     Chest: no thoracic asymmetry and no chest deformity.   Surgical Scar(s): median sternotomy.   Well healed.     Pulmonary: no respiratory distress, normal  respiratory rate and effort and no accessory muscle use. breath sounds clear to auscultation bilaterally.     Cardiovascular: The heart rate was normal. The rhythm was regular. Heart sounds: normal S1, normal S2, no gallop, no click, no pericardial rub.   Murmurs: no murmurs heard. A grade 1/6 systolic murmur was heard at the LMSB.   Radial Pulse: right 2+ and left 2+.   Femoral Pulse: right 2+ and left 2+. no pulse delay and capillary refill < 2 secs.   Lower Extremities: no edema present.     Abdomen: soft, nontender and nondistended. no hepatomegaly.     Neurologic: no coordination deficits observed and developmentally appropriate for age. the mood/affect was appropriate for age.     Skin: normal skin color and pigmentation, normal bruising for age/development, no rash, no central cyanosis and no peripheral cyanosis. normal skin turgor.     Musculoskeletal: muscle strength and tone were grossly normal.        Results/Data    ECG RESULTS.   Normal sinus rhythm at 112 bpm with rare premature ventricular contractions, left axis deviation, right bundle branch block with right ventricular hypertrophy, right atrial enlargement.     ECHOCARDIOGRAM RESULTS:   Repaired AV septal defect with no apparent residual septal defects.  Mild bilateral atrioventricular valve regurgitation, no AV valve stenosis.  Elongated left ventricular outflow tract with redundant AV valve tissue noted, but no apparent obstruction to flow. Mild central aortic insufficiency.   Normal biventricular size and function, with no ventricular hypertrophy.   No pericardial or obvious pleural effusion.      Time    Total face to face time spent with patient 30 minutes. Greater than 50% of the total time was spent counseling and/or coordinating care.      Discussion/Summary    Impression:   It is my impression that Eunice is clinically doing well. Her degree of atrioventricular valve regurgitation is mild bilaterally by echocardiogram today. I explained to  her mother that the dose of enalapril she currently takes is quite low for her weight, and I do not suspect that she requires it for management of clinical symptoms. Therefore, I would recommend stopping enalapril. Her mother is aware that we will need to continue to monitor the function of her atrioventricular valves over time, given the risk for progressive regurgitation.        Assessment   1. Atrioventricular canal (AVC), intermediate (Q21.2)   2. S/P atrioventricular septal defect repair (Z87.74)    Plan  Medications    · Enalapril Maleate Powder  Orders    · prc CONGENITAL 2-D ECHO W/ DOPPLER SPECTRAL AND COLOR FLOW, IN-OFFICE;  Status:Complete;   Done: 07Nov2018 05:07PM   · prc EKG ROUTINE 12 LEAD W/INTERPRETATION & REPORT; Status:Complete;   Done:  07Nov2018 05:07PM    Medication Management:   Continue current medical regimen without any changes.     Immunizations:   Routine immunizations should be provided, including influenza for patients over 6 months of age.    RICHARD HODGE does not qualify for RSV prophylaxis.     SBE Prophylaxis:   No indication for SBE prophylaxis.     Physical Activity:   Based on available history and data, the patient is at no greater risk than the general population for adverse cardiac events with exertion.     Follow-Up:   RICHARD HODGE should return [facility] year(s) when we will most likely order a(n) ECG and echocardiogram.     Surgery/Anesthesia: Based on the available history and data, any surgery, anesthesia or sedation should be performed at an institution with expertise in congenital heart disease.      Signatures   Electronically signed by : SABRA GATES M.D.; Nov 7 2018  5:08PM CST

## 2020-12-31 NOTE — TELEPHONE ENCOUNTER
Spoke to pt     He hasnt been seen in over a year and needs to make n appt for any refills.    Pt denied making an appt at this time

## 2021-01-04 ENCOUNTER — PATIENT MESSAGE (OUTPATIENT)
Dept: ADMINISTRATIVE | Facility: HOSPITAL | Age: 66
End: 2021-01-04

## 2021-01-23 DIAGNOSIS — I10 ESSENTIAL HYPERTENSION: ICD-10-CM

## 2021-01-25 DIAGNOSIS — I10 ESSENTIAL HYPERTENSION: ICD-10-CM

## 2021-01-26 RX ORDER — HYDROCHLOROTHIAZIDE 25 MG/1
25 TABLET ORAL DAILY
Qty: 30 TABLET | Refills: 0 | OUTPATIENT
Start: 2021-01-26

## 2021-01-26 RX ORDER — LOSARTAN POTASSIUM 100 MG/1
100 TABLET ORAL DAILY
Qty: 30 TABLET | Refills: 0 | OUTPATIENT
Start: 2021-01-26

## 2021-01-26 RX ORDER — HYDROCHLOROTHIAZIDE 25 MG/1
TABLET ORAL
Qty: 30 TABLET | Refills: 0 | OUTPATIENT
Start: 2021-01-26

## 2021-01-26 RX ORDER — AMLODIPINE BESYLATE 10 MG/1
10 TABLET ORAL DAILY
Qty: 90 TABLET | Refills: 0 | OUTPATIENT
Start: 2021-01-26

## 2021-01-26 RX ORDER — HYDROCHLOROTHIAZIDE 25 MG/1
25 TABLET ORAL DAILY
Qty: 30 TABLET | Refills: 0 | Status: SHIPPED | OUTPATIENT
Start: 2021-01-26 | End: 2021-02-23 | Stop reason: SDUPTHER

## 2021-01-26 RX ORDER — LOSARTAN POTASSIUM 100 MG/1
TABLET ORAL
Qty: 30 TABLET | Refills: 0 | OUTPATIENT
Start: 2021-01-26

## 2021-01-26 RX ORDER — LOSARTAN POTASSIUM 100 MG/1
100 TABLET ORAL DAILY
Qty: 30 TABLET | Refills: 0 | Status: SHIPPED | OUTPATIENT
Start: 2021-01-26 | End: 2021-02-23 | Stop reason: SDUPTHER

## 2021-01-26 RX ORDER — AMLODIPINE BESYLATE 10 MG/1
10 TABLET ORAL DAILY
Qty: 90 TABLET | Refills: 0 | Status: SHIPPED | OUTPATIENT
Start: 2021-01-26 | End: 2021-03-01 | Stop reason: SDUPTHER

## 2021-01-26 RX ORDER — AMLODIPINE BESYLATE 10 MG/1
TABLET ORAL
Qty: 90 TABLET | Refills: 0 | OUTPATIENT
Start: 2021-01-26

## 2021-02-15 ENCOUNTER — OFFICE VISIT (OUTPATIENT)
Dept: OPTOMETRY | Facility: CLINIC | Age: 66
End: 2021-02-15
Payer: COMMERCIAL

## 2021-02-15 DIAGNOSIS — H11.432 CONJUNCTIVAL HYPEREMIA OF LEFT EYE: ICD-10-CM

## 2021-02-15 DIAGNOSIS — H02.846 PAIN AND SWELLING OF EYELID OF LEFT EYE: Primary | ICD-10-CM

## 2021-02-15 DIAGNOSIS — H00.025 HORDEOLUM INTERNUM OF LEFT LOWER EYELID: ICD-10-CM

## 2021-02-15 DIAGNOSIS — H02.89 PAIN AND SWELLING OF EYELID OF LEFT EYE: Primary | ICD-10-CM

## 2021-02-15 PROCEDURE — 99999 PR PBB SHADOW E&M-EST. PATIENT-LVL I: ICD-10-PCS | Mod: PBBFAC,,, | Performed by: OPTOMETRIST

## 2021-02-15 PROCEDURE — 99999 PR PBB SHADOW E&M-EST. PATIENT-LVL I: CPT | Mod: PBBFAC,,, | Performed by: OPTOMETRIST

## 2021-02-15 PROCEDURE — 1125F PR PAIN SEVERITY QUANTIFIED, PAIN PRESENT: ICD-10-PCS | Mod: S$GLB,,, | Performed by: OPTOMETRIST

## 2021-02-15 PROCEDURE — 92012 PR EYE EXAM, EST PATIENT,INTERMED: ICD-10-PCS | Mod: S$GLB,,, | Performed by: OPTOMETRIST

## 2021-02-15 PROCEDURE — 1125F AMNT PAIN NOTED PAIN PRSNT: CPT | Mod: S$GLB,,, | Performed by: OPTOMETRIST

## 2021-02-15 PROCEDURE — 92012 INTRM OPH EXAM EST PATIENT: CPT | Mod: S$GLB,,, | Performed by: OPTOMETRIST

## 2021-02-15 RX ORDER — DULAGLUTIDE 0.75 MG/.5ML
INJECTION, SOLUTION SUBCUTANEOUS
COMMUNITY
Start: 2021-01-17 | End: 2021-03-01

## 2021-02-15 RX ORDER — DOXYCYCLINE 100 MG/1
100 CAPSULE ORAL 2 TIMES DAILY
Qty: 20 CAPSULE | Refills: 0 | Status: SHIPPED | OUTPATIENT
Start: 2021-02-15 | End: 2021-03-01

## 2021-02-15 RX ORDER — TOBRAMYCIN AND DEXAMETHASONE 3; 1 MG/ML; MG/ML
1-2 SUSPENSION/ DROPS OPHTHALMIC 4 TIMES DAILY
Qty: 2.5 ML | Refills: 0 | Status: SHIPPED | OUTPATIENT
Start: 2021-02-15 | End: 2021-02-25

## 2021-03-01 ENCOUNTER — PATIENT OUTREACH (OUTPATIENT)
Dept: ADMINISTRATIVE | Facility: OTHER | Age: 66
End: 2021-03-01

## 2021-03-01 ENCOUNTER — OFFICE VISIT (OUTPATIENT)
Dept: ENDOCRINOLOGY | Facility: CLINIC | Age: 66
End: 2021-03-01
Payer: COMMERCIAL

## 2021-03-01 VITALS
BODY MASS INDEX: 33.12 KG/M2 | RESPIRATION RATE: 18 BRPM | OXYGEN SATURATION: 98 % | SYSTOLIC BLOOD PRESSURE: 108 MMHG | DIASTOLIC BLOOD PRESSURE: 62 MMHG | WEIGHT: 244.5 LBS | HEIGHT: 72 IN | HEART RATE: 89 BPM

## 2021-03-01 DIAGNOSIS — E66.09 CLASS 1 OBESITY DUE TO EXCESS CALORIES WITH SERIOUS COMORBIDITY AND BODY MASS INDEX (BMI) OF 33.0 TO 33.9 IN ADULT: ICD-10-CM

## 2021-03-01 DIAGNOSIS — R79.89 LOW TESTOSTERONE: ICD-10-CM

## 2021-03-01 DIAGNOSIS — E11.43 TYPE 2 DIABETES MELLITUS WITH PERIPHERAL AUTONOMIC NEUROPATHY: ICD-10-CM

## 2021-03-01 DIAGNOSIS — N52.9 ERECTILE DYSFUNCTION, UNSPECIFIED ERECTILE DYSFUNCTION TYPE: ICD-10-CM

## 2021-03-01 DIAGNOSIS — E11.65 TYPE 2 DIABETES MELLITUS WITH HYPERGLYCEMIA, WITH LONG-TERM CURRENT USE OF INSULIN: Primary | ICD-10-CM

## 2021-03-01 DIAGNOSIS — I10 ESSENTIAL HYPERTENSION: ICD-10-CM

## 2021-03-01 DIAGNOSIS — Z79.4 TYPE 2 DIABETES MELLITUS WITH HYPERGLYCEMIA, WITH LONG-TERM CURRENT USE OF INSULIN: Primary | ICD-10-CM

## 2021-03-01 PROCEDURE — 3078F PR MOST RECENT DIASTOLIC BLOOD PRESSURE < 80 MM HG: ICD-10-PCS | Mod: CPTII,S$GLB,, | Performed by: INTERNAL MEDICINE

## 2021-03-01 PROCEDURE — 99214 OFFICE O/P EST MOD 30 MIN: CPT | Mod: S$GLB,,, | Performed by: INTERNAL MEDICINE

## 2021-03-01 PROCEDURE — 3008F BODY MASS INDEX DOCD: CPT | Mod: CPTII,S$GLB,, | Performed by: INTERNAL MEDICINE

## 2021-03-01 PROCEDURE — 3008F PR BODY MASS INDEX (BMI) DOCUMENTED: ICD-10-PCS | Mod: CPTII,S$GLB,, | Performed by: INTERNAL MEDICINE

## 2021-03-01 PROCEDURE — 99214 PR OFFICE/OUTPT VISIT, EST, LEVL IV, 30-39 MIN: ICD-10-PCS | Mod: S$GLB,,, | Performed by: INTERNAL MEDICINE

## 2021-03-01 PROCEDURE — 3046F HEMOGLOBIN A1C LEVEL >9.0%: CPT | Mod: CPTII,S$GLB,, | Performed by: INTERNAL MEDICINE

## 2021-03-01 PROCEDURE — 1126F PR PAIN SEVERITY QUANTIFIED, NO PAIN PRESENT: ICD-10-PCS | Mod: S$GLB,,, | Performed by: INTERNAL MEDICINE

## 2021-03-01 PROCEDURE — 3074F PR MOST RECENT SYSTOLIC BLOOD PRESSURE < 130 MM HG: ICD-10-PCS | Mod: CPTII,S$GLB,, | Performed by: INTERNAL MEDICINE

## 2021-03-01 PROCEDURE — 1126F AMNT PAIN NOTED NONE PRSNT: CPT | Mod: S$GLB,,, | Performed by: INTERNAL MEDICINE

## 2021-03-01 PROCEDURE — 99999 PR PBB SHADOW E&M-EST. PATIENT-LVL IV: CPT | Mod: PBBFAC,,, | Performed by: INTERNAL MEDICINE

## 2021-03-01 PROCEDURE — 3046F PR MOST RECENT HEMOGLOBIN A1C LEVEL > 9.0%: ICD-10-PCS | Mod: CPTII,S$GLB,, | Performed by: INTERNAL MEDICINE

## 2021-03-01 PROCEDURE — 3072F LOW RISK FOR RETINOPATHY: CPT | Mod: S$GLB,,, | Performed by: INTERNAL MEDICINE

## 2021-03-01 PROCEDURE — 3074F SYST BP LT 130 MM HG: CPT | Mod: CPTII,S$GLB,, | Performed by: INTERNAL MEDICINE

## 2021-03-01 PROCEDURE — 99999 PR PBB SHADOW E&M-EST. PATIENT-LVL IV: ICD-10-PCS | Mod: PBBFAC,,, | Performed by: INTERNAL MEDICINE

## 2021-03-01 PROCEDURE — 3072F PR LOW RISK FOR RETINOPATHY: ICD-10-PCS | Mod: S$GLB,,, | Performed by: INTERNAL MEDICINE

## 2021-03-01 PROCEDURE — 3078F DIAST BP <80 MM HG: CPT | Mod: CPTII,S$GLB,, | Performed by: INTERNAL MEDICINE

## 2021-03-01 RX ORDER — PEN NEEDLE, DIABETIC 30 GX3/16"
NEEDLE, DISPOSABLE MISCELLANEOUS
Qty: 100 EACH | Refills: 5 | Status: SHIPPED | OUTPATIENT
Start: 2021-03-01 | End: 2021-12-06 | Stop reason: SDUPTHER

## 2021-03-01 RX ORDER — AMLODIPINE BESYLATE 10 MG/1
10 TABLET ORAL DAILY
Qty: 90 TABLET | Refills: 0 | Status: SHIPPED | OUTPATIENT
Start: 2021-03-01 | End: 2021-07-30

## 2021-03-01 RX ORDER — TADALAFIL 10 MG/1
10 TABLET ORAL DAILY PRN
Qty: 30 TABLET | Refills: 11 | Status: ON HOLD | OUTPATIENT
Start: 2021-03-01 | End: 2021-11-02

## 2021-03-01 RX ORDER — ALPHA LIPOIC ACID 100 MG
1 CAPSULE ORAL DAILY
Refills: 0 | Status: ON HOLD
Start: 2021-03-01 | End: 2021-11-02

## 2021-03-01 RX ORDER — HYDROCHLOROTHIAZIDE 25 MG/1
25 TABLET ORAL DAILY
Qty: 30 TABLET | Refills: 0 | Status: SHIPPED | OUTPATIENT
Start: 2021-03-01 | End: 2021-04-26

## 2021-03-01 RX ORDER — LOSARTAN POTASSIUM 100 MG/1
100 TABLET ORAL DAILY
Qty: 90 TABLET | Refills: 3 | Status: SHIPPED | OUTPATIENT
Start: 2021-03-01 | End: 2021-11-10 | Stop reason: SDUPTHER

## 2021-03-01 RX ORDER — INSULIN GLARGINE 100 [IU]/ML
25 INJECTION, SOLUTION SUBCUTANEOUS DAILY
Qty: 15 ML | Refills: 6 | Status: SHIPPED | OUTPATIENT
Start: 2021-03-01 | End: 2021-03-24 | Stop reason: SDUPTHER

## 2021-03-03 ENCOUNTER — PATIENT MESSAGE (OUTPATIENT)
Dept: DIABETES | Facility: CLINIC | Age: 66
End: 2021-03-03

## 2021-03-24 ENCOUNTER — TELEPHONE (OUTPATIENT)
Dept: ENDOCRINOLOGY | Facility: CLINIC | Age: 66
End: 2021-03-24

## 2021-03-24 DIAGNOSIS — E11.65 TYPE 2 DIABETES MELLITUS WITH HYPERGLYCEMIA, WITH LONG-TERM CURRENT USE OF INSULIN: ICD-10-CM

## 2021-03-24 DIAGNOSIS — Z79.4 TYPE 2 DIABETES MELLITUS WITH HYPERGLYCEMIA, WITH LONG-TERM CURRENT USE OF INSULIN: ICD-10-CM

## 2021-03-24 RX ORDER — INSULIN GLARGINE 100 [IU]/ML
25 INJECTION, SOLUTION SUBCUTANEOUS DAILY
Qty: 15 ML | Refills: 6 | Status: SHIPPED | OUTPATIENT
Start: 2021-03-24 | End: 2021-12-06 | Stop reason: SDUPTHER

## 2021-03-25 ENCOUNTER — TELEPHONE (OUTPATIENT)
Dept: ADMINISTRATIVE | Facility: HOSPITAL | Age: 66
End: 2021-03-25

## 2021-03-29 ENCOUNTER — PATIENT MESSAGE (OUTPATIENT)
Dept: ENDOCRINOLOGY | Facility: CLINIC | Age: 66
End: 2021-03-29

## 2021-03-29 ENCOUNTER — TELEPHONE (OUTPATIENT)
Dept: ENDOCRINOLOGY | Facility: CLINIC | Age: 66
End: 2021-03-29

## 2021-03-29 DIAGNOSIS — Z79.4 TYPE 2 DIABETES MELLITUS WITH HYPERGLYCEMIA, WITH LONG-TERM CURRENT USE OF INSULIN: Primary | ICD-10-CM

## 2021-03-29 DIAGNOSIS — E11.65 TYPE 2 DIABETES MELLITUS WITH HYPERGLYCEMIA, WITH LONG-TERM CURRENT USE OF INSULIN: Primary | ICD-10-CM

## 2021-04-01 RX ORDER — GLIPIZIDE 10 MG/1
10 TABLET ORAL
Qty: 90 TABLET | Refills: 3 | Status: SHIPPED | OUTPATIENT
Start: 2021-04-01 | End: 2021-11-10

## 2021-04-21 ENCOUNTER — PATIENT MESSAGE (OUTPATIENT)
Dept: ENDOCRINOLOGY | Facility: CLINIC | Age: 66
End: 2021-04-21

## 2021-04-21 ENCOUNTER — TELEPHONE (OUTPATIENT)
Dept: ENDOCRINOLOGY | Facility: CLINIC | Age: 66
End: 2021-04-21

## 2021-04-21 DIAGNOSIS — E11.65 TYPE 2 DIABETES MELLITUS WITH HYPERGLYCEMIA, WITH LONG-TERM CURRENT USE OF INSULIN: Primary | ICD-10-CM

## 2021-04-21 DIAGNOSIS — Z79.4 TYPE 2 DIABETES MELLITUS WITH HYPERGLYCEMIA, WITH LONG-TERM CURRENT USE OF INSULIN: Primary | ICD-10-CM

## 2021-04-26 RX ORDER — PIOGLITAZONEHYDROCHLORIDE 15 MG/1
15 TABLET ORAL DAILY
Qty: 90 TABLET | Refills: 3 | Status: SHIPPED | OUTPATIENT
Start: 2021-04-26 | End: 2021-12-06 | Stop reason: SDUPTHER

## 2021-05-26 ENCOUNTER — TELEPHONE (OUTPATIENT)
Dept: ADMINISTRATIVE | Facility: HOSPITAL | Age: 66
End: 2021-05-26

## 2021-06-10 ENCOUNTER — PATIENT OUTREACH (OUTPATIENT)
Dept: ADMINISTRATIVE | Facility: OTHER | Age: 66
End: 2021-06-10

## 2021-07-07 ENCOUNTER — PATIENT MESSAGE (OUTPATIENT)
Dept: ADMINISTRATIVE | Facility: HOSPITAL | Age: 66
End: 2021-07-07

## 2021-08-04 ENCOUNTER — PATIENT MESSAGE (OUTPATIENT)
Dept: ADMINISTRATIVE | Facility: HOSPITAL | Age: 66
End: 2021-08-04

## 2021-09-20 ENCOUNTER — TELEPHONE (OUTPATIENT)
Dept: FAMILY MEDICINE | Facility: CLINIC | Age: 66
End: 2021-09-20

## 2021-09-21 ENCOUNTER — TELEPHONE (OUTPATIENT)
Dept: FAMILY MEDICINE | Facility: CLINIC | Age: 66
End: 2021-09-21

## 2021-09-21 ENCOUNTER — OFFICE VISIT (OUTPATIENT)
Dept: FAMILY MEDICINE | Facility: CLINIC | Age: 66
End: 2021-09-21
Payer: COMMERCIAL

## 2021-09-21 VITALS
OXYGEN SATURATION: 94 % | BODY MASS INDEX: 35.8 KG/M2 | HEART RATE: 74 BPM | HEIGHT: 72 IN | WEIGHT: 264.31 LBS | SYSTOLIC BLOOD PRESSURE: 122 MMHG | DIASTOLIC BLOOD PRESSURE: 72 MMHG

## 2021-09-21 DIAGNOSIS — M62.830 SPASM OF LUMBAR PARASPINOUS MUSCLE: ICD-10-CM

## 2021-09-21 DIAGNOSIS — R10.31 PAIN, ABDOMINAL, RLQ: ICD-10-CM

## 2021-09-21 DIAGNOSIS — K40.90 NON-RECURRENT UNILATERAL INGUINAL HERNIA WITHOUT OBSTRUCTION OR GANGRENE: Primary | ICD-10-CM

## 2021-09-21 PROCEDURE — 4010F PR ACE/ARB THEARPY RXD/TAKEN: ICD-10-PCS | Mod: CPTII,S$GLB,, | Performed by: STUDENT IN AN ORGANIZED HEALTH CARE EDUCATION/TRAINING PROGRAM

## 2021-09-21 PROCEDURE — 4010F ACE/ARB THERAPY RXD/TAKEN: CPT | Mod: CPTII,S$GLB,, | Performed by: STUDENT IN AN ORGANIZED HEALTH CARE EDUCATION/TRAINING PROGRAM

## 2021-09-21 PROCEDURE — 3066F NEPHROPATHY DOC TX: CPT | Mod: CPTII,S$GLB,, | Performed by: STUDENT IN AN ORGANIZED HEALTH CARE EDUCATION/TRAINING PROGRAM

## 2021-09-21 PROCEDURE — 99999 PR PBB SHADOW E&M-EST. PATIENT-LVL IV: ICD-10-PCS | Mod: PBBFAC,,, | Performed by: STUDENT IN AN ORGANIZED HEALTH CARE EDUCATION/TRAINING PROGRAM

## 2021-09-21 PROCEDURE — 3072F LOW RISK FOR RETINOPATHY: CPT | Mod: CPTII,S$GLB,, | Performed by: STUDENT IN AN ORGANIZED HEALTH CARE EDUCATION/TRAINING PROGRAM

## 2021-09-21 PROCEDURE — 3046F PR MOST RECENT HEMOGLOBIN A1C LEVEL > 9.0%: ICD-10-PCS | Mod: CPTII,S$GLB,, | Performed by: STUDENT IN AN ORGANIZED HEALTH CARE EDUCATION/TRAINING PROGRAM

## 2021-09-21 PROCEDURE — 3078F PR MOST RECENT DIASTOLIC BLOOD PRESSURE < 80 MM HG: ICD-10-PCS | Mod: CPTII,S$GLB,, | Performed by: STUDENT IN AN ORGANIZED HEALTH CARE EDUCATION/TRAINING PROGRAM

## 2021-09-21 PROCEDURE — 3061F PR NEG MICROALBUMINURIA RESULT DOCUMENTED/REVIEW: ICD-10-PCS | Mod: CPTII,S$GLB,, | Performed by: STUDENT IN AN ORGANIZED HEALTH CARE EDUCATION/TRAINING PROGRAM

## 2021-09-21 PROCEDURE — 3074F SYST BP LT 130 MM HG: CPT | Mod: CPTII,S$GLB,, | Performed by: STUDENT IN AN ORGANIZED HEALTH CARE EDUCATION/TRAINING PROGRAM

## 2021-09-21 PROCEDURE — 3074F PR MOST RECENT SYSTOLIC BLOOD PRESSURE < 130 MM HG: ICD-10-PCS | Mod: CPTII,S$GLB,, | Performed by: STUDENT IN AN ORGANIZED HEALTH CARE EDUCATION/TRAINING PROGRAM

## 2021-09-21 PROCEDURE — 1159F MED LIST DOCD IN RCRD: CPT | Mod: CPTII,S$GLB,, | Performed by: STUDENT IN AN ORGANIZED HEALTH CARE EDUCATION/TRAINING PROGRAM

## 2021-09-21 PROCEDURE — 99214 PR OFFICE/OUTPT VISIT, EST, LEVL IV, 30-39 MIN: ICD-10-PCS | Mod: S$GLB,,, | Performed by: STUDENT IN AN ORGANIZED HEALTH CARE EDUCATION/TRAINING PROGRAM

## 2021-09-21 PROCEDURE — 1159F PR MEDICATION LIST DOCUMENTED IN MEDICAL RECORD: ICD-10-PCS | Mod: CPTII,S$GLB,, | Performed by: STUDENT IN AN ORGANIZED HEALTH CARE EDUCATION/TRAINING PROGRAM

## 2021-09-21 PROCEDURE — 3061F NEG MICROALBUMINURIA REV: CPT | Mod: CPTII,S$GLB,, | Performed by: STUDENT IN AN ORGANIZED HEALTH CARE EDUCATION/TRAINING PROGRAM

## 2021-09-21 PROCEDURE — 1160F RVW MEDS BY RX/DR IN RCRD: CPT | Mod: CPTII,S$GLB,, | Performed by: STUDENT IN AN ORGANIZED HEALTH CARE EDUCATION/TRAINING PROGRAM

## 2021-09-21 PROCEDURE — 3066F PR DOCUMENTATION OF TREATMENT FOR NEPHROPATHY: ICD-10-PCS | Mod: CPTII,S$GLB,, | Performed by: STUDENT IN AN ORGANIZED HEALTH CARE EDUCATION/TRAINING PROGRAM

## 2021-09-21 PROCEDURE — 99214 OFFICE O/P EST MOD 30 MIN: CPT | Mod: S$GLB,,, | Performed by: STUDENT IN AN ORGANIZED HEALTH CARE EDUCATION/TRAINING PROGRAM

## 2021-09-21 PROCEDURE — 99999 PR PBB SHADOW E&M-EST. PATIENT-LVL IV: CPT | Mod: PBBFAC,,, | Performed by: STUDENT IN AN ORGANIZED HEALTH CARE EDUCATION/TRAINING PROGRAM

## 2021-09-21 PROCEDURE — 3008F PR BODY MASS INDEX (BMI) DOCUMENTED: ICD-10-PCS | Mod: CPTII,S$GLB,, | Performed by: STUDENT IN AN ORGANIZED HEALTH CARE EDUCATION/TRAINING PROGRAM

## 2021-09-21 PROCEDURE — 3046F HEMOGLOBIN A1C LEVEL >9.0%: CPT | Mod: CPTII,S$GLB,, | Performed by: STUDENT IN AN ORGANIZED HEALTH CARE EDUCATION/TRAINING PROGRAM

## 2021-09-21 PROCEDURE — 3078F DIAST BP <80 MM HG: CPT | Mod: CPTII,S$GLB,, | Performed by: STUDENT IN AN ORGANIZED HEALTH CARE EDUCATION/TRAINING PROGRAM

## 2021-09-21 PROCEDURE — 3008F BODY MASS INDEX DOCD: CPT | Mod: CPTII,S$GLB,, | Performed by: STUDENT IN AN ORGANIZED HEALTH CARE EDUCATION/TRAINING PROGRAM

## 2021-09-21 PROCEDURE — 1160F PR REVIEW ALL MEDS BY PRESCRIBER/CLIN PHARMACIST DOCUMENTED: ICD-10-PCS | Mod: CPTII,S$GLB,, | Performed by: STUDENT IN AN ORGANIZED HEALTH CARE EDUCATION/TRAINING PROGRAM

## 2021-09-21 PROCEDURE — 3072F PR LOW RISK FOR RETINOPATHY: ICD-10-PCS | Mod: CPTII,S$GLB,, | Performed by: STUDENT IN AN ORGANIZED HEALTH CARE EDUCATION/TRAINING PROGRAM

## 2021-09-21 RX ORDER — METHOCARBAMOL 750 MG/1
TABLET, FILM COATED ORAL
Qty: 62 TABLET | Refills: 0 | Status: SHIPPED | OUTPATIENT
Start: 2021-09-21 | End: 2021-10-05

## 2021-09-21 RX ORDER — TRAMADOL HYDROCHLORIDE 50 MG/1
50-100 TABLET ORAL EVERY 6 HOURS PRN
Qty: 30 EACH | Refills: 1 | Status: ON HOLD | OUTPATIENT
Start: 2021-09-21 | End: 2021-11-02

## 2021-09-22 ENCOUNTER — PATIENT OUTREACH (OUTPATIENT)
Dept: ADMINISTRATIVE | Facility: OTHER | Age: 66
End: 2021-09-22

## 2021-09-24 ENCOUNTER — OFFICE VISIT (OUTPATIENT)
Dept: SURGERY | Facility: CLINIC | Age: 66
End: 2021-09-24
Payer: COMMERCIAL

## 2021-09-24 VITALS
WEIGHT: 256.06 LBS | HEART RATE: 70 BPM | HEIGHT: 72 IN | SYSTOLIC BLOOD PRESSURE: 147 MMHG | BODY MASS INDEX: 34.68 KG/M2 | DIASTOLIC BLOOD PRESSURE: 82 MMHG

## 2021-09-24 DIAGNOSIS — R10.31 PAIN, ABDOMINAL, RLQ: ICD-10-CM

## 2021-09-24 DIAGNOSIS — K40.90 NON-RECURRENT UNILATERAL INGUINAL HERNIA WITHOUT OBSTRUCTION OR GANGRENE: Primary | ICD-10-CM

## 2021-09-24 PROCEDURE — 3079F DIAST BP 80-89 MM HG: CPT | Mod: CPTII,S$GLB,, | Performed by: STUDENT IN AN ORGANIZED HEALTH CARE EDUCATION/TRAINING PROGRAM

## 2021-09-24 PROCEDURE — 1160F RVW MEDS BY RX/DR IN RCRD: CPT | Mod: CPTII,S$GLB,, | Performed by: STUDENT IN AN ORGANIZED HEALTH CARE EDUCATION/TRAINING PROGRAM

## 2021-09-24 PROCEDURE — 3046F HEMOGLOBIN A1C LEVEL >9.0%: CPT | Mod: CPTII,S$GLB,, | Performed by: STUDENT IN AN ORGANIZED HEALTH CARE EDUCATION/TRAINING PROGRAM

## 2021-09-24 PROCEDURE — 3072F LOW RISK FOR RETINOPATHY: CPT | Mod: CPTII,S$GLB,, | Performed by: STUDENT IN AN ORGANIZED HEALTH CARE EDUCATION/TRAINING PROGRAM

## 2021-09-24 PROCEDURE — 3077F PR MOST RECENT SYSTOLIC BLOOD PRESSURE >= 140 MM HG: ICD-10-PCS | Mod: CPTII,S$GLB,, | Performed by: STUDENT IN AN ORGANIZED HEALTH CARE EDUCATION/TRAINING PROGRAM

## 2021-09-24 PROCEDURE — 1159F MED LIST DOCD IN RCRD: CPT | Mod: CPTII,S$GLB,, | Performed by: STUDENT IN AN ORGANIZED HEALTH CARE EDUCATION/TRAINING PROGRAM

## 2021-09-24 PROCEDURE — 1160F PR REVIEW ALL MEDS BY PRESCRIBER/CLIN PHARMACIST DOCUMENTED: ICD-10-PCS | Mod: CPTII,S$GLB,, | Performed by: STUDENT IN AN ORGANIZED HEALTH CARE EDUCATION/TRAINING PROGRAM

## 2021-09-24 PROCEDURE — 99999 PR PBB SHADOW E&M-EST. PATIENT-LVL V: ICD-10-PCS | Mod: PBBFAC,,, | Performed by: STUDENT IN AN ORGANIZED HEALTH CARE EDUCATION/TRAINING PROGRAM

## 2021-09-24 PROCEDURE — 3072F PR LOW RISK FOR RETINOPATHY: ICD-10-PCS | Mod: CPTII,S$GLB,, | Performed by: STUDENT IN AN ORGANIZED HEALTH CARE EDUCATION/TRAINING PROGRAM

## 2021-09-24 PROCEDURE — 3079F PR MOST RECENT DIASTOLIC BLOOD PRESSURE 80-89 MM HG: ICD-10-PCS | Mod: CPTII,S$GLB,, | Performed by: STUDENT IN AN ORGANIZED HEALTH CARE EDUCATION/TRAINING PROGRAM

## 2021-09-24 PROCEDURE — 1159F PR MEDICATION LIST DOCUMENTED IN MEDICAL RECORD: ICD-10-PCS | Mod: CPTII,S$GLB,, | Performed by: STUDENT IN AN ORGANIZED HEALTH CARE EDUCATION/TRAINING PROGRAM

## 2021-09-24 PROCEDURE — 3008F PR BODY MASS INDEX (BMI) DOCUMENTED: ICD-10-PCS | Mod: CPTII,S$GLB,, | Performed by: STUDENT IN AN ORGANIZED HEALTH CARE EDUCATION/TRAINING PROGRAM

## 2021-09-24 PROCEDURE — 99999 PR PBB SHADOW E&M-EST. PATIENT-LVL V: CPT | Mod: PBBFAC,,, | Performed by: STUDENT IN AN ORGANIZED HEALTH CARE EDUCATION/TRAINING PROGRAM

## 2021-09-24 PROCEDURE — 4010F ACE/ARB THERAPY RXD/TAKEN: CPT | Mod: CPTII,S$GLB,, | Performed by: STUDENT IN AN ORGANIZED HEALTH CARE EDUCATION/TRAINING PROGRAM

## 2021-09-24 PROCEDURE — 3008F BODY MASS INDEX DOCD: CPT | Mod: CPTII,S$GLB,, | Performed by: STUDENT IN AN ORGANIZED HEALTH CARE EDUCATION/TRAINING PROGRAM

## 2021-09-24 PROCEDURE — 3066F NEPHROPATHY DOC TX: CPT | Mod: CPTII,S$GLB,, | Performed by: STUDENT IN AN ORGANIZED HEALTH CARE EDUCATION/TRAINING PROGRAM

## 2021-09-24 PROCEDURE — 3046F PR MOST RECENT HEMOGLOBIN A1C LEVEL > 9.0%: ICD-10-PCS | Mod: CPTII,S$GLB,, | Performed by: STUDENT IN AN ORGANIZED HEALTH CARE EDUCATION/TRAINING PROGRAM

## 2021-09-24 PROCEDURE — 3066F PR DOCUMENTATION OF TREATMENT FOR NEPHROPATHY: ICD-10-PCS | Mod: CPTII,S$GLB,, | Performed by: STUDENT IN AN ORGANIZED HEALTH CARE EDUCATION/TRAINING PROGRAM

## 2021-09-24 PROCEDURE — 99204 OFFICE O/P NEW MOD 45 MIN: CPT | Mod: S$GLB,,, | Performed by: STUDENT IN AN ORGANIZED HEALTH CARE EDUCATION/TRAINING PROGRAM

## 2021-09-24 PROCEDURE — 3061F PR NEG MICROALBUMINURIA RESULT DOCUMENTED/REVIEW: ICD-10-PCS | Mod: CPTII,S$GLB,, | Performed by: STUDENT IN AN ORGANIZED HEALTH CARE EDUCATION/TRAINING PROGRAM

## 2021-09-24 PROCEDURE — 3077F SYST BP >= 140 MM HG: CPT | Mod: CPTII,S$GLB,, | Performed by: STUDENT IN AN ORGANIZED HEALTH CARE EDUCATION/TRAINING PROGRAM

## 2021-09-24 PROCEDURE — 99204 PR OFFICE/OUTPT VISIT, NEW, LEVL IV, 45-59 MIN: ICD-10-PCS | Mod: S$GLB,,, | Performed by: STUDENT IN AN ORGANIZED HEALTH CARE EDUCATION/TRAINING PROGRAM

## 2021-09-24 PROCEDURE — 3061F NEG MICROALBUMINURIA REV: CPT | Mod: CPTII,S$GLB,, | Performed by: STUDENT IN AN ORGANIZED HEALTH CARE EDUCATION/TRAINING PROGRAM

## 2021-09-24 PROCEDURE — 4010F PR ACE/ARB THEARPY RXD/TAKEN: ICD-10-PCS | Mod: CPTII,S$GLB,, | Performed by: STUDENT IN AN ORGANIZED HEALTH CARE EDUCATION/TRAINING PROGRAM

## 2021-09-27 ENCOUNTER — LAB VISIT (OUTPATIENT)
Dept: FAMILY MEDICINE | Facility: CLINIC | Age: 66
End: 2021-09-27
Payer: COMMERCIAL

## 2021-09-27 DIAGNOSIS — K40.90 NON-RECURRENT UNILATERAL INGUINAL HERNIA WITHOUT OBSTRUCTION OR GANGRENE: ICD-10-CM

## 2021-09-27 DIAGNOSIS — R10.31 PAIN, ABDOMINAL, RLQ: ICD-10-CM

## 2021-09-27 LAB
SARS-COV-2 RNA RESP QL NAA+PROBE: NOT DETECTED
SARS-COV-2- CYCLE NUMBER: NORMAL

## 2021-09-27 PROCEDURE — U0003 INFECTIOUS AGENT DETECTION BY NUCLEIC ACID (DNA OR RNA); SEVERE ACUTE RESPIRATORY SYNDROME CORONAVIRUS 2 (SARS-COV-2) (CORONAVIRUS DISEASE [COVID-19]), AMPLIFIED PROBE TECHNIQUE, MAKING USE OF HIGH THROUGHPUT TECHNOLOGIES AS DESCRIBED BY CMS-2020-01-R: HCPCS | Performed by: STUDENT IN AN ORGANIZED HEALTH CARE EDUCATION/TRAINING PROGRAM

## 2021-09-27 PROCEDURE — U0005 INFEC AGEN DETEC AMPLI PROBE: HCPCS | Performed by: STUDENT IN AN ORGANIZED HEALTH CARE EDUCATION/TRAINING PROGRAM

## 2021-09-28 ENCOUNTER — TELEPHONE (OUTPATIENT)
Dept: SURGERY | Facility: CLINIC | Age: 66
End: 2021-09-28

## 2021-09-29 ENCOUNTER — ANESTHESIA EVENT (OUTPATIENT)
Dept: SURGERY | Facility: HOSPITAL | Age: 66
End: 2021-09-29
Payer: COMMERCIAL

## 2021-09-30 ENCOUNTER — HOSPITAL ENCOUNTER (OUTPATIENT)
Facility: HOSPITAL | Age: 66
Discharge: HOME | End: 2021-09-30
Attending: STUDENT IN AN ORGANIZED HEALTH CARE EDUCATION/TRAINING PROGRAM | Admitting: STUDENT IN AN ORGANIZED HEALTH CARE EDUCATION/TRAINING PROGRAM
Payer: COMMERCIAL

## 2021-09-30 ENCOUNTER — ANESTHESIA (OUTPATIENT)
Dept: SURGERY | Facility: HOSPITAL | Age: 66
End: 2021-09-30
Payer: COMMERCIAL

## 2021-09-30 VITALS
HEIGHT: 72 IN | WEIGHT: 256.19 LBS | OXYGEN SATURATION: 95 % | RESPIRATION RATE: 17 BRPM | HEART RATE: 74 BPM | DIASTOLIC BLOOD PRESSURE: 76 MMHG | TEMPERATURE: 98 F | SYSTOLIC BLOOD PRESSURE: 130 MMHG | BODY MASS INDEX: 34.7 KG/M2

## 2021-09-30 DIAGNOSIS — K40.90 NON-RECURRENT UNILATERAL INGUINAL HERNIA WITHOUT OBSTRUCTION OR GANGRENE: Primary | ICD-10-CM

## 2021-09-30 DIAGNOSIS — I10 HYPERTENSION: ICD-10-CM

## 2021-09-30 PROBLEM — R19.09: Status: ACTIVE | Noted: 2021-09-30

## 2021-09-30 LAB
ANION GAP SERPL CALC-SCNC: 8 MMOL/L (ref 8–16)
BUN SERPL-MCNC: 18 MG/DL (ref 8–23)
CALCIUM SERPL-MCNC: 8.7 MG/DL (ref 8.7–10.5)
CHLORIDE SERPL-SCNC: 105 MMOL/L (ref 95–110)
CO2 SERPL-SCNC: 25 MMOL/L (ref 23–29)
CREAT SERPL-MCNC: 0.9 MG/DL (ref 0.5–1.4)
EST. GFR  (AFRICAN AMERICAN): >60 ML/MIN/1.73 M^2
EST. GFR  (NON AFRICAN AMERICAN): >60 ML/MIN/1.73 M^2
GLUCOSE SERPL-MCNC: 144 MG/DL (ref 70–110)
POCT GLUCOSE: 139 MG/DL (ref 70–110)
POTASSIUM SERPL-SCNC: 3.8 MMOL/L (ref 3.5–5.1)
SODIUM SERPL-SCNC: 138 MMOL/L (ref 136–145)

## 2021-09-30 PROCEDURE — 25000003 PHARM REV CODE 250: Performed by: NURSE ANESTHETIST, CERTIFIED REGISTERED

## 2021-09-30 PROCEDURE — 27201423 OPTIME MED/SURG SUP & DEVICES STERILE SUPPLY: Performed by: STUDENT IN AN ORGANIZED HEALTH CARE EDUCATION/TRAINING PROGRAM

## 2021-09-30 PROCEDURE — 71000039 HC RECOVERY, EACH ADD'L HOUR: Performed by: STUDENT IN AN ORGANIZED HEALTH CARE EDUCATION/TRAINING PROGRAM

## 2021-09-30 PROCEDURE — 93010 ELECTROCARDIOGRAM REPORT: CPT | Mod: ,,, | Performed by: INTERNAL MEDICINE

## 2021-09-30 PROCEDURE — 37000009 HC ANESTHESIA EA ADD 15 MINS: Performed by: STUDENT IN AN ORGANIZED HEALTH CARE EDUCATION/TRAINING PROGRAM

## 2021-09-30 PROCEDURE — 25000003 PHARM REV CODE 250: Performed by: STUDENT IN AN ORGANIZED HEALTH CARE EDUCATION/TRAINING PROGRAM

## 2021-09-30 PROCEDURE — 63600175 PHARM REV CODE 636 W HCPCS: Performed by: STUDENT IN AN ORGANIZED HEALTH CARE EDUCATION/TRAINING PROGRAM

## 2021-09-30 PROCEDURE — 93010 EKG 12-LEAD: ICD-10-PCS | Mod: ,,, | Performed by: INTERNAL MEDICINE

## 2021-09-30 PROCEDURE — 71000016 HC POSTOP RECOV ADDL HR: Performed by: STUDENT IN AN ORGANIZED HEALTH CARE EDUCATION/TRAINING PROGRAM

## 2021-09-30 PROCEDURE — 71000015 HC POSTOP RECOV 1ST HR: Performed by: STUDENT IN AN ORGANIZED HEALTH CARE EDUCATION/TRAINING PROGRAM

## 2021-09-30 PROCEDURE — 49650 PR LAP,INGUINAL HERNIA REPR,INITIAL: ICD-10-PCS | Mod: RT,,, | Performed by: STUDENT IN AN ORGANIZED HEALTH CARE EDUCATION/TRAINING PROGRAM

## 2021-09-30 PROCEDURE — 36000711: Performed by: STUDENT IN AN ORGANIZED HEALTH CARE EDUCATION/TRAINING PROGRAM

## 2021-09-30 PROCEDURE — 88307 PR  SURG PATH,LEVEL V: ICD-10-PCS | Mod: 26,,, | Performed by: PATHOLOGY

## 2021-09-30 PROCEDURE — 25000003 PHARM REV CODE 250: Performed by: ANESTHESIOLOGY

## 2021-09-30 PROCEDURE — 63600175 PHARM REV CODE 636 W HCPCS: Performed by: NURSE ANESTHETIST, CERTIFIED REGISTERED

## 2021-09-30 PROCEDURE — 80048 BASIC METABOLIC PNL TOTAL CA: CPT | Performed by: STUDENT IN AN ORGANIZED HEALTH CARE EDUCATION/TRAINING PROGRAM

## 2021-09-30 PROCEDURE — 88307 TISSUE EXAM BY PATHOLOGIST: CPT | Mod: 26,,, | Performed by: PATHOLOGY

## 2021-09-30 PROCEDURE — 36000710: Performed by: STUDENT IN AN ORGANIZED HEALTH CARE EDUCATION/TRAINING PROGRAM

## 2021-09-30 PROCEDURE — 93005 ELECTROCARDIOGRAM TRACING: CPT

## 2021-09-30 PROCEDURE — 71000033 HC RECOVERY, INTIAL HOUR: Performed by: STUDENT IN AN ORGANIZED HEALTH CARE EDUCATION/TRAINING PROGRAM

## 2021-09-30 PROCEDURE — 36415 COLL VENOUS BLD VENIPUNCTURE: CPT | Performed by: STUDENT IN AN ORGANIZED HEALTH CARE EDUCATION/TRAINING PROGRAM

## 2021-09-30 PROCEDURE — 88307 TISSUE EXAM BY PATHOLOGIST: CPT | Performed by: PATHOLOGY

## 2021-09-30 PROCEDURE — C1781 MESH (IMPLANTABLE): HCPCS | Performed by: STUDENT IN AN ORGANIZED HEALTH CARE EDUCATION/TRAINING PROGRAM

## 2021-09-30 PROCEDURE — 37000008 HC ANESTHESIA 1ST 15 MINUTES: Performed by: STUDENT IN AN ORGANIZED HEALTH CARE EDUCATION/TRAINING PROGRAM

## 2021-09-30 PROCEDURE — 49650 LAP ING HERNIA REPAIR INIT: CPT | Mod: RT,,, | Performed by: STUDENT IN AN ORGANIZED HEALTH CARE EDUCATION/TRAINING PROGRAM

## 2021-09-30 DEVICE — MESH PROGRIP LAP 10X15CM RIGHT: Type: IMPLANTABLE DEVICE | Site: ABDOMEN | Status: FUNCTIONAL

## 2021-09-30 RX ORDER — BUPIVACAINE HYDROCHLORIDE 5 MG/ML
INJECTION, SOLUTION PERINEURAL
Status: DISCONTINUED | OUTPATIENT
Start: 2021-09-30 | End: 2021-09-30 | Stop reason: HOSPADM

## 2021-09-30 RX ORDER — HYDROCODONE BITARTRATE AND ACETAMINOPHEN 5; 325 MG/1; MG/1
1 TABLET ORAL EVERY 4 HOURS PRN
Qty: 10 TABLET | Refills: 0 | Status: ON HOLD | OUTPATIENT
Start: 2021-09-30 | End: 2021-11-02

## 2021-09-30 RX ORDER — DIPHENHYDRAMINE HYDROCHLORIDE 50 MG/ML
12.5 INJECTION INTRAMUSCULAR; INTRAVENOUS EVERY 6 HOURS PRN
Status: DISCONTINUED | OUTPATIENT
Start: 2021-09-30 | End: 2021-09-30 | Stop reason: HOSPADM

## 2021-09-30 RX ORDER — PROPOFOL 10 MG/ML
VIAL (ML) INTRAVENOUS
Status: DISCONTINUED | OUTPATIENT
Start: 2021-09-30 | End: 2021-09-30

## 2021-09-30 RX ORDER — LIDOCAINE HCL/PF 100 MG/5ML
SYRINGE (ML) INTRAVENOUS
Status: DISCONTINUED | OUTPATIENT
Start: 2021-09-30 | End: 2021-09-30

## 2021-09-30 RX ORDER — MIDAZOLAM HYDROCHLORIDE 1 MG/ML
INJECTION INTRAMUSCULAR; INTRAVENOUS
Status: DISCONTINUED | OUTPATIENT
Start: 2021-09-30 | End: 2021-09-30

## 2021-09-30 RX ORDER — HYDROCODONE BITARTRATE AND ACETAMINOPHEN 5; 325 MG/1; MG/1
1 TABLET ORAL EVERY 4 HOURS PRN
Status: DISCONTINUED | OUTPATIENT
Start: 2021-09-30 | End: 2021-09-30 | Stop reason: HOSPADM

## 2021-09-30 RX ORDER — HYDROMORPHONE HYDROCHLORIDE 2 MG/ML
INJECTION, SOLUTION INTRAMUSCULAR; INTRAVENOUS; SUBCUTANEOUS
Status: DISCONTINUED | OUTPATIENT
Start: 2021-09-30 | End: 2021-09-30

## 2021-09-30 RX ORDER — SODIUM CHLORIDE 0.9 % (FLUSH) 0.9 %
3 SYRINGE (ML) INJECTION
Status: DISCONTINUED | OUTPATIENT
Start: 2021-09-30 | End: 2021-09-30 | Stop reason: HOSPADM

## 2021-09-30 RX ORDER — FENTANYL CITRATE 50 UG/ML
INJECTION, SOLUTION INTRAMUSCULAR; INTRAVENOUS
Status: DISCONTINUED | OUTPATIENT
Start: 2021-09-30 | End: 2021-09-30

## 2021-09-30 RX ORDER — ACETAMINOPHEN 10 MG/ML
INJECTION, SOLUTION INTRAVENOUS
Status: DISCONTINUED | OUTPATIENT
Start: 2021-09-30 | End: 2021-09-30

## 2021-09-30 RX ORDER — VECURONIUM BROMIDE FOR INJECTION 1 MG/ML
INJECTION, POWDER, LYOPHILIZED, FOR SOLUTION INTRAVENOUS
Status: DISCONTINUED | OUTPATIENT
Start: 2021-09-30 | End: 2021-09-30

## 2021-09-30 RX ORDER — AMOXICILLIN 250 MG
1 CAPSULE ORAL 2 TIMES DAILY
Status: ON HOLD | COMMUNITY
Start: 2021-09-30 | End: 2021-11-02

## 2021-09-30 RX ORDER — ONDANSETRON 2 MG/ML
4 INJECTION INTRAMUSCULAR; INTRAVENOUS DAILY PRN
Status: DISCONTINUED | OUTPATIENT
Start: 2021-09-30 | End: 2021-09-30 | Stop reason: HOSPADM

## 2021-09-30 RX ORDER — DEXAMETHASONE SODIUM PHOSPHATE 4 MG/ML
INJECTION, SOLUTION INTRA-ARTICULAR; INTRALESIONAL; INTRAMUSCULAR; INTRAVENOUS; SOFT TISSUE
Status: DISCONTINUED | OUTPATIENT
Start: 2021-09-30 | End: 2021-09-30

## 2021-09-30 RX ORDER — ROCURONIUM BROMIDE 10 MG/ML
INJECTION, SOLUTION INTRAVENOUS
Status: DISCONTINUED | OUTPATIENT
Start: 2021-09-30 | End: 2021-09-30

## 2021-09-30 RX ORDER — HYDROMORPHONE HYDROCHLORIDE 2 MG/ML
0.5 INJECTION, SOLUTION INTRAMUSCULAR; INTRAVENOUS; SUBCUTANEOUS EVERY 5 MIN PRN
Status: DISCONTINUED | OUTPATIENT
Start: 2021-09-30 | End: 2021-09-30 | Stop reason: HOSPADM

## 2021-09-30 RX ORDER — CEFAZOLIN SODIUM 2 G/50ML
2 SOLUTION INTRAVENOUS
Status: COMPLETED | OUTPATIENT
Start: 2021-09-30 | End: 2021-09-30

## 2021-09-30 RX ORDER — SCOLOPAMINE TRANSDERMAL SYSTEM 1 MG/1
1 PATCH, EXTENDED RELEASE TRANSDERMAL
Status: DISCONTINUED | OUTPATIENT
Start: 2021-09-30 | End: 2021-09-30 | Stop reason: HOSPADM

## 2021-09-30 RX ADMIN — SODIUM CHLORIDE, SODIUM LACTATE, POTASSIUM CHLORIDE, AND CALCIUM CHLORIDE: .6; .31; .03; .02 INJECTION, SOLUTION INTRAVENOUS at 06:09

## 2021-09-30 RX ADMIN — FENTANYL CITRATE 50 MCG: 50 INJECTION, SOLUTION INTRAMUSCULAR; INTRAVENOUS at 07:09

## 2021-09-30 RX ADMIN — MIDAZOLAM HYDROCHLORIDE 2 MG: 1 INJECTION, SOLUTION INTRAMUSCULAR; INTRAVENOUS at 06:09

## 2021-09-30 RX ADMIN — SUGAMMADEX 232 MG: 100 INJECTION, SOLUTION INTRAVENOUS at 09:09

## 2021-09-30 RX ADMIN — FENTANYL CITRATE 150 MCG: 50 INJECTION, SOLUTION INTRAMUSCULAR; INTRAVENOUS at 07:09

## 2021-09-30 RX ADMIN — ACETAMINOPHEN 1000 MG: 10 INJECTION, SOLUTION INTRAVENOUS at 07:09

## 2021-09-30 RX ADMIN — SCOPALAMINE 1 PATCH: 1 PATCH, EXTENDED RELEASE TRANSDERMAL at 06:09

## 2021-09-30 RX ADMIN — ROCURONIUM BROMIDE 50 MG: 10 INJECTION, SOLUTION INTRAVENOUS at 07:09

## 2021-09-30 RX ADMIN — VECURONIUM BROMIDE 3 MG: 10 INJECTION, POWDER, LYOPHILIZED, FOR SOLUTION INTRAVENOUS at 08:09

## 2021-09-30 RX ADMIN — CEFAZOLIN SODIUM 2 G: 2 SOLUTION INTRAVENOUS at 06:09

## 2021-09-30 RX ADMIN — PROPOFOL 150 MG: 10 INJECTION, EMULSION INTRAVENOUS at 07:09

## 2021-09-30 RX ADMIN — PROPOFOL 100 MG: 10 INJECTION, EMULSION INTRAVENOUS at 09:09

## 2021-09-30 RX ADMIN — FENTANYL CITRATE 50 MCG: 50 INJECTION, SOLUTION INTRAMUSCULAR; INTRAVENOUS at 08:09

## 2021-09-30 RX ADMIN — VECURONIUM BROMIDE 4 MG: 10 INJECTION, POWDER, LYOPHILIZED, FOR SOLUTION INTRAVENOUS at 07:09

## 2021-09-30 RX ADMIN — HYDROMORPHONE HYDROCHLORIDE 0.6 MG: 2 INJECTION INTRAMUSCULAR; INTRAVENOUS; SUBCUTANEOUS at 09:09

## 2021-09-30 RX ADMIN — LIDOCAINE HYDROCHLORIDE 75 MG: 20 INJECTION, SOLUTION INTRAVENOUS at 07:09

## 2021-09-30 RX ADMIN — DEXAMETHASONE SODIUM PHOSPHATE 8 MG: 4 INJECTION, SOLUTION INTRA-ARTICULAR; INTRALESIONAL; INTRAMUSCULAR; INTRAVENOUS; SOFT TISSUE at 07:09

## 2021-09-30 RX ADMIN — HYDROCODONE BITARTRATE AND ACETAMINOPHEN 1 TABLET: 5; 325 TABLET ORAL at 11:09

## 2021-10-04 ENCOUNTER — PATIENT MESSAGE (OUTPATIENT)
Dept: ADMINISTRATIVE | Facility: HOSPITAL | Age: 66
End: 2021-10-04

## 2021-10-04 LAB
FINAL PATHOLOGIC DIAGNOSIS: NORMAL
GROSS: NORMAL
Lab: NORMAL

## 2021-10-06 ENCOUNTER — TELEPHONE (OUTPATIENT)
Dept: SURGERY | Facility: CLINIC | Age: 66
End: 2021-10-06

## 2021-10-06 ENCOUNTER — OFFICE VISIT (OUTPATIENT)
Dept: SURGERY | Facility: CLINIC | Age: 66
End: 2021-10-06
Payer: COMMERCIAL

## 2021-10-06 ENCOUNTER — HOSPITAL ENCOUNTER (OUTPATIENT)
Dept: RADIOLOGY | Facility: HOSPITAL | Age: 66
Discharge: HOME | End: 2021-10-06
Attending: STUDENT IN AN ORGANIZED HEALTH CARE EDUCATION/TRAINING PROGRAM
Payer: COMMERCIAL

## 2021-10-06 VITALS
SYSTOLIC BLOOD PRESSURE: 127 MMHG | HEART RATE: 100 BPM | BODY MASS INDEX: 34.74 KG/M2 | HEIGHT: 72 IN | DIASTOLIC BLOOD PRESSURE: 74 MMHG

## 2021-10-06 DIAGNOSIS — M79.672 LEFT FOOT PAIN: Primary | ICD-10-CM

## 2021-10-06 DIAGNOSIS — M79.89 LEFT LEG SWELLING: Primary | ICD-10-CM

## 2021-10-06 DIAGNOSIS — M79.89 LEFT LEG SWELLING: ICD-10-CM

## 2021-10-06 PROCEDURE — 99024 POSTOP FOLLOW-UP VISIT: CPT | Mod: S$GLB,,, | Performed by: STUDENT IN AN ORGANIZED HEALTH CARE EDUCATION/TRAINING PROGRAM

## 2021-10-06 PROCEDURE — 3078F PR MOST RECENT DIASTOLIC BLOOD PRESSURE < 80 MM HG: ICD-10-PCS | Mod: CPTII,S$GLB,, | Performed by: STUDENT IN AN ORGANIZED HEALTH CARE EDUCATION/TRAINING PROGRAM

## 2021-10-06 PROCEDURE — 99999 PR PBB SHADOW E&M-EST. PATIENT-LVL IV: ICD-10-PCS | Mod: PBBFAC,,, | Performed by: STUDENT IN AN ORGANIZED HEALTH CARE EDUCATION/TRAINING PROGRAM

## 2021-10-06 PROCEDURE — 3072F PR LOW RISK FOR RETINOPATHY: ICD-10-PCS | Mod: CPTII,S$GLB,, | Performed by: STUDENT IN AN ORGANIZED HEALTH CARE EDUCATION/TRAINING PROGRAM

## 2021-10-06 PROCEDURE — 93971 EXTREMITY STUDY: CPT | Mod: TC,LT

## 2021-10-06 PROCEDURE — 3008F PR BODY MASS INDEX (BMI) DOCUMENTED: ICD-10-PCS | Mod: CPTII,S$GLB,, | Performed by: STUDENT IN AN ORGANIZED HEALTH CARE EDUCATION/TRAINING PROGRAM

## 2021-10-06 PROCEDURE — 3008F BODY MASS INDEX DOCD: CPT | Mod: CPTII,S$GLB,, | Performed by: STUDENT IN AN ORGANIZED HEALTH CARE EDUCATION/TRAINING PROGRAM

## 2021-10-06 PROCEDURE — 4010F ACE/ARB THERAPY RXD/TAKEN: CPT | Mod: CPTII,S$GLB,, | Performed by: STUDENT IN AN ORGANIZED HEALTH CARE EDUCATION/TRAINING PROGRAM

## 2021-10-06 PROCEDURE — 99024 PR POST-OP FOLLOW-UP VISIT: ICD-10-PCS | Mod: S$GLB,,, | Performed by: STUDENT IN AN ORGANIZED HEALTH CARE EDUCATION/TRAINING PROGRAM

## 2021-10-06 PROCEDURE — 3066F NEPHROPATHY DOC TX: CPT | Mod: CPTII,S$GLB,, | Performed by: STUDENT IN AN ORGANIZED HEALTH CARE EDUCATION/TRAINING PROGRAM

## 2021-10-06 PROCEDURE — 3046F PR MOST RECENT HEMOGLOBIN A1C LEVEL > 9.0%: ICD-10-PCS | Mod: CPTII,S$GLB,, | Performed by: STUDENT IN AN ORGANIZED HEALTH CARE EDUCATION/TRAINING PROGRAM

## 2021-10-06 PROCEDURE — 3046F HEMOGLOBIN A1C LEVEL >9.0%: CPT | Mod: CPTII,S$GLB,, | Performed by: STUDENT IN AN ORGANIZED HEALTH CARE EDUCATION/TRAINING PROGRAM

## 2021-10-06 PROCEDURE — 3061F NEG MICROALBUMINURIA REV: CPT | Mod: CPTII,S$GLB,, | Performed by: STUDENT IN AN ORGANIZED HEALTH CARE EDUCATION/TRAINING PROGRAM

## 2021-10-06 PROCEDURE — 93971 EXTREMITY STUDY: CPT | Mod: 26,LT,, | Performed by: RADIOLOGY

## 2021-10-06 PROCEDURE — 1159F MED LIST DOCD IN RCRD: CPT | Mod: CPTII,S$GLB,, | Performed by: STUDENT IN AN ORGANIZED HEALTH CARE EDUCATION/TRAINING PROGRAM

## 2021-10-06 PROCEDURE — 1160F RVW MEDS BY RX/DR IN RCRD: CPT | Mod: CPTII,S$GLB,, | Performed by: STUDENT IN AN ORGANIZED HEALTH CARE EDUCATION/TRAINING PROGRAM

## 2021-10-06 PROCEDURE — 3072F LOW RISK FOR RETINOPATHY: CPT | Mod: CPTII,S$GLB,, | Performed by: STUDENT IN AN ORGANIZED HEALTH CARE EDUCATION/TRAINING PROGRAM

## 2021-10-06 PROCEDURE — 3066F PR DOCUMENTATION OF TREATMENT FOR NEPHROPATHY: ICD-10-PCS | Mod: CPTII,S$GLB,, | Performed by: STUDENT IN AN ORGANIZED HEALTH CARE EDUCATION/TRAINING PROGRAM

## 2021-10-06 PROCEDURE — 4010F PR ACE/ARB THEARPY RXD/TAKEN: ICD-10-PCS | Mod: CPTII,S$GLB,, | Performed by: STUDENT IN AN ORGANIZED HEALTH CARE EDUCATION/TRAINING PROGRAM

## 2021-10-06 PROCEDURE — 3074F SYST BP LT 130 MM HG: CPT | Mod: CPTII,S$GLB,, | Performed by: STUDENT IN AN ORGANIZED HEALTH CARE EDUCATION/TRAINING PROGRAM

## 2021-10-06 PROCEDURE — 99999 PR PBB SHADOW E&M-EST. PATIENT-LVL IV: CPT | Mod: PBBFAC,,, | Performed by: STUDENT IN AN ORGANIZED HEALTH CARE EDUCATION/TRAINING PROGRAM

## 2021-10-06 PROCEDURE — 3078F DIAST BP <80 MM HG: CPT | Mod: CPTII,S$GLB,, | Performed by: STUDENT IN AN ORGANIZED HEALTH CARE EDUCATION/TRAINING PROGRAM

## 2021-10-06 PROCEDURE — 93971 US LOWER EXTREMITY VEINS LEFT: ICD-10-PCS | Mod: 26,LT,, | Performed by: RADIOLOGY

## 2021-10-06 PROCEDURE — 3074F PR MOST RECENT SYSTOLIC BLOOD PRESSURE < 130 MM HG: ICD-10-PCS | Mod: CPTII,S$GLB,, | Performed by: STUDENT IN AN ORGANIZED HEALTH CARE EDUCATION/TRAINING PROGRAM

## 2021-10-06 PROCEDURE — 1160F PR REVIEW ALL MEDS BY PRESCRIBER/CLIN PHARMACIST DOCUMENTED: ICD-10-PCS | Mod: CPTII,S$GLB,, | Performed by: STUDENT IN AN ORGANIZED HEALTH CARE EDUCATION/TRAINING PROGRAM

## 2021-10-06 PROCEDURE — 3061F PR NEG MICROALBUMINURIA RESULT DOCUMENTED/REVIEW: ICD-10-PCS | Mod: CPTII,S$GLB,, | Performed by: STUDENT IN AN ORGANIZED HEALTH CARE EDUCATION/TRAINING PROGRAM

## 2021-10-06 PROCEDURE — 1159F PR MEDICATION LIST DOCUMENTED IN MEDICAL RECORD: ICD-10-PCS | Mod: CPTII,S$GLB,, | Performed by: STUDENT IN AN ORGANIZED HEALTH CARE EDUCATION/TRAINING PROGRAM

## 2021-10-06 RX ORDER — KETOROLAC TROMETHAMINE 10 MG/1
10 TABLET, FILM COATED ORAL EVERY 6 HOURS
Qty: 20 TABLET | Refills: 0 | Status: SHIPPED | OUTPATIENT
Start: 2021-10-06 | End: 2021-10-11

## 2021-11-01 ENCOUNTER — PATIENT OUTREACH (OUTPATIENT)
Dept: ADMINISTRATIVE | Facility: OTHER | Age: 66
End: 2021-11-01
Payer: COMMERCIAL

## 2021-11-01 ENCOUNTER — TELEPHONE (OUTPATIENT)
Dept: PODIATRY | Facility: CLINIC | Age: 66
End: 2021-11-01
Payer: COMMERCIAL

## 2021-11-01 PROBLEM — L08.9 DIABETIC FOOT INFECTION: Status: ACTIVE | Noted: 2021-11-01

## 2021-11-01 PROBLEM — E11.628 DIABETIC FOOT INFECTION: Status: ACTIVE | Noted: 2021-11-01

## 2021-11-03 PROBLEM — I70.203 ATHEROSCLEROSIS OF ARTERY OF BOTH LOWER EXTREMITIES: Status: ACTIVE | Noted: 2021-11-03

## 2021-11-03 PROBLEM — I96 NECROTIC TOES: Status: ACTIVE | Noted: 2021-11-03

## 2021-11-04 PROBLEM — I70.263: Status: ACTIVE | Noted: 2021-11-04

## 2021-11-05 PROBLEM — I70.203 ATHEROSCLEROSIS OF ARTERY OF BOTH LOWER EXTREMITIES: Status: RESOLVED | Noted: 2021-11-03 | Resolved: 2021-11-05

## 2021-11-09 PROBLEM — Z79.4 TYPE 2 DIABETES MELLITUS WITH FOOT ULCER, WITH LONG-TERM CURRENT USE OF INSULIN: Status: ACTIVE | Noted: 2021-11-09

## 2021-11-09 PROBLEM — E11.69 HYPERLIPIDEMIA ASSOCIATED WITH TYPE 2 DIABETES MELLITUS: Status: ACTIVE | Noted: 2020-06-24

## 2021-11-09 PROBLEM — E11.52 TYPE 2 DIABETES MELLITUS WITH DIABETIC PERIPHERAL ANGIOPATHY AND GANGRENE, WITHOUT LONG-TERM CURRENT USE OF INSULIN: Status: ACTIVE | Noted: 2018-08-14

## 2021-11-09 PROBLEM — R79.89 LOW TESTOSTERONE: Status: RESOLVED | Noted: 2018-05-29 | Resolved: 2021-11-09

## 2021-11-09 PROBLEM — Z12.11 SCREEN FOR COLON CANCER: Status: RESOLVED | Noted: 2020-09-25 | Resolved: 2021-11-09

## 2021-11-09 PROBLEM — L97.509 TYPE 2 DIABETES MELLITUS WITH FOOT ULCER, WITH LONG-TERM CURRENT USE OF INSULIN: Status: ACTIVE | Noted: 2021-11-09

## 2021-11-09 PROBLEM — R53.83 FATIGUE: Status: RESOLVED | Noted: 2020-11-24 | Resolved: 2021-11-09

## 2021-11-09 PROBLEM — R74.01 TRANSAMINITIS: Status: RESOLVED | Noted: 2020-06-24 | Resolved: 2021-11-09

## 2021-11-09 PROBLEM — E11.621 TYPE 2 DIABETES MELLITUS WITH FOOT ULCER, WITH LONG-TERM CURRENT USE OF INSULIN: Status: ACTIVE | Noted: 2021-11-09

## 2021-11-09 PROBLEM — I96 NECROTIC TOES: Status: RESOLVED | Noted: 2021-11-03 | Resolved: 2021-11-09

## 2021-11-10 ENCOUNTER — OFFICE VISIT (OUTPATIENT)
Dept: FAMILY MEDICINE | Facility: CLINIC | Age: 66
End: 2021-11-10
Payer: COMMERCIAL

## 2021-11-10 VITALS
HEART RATE: 75 BPM | OXYGEN SATURATION: 95 % | WEIGHT: 257.44 LBS | HEIGHT: 73 IN | BODY MASS INDEX: 34.12 KG/M2 | DIASTOLIC BLOOD PRESSURE: 80 MMHG | SYSTOLIC BLOOD PRESSURE: 122 MMHG | RESPIRATION RATE: 18 BRPM

## 2021-11-10 DIAGNOSIS — R74.01 TRANSAMINITIS: ICD-10-CM

## 2021-11-10 DIAGNOSIS — E11.621 TYPE 2 DIABETES MELLITUS WITH FOOT ULCER, WITH LONG-TERM CURRENT USE OF INSULIN: ICD-10-CM

## 2021-11-10 DIAGNOSIS — Z12.5 SCREENING FOR PROSTATE CANCER: ICD-10-CM

## 2021-11-10 DIAGNOSIS — E11.52 TYPE 2 DIABETES MELLITUS WITH DIABETIC PERIPHERAL ANGIOPATHY AND GANGRENE, WITHOUT LONG-TERM CURRENT USE OF INSULIN: ICD-10-CM

## 2021-11-10 DIAGNOSIS — E78.5 HYPERLIPIDEMIA ASSOCIATED WITH TYPE 2 DIABETES MELLITUS: ICD-10-CM

## 2021-11-10 DIAGNOSIS — I70.263 ATHEROSCLEROSIS OF NATIVE ARTERY OF BOTH LOWER EXTREMITIES WITH GANGRENE: Primary | ICD-10-CM

## 2021-11-10 DIAGNOSIS — E11.69 HYPERLIPIDEMIA ASSOCIATED WITH TYPE 2 DIABETES MELLITUS: ICD-10-CM

## 2021-11-10 DIAGNOSIS — E66.09 CLASS 1 OBESITY DUE TO EXCESS CALORIES WITH SERIOUS COMORBIDITY AND BODY MASS INDEX (BMI) OF 33.0 TO 33.9 IN ADULT: ICD-10-CM

## 2021-11-10 DIAGNOSIS — L97.509 TYPE 2 DIABETES MELLITUS WITH FOOT ULCER, WITH LONG-TERM CURRENT USE OF INSULIN: ICD-10-CM

## 2021-11-10 DIAGNOSIS — I15.2 HYPERTENSION ASSOCIATED WITH TYPE 2 DIABETES MELLITUS: ICD-10-CM

## 2021-11-10 DIAGNOSIS — Z79.4 TYPE 2 DIABETES MELLITUS WITH FOOT ULCER, WITH LONG-TERM CURRENT USE OF INSULIN: ICD-10-CM

## 2021-11-10 DIAGNOSIS — E11.59 HYPERTENSION ASSOCIATED WITH TYPE 2 DIABETES MELLITUS: ICD-10-CM

## 2021-11-10 PROCEDURE — 3051F HG A1C>EQUAL 7.0%<8.0%: CPT | Mod: CPTII,S$GLB,, | Performed by: FAMILY MEDICINE

## 2021-11-10 PROCEDURE — 1111F DSCHRG MED/CURRENT MED MERGE: CPT | Mod: CPTII,S$GLB,, | Performed by: FAMILY MEDICINE

## 2021-11-10 PROCEDURE — 3061F NEG MICROALBUMINURIA REV: CPT | Mod: CPTII,S$GLB,, | Performed by: FAMILY MEDICINE

## 2021-11-10 PROCEDURE — 3074F SYST BP LT 130 MM HG: CPT | Mod: CPTII,S$GLB,, | Performed by: FAMILY MEDICINE

## 2021-11-10 PROCEDURE — 3051F PR MOST RECENT HEMOGLOBIN A1C LEVEL 7.0 - < 8.0%: ICD-10-PCS | Mod: CPTII,S$GLB,, | Performed by: FAMILY MEDICINE

## 2021-11-10 PROCEDURE — 4010F ACE/ARB THERAPY RXD/TAKEN: CPT | Mod: CPTII,S$GLB,, | Performed by: FAMILY MEDICINE

## 2021-11-10 PROCEDURE — 4010F PR ACE/ARB THEARPY RXD/TAKEN: ICD-10-PCS | Mod: CPTII,S$GLB,, | Performed by: FAMILY MEDICINE

## 2021-11-10 PROCEDURE — 99215 PR OFFICE/OUTPT VISIT, EST, LEVL V, 40-54 MIN: ICD-10-PCS | Mod: S$GLB,,, | Performed by: FAMILY MEDICINE

## 2021-11-10 PROCEDURE — 99215 OFFICE O/P EST HI 40 MIN: CPT | Mod: S$GLB,,, | Performed by: FAMILY MEDICINE

## 2021-11-10 PROCEDURE — 1159F PR MEDICATION LIST DOCUMENTED IN MEDICAL RECORD: ICD-10-PCS | Mod: CPTII,S$GLB,, | Performed by: FAMILY MEDICINE

## 2021-11-10 PROCEDURE — 1111F PR DISCHARGE MEDS RECONCILED W/ CURRENT OUTPATIENT MED LIST: ICD-10-PCS | Mod: CPTII,S$GLB,, | Performed by: FAMILY MEDICINE

## 2021-11-10 PROCEDURE — 1160F RVW MEDS BY RX/DR IN RCRD: CPT | Mod: CPTII,S$GLB,, | Performed by: FAMILY MEDICINE

## 2021-11-10 PROCEDURE — 3072F PR LOW RISK FOR RETINOPATHY: ICD-10-PCS | Mod: CPTII,S$GLB,, | Performed by: FAMILY MEDICINE

## 2021-11-10 PROCEDURE — 3066F PR DOCUMENTATION OF TREATMENT FOR NEPHROPATHY: ICD-10-PCS | Mod: CPTII,S$GLB,, | Performed by: FAMILY MEDICINE

## 2021-11-10 PROCEDURE — 1159F MED LIST DOCD IN RCRD: CPT | Mod: CPTII,S$GLB,, | Performed by: FAMILY MEDICINE

## 2021-11-10 PROCEDURE — 3079F DIAST BP 80-89 MM HG: CPT | Mod: CPTII,S$GLB,, | Performed by: FAMILY MEDICINE

## 2021-11-10 PROCEDURE — 3079F PR MOST RECENT DIASTOLIC BLOOD PRESSURE 80-89 MM HG: ICD-10-PCS | Mod: CPTII,S$GLB,, | Performed by: FAMILY MEDICINE

## 2021-11-10 PROCEDURE — 1160F PR REVIEW ALL MEDS BY PRESCRIBER/CLIN PHARMACIST DOCUMENTED: ICD-10-PCS | Mod: CPTII,S$GLB,, | Performed by: FAMILY MEDICINE

## 2021-11-10 PROCEDURE — 3061F PR NEG MICROALBUMINURIA RESULT DOCUMENTED/REVIEW: ICD-10-PCS | Mod: CPTII,S$GLB,, | Performed by: FAMILY MEDICINE

## 2021-11-10 PROCEDURE — 3066F NEPHROPATHY DOC TX: CPT | Mod: CPTII,S$GLB,, | Performed by: FAMILY MEDICINE

## 2021-11-10 PROCEDURE — 3074F PR MOST RECENT SYSTOLIC BLOOD PRESSURE < 130 MM HG: ICD-10-PCS | Mod: CPTII,S$GLB,, | Performed by: FAMILY MEDICINE

## 2021-11-10 PROCEDURE — 99999 PR PBB SHADOW E&M-EST. PATIENT-LVL V: ICD-10-PCS | Mod: PBBFAC,,, | Performed by: FAMILY MEDICINE

## 2021-11-10 PROCEDURE — 3008F PR BODY MASS INDEX (BMI) DOCUMENTED: ICD-10-PCS | Mod: CPTII,S$GLB,, | Performed by: FAMILY MEDICINE

## 2021-11-10 PROCEDURE — 3072F LOW RISK FOR RETINOPATHY: CPT | Mod: CPTII,S$GLB,, | Performed by: FAMILY MEDICINE

## 2021-11-10 PROCEDURE — 99999 PR PBB SHADOW E&M-EST. PATIENT-LVL V: CPT | Mod: PBBFAC,,, | Performed by: FAMILY MEDICINE

## 2021-11-10 PROCEDURE — 3008F BODY MASS INDEX DOCD: CPT | Mod: CPTII,S$GLB,, | Performed by: FAMILY MEDICINE

## 2021-11-10 RX ORDER — DULAGLUTIDE 0.75 MG/.5ML
0.75 INJECTION, SOLUTION SUBCUTANEOUS
Qty: 4 PEN | Refills: 2 | Status: SHIPPED | OUTPATIENT
Start: 2021-11-10 | End: 2021-12-02 | Stop reason: SDUPTHER

## 2021-11-10 RX ORDER — ROSUVASTATIN CALCIUM 10 MG/1
10 TABLET, COATED ORAL NIGHTLY
Qty: 90 TABLET | Refills: 3 | Status: SHIPPED | OUTPATIENT
Start: 2021-11-10 | End: 2021-12-06 | Stop reason: SDUPTHER

## 2021-11-10 RX ORDER — LOSARTAN POTASSIUM AND HYDROCHLOROTHIAZIDE 25; 100 MG/1; MG/1
1 TABLET ORAL DAILY
Qty: 90 TABLET | Refills: 3 | Status: SHIPPED | OUTPATIENT
Start: 2021-11-10 | End: 2021-12-06 | Stop reason: SDUPTHER

## 2021-11-11 ENCOUNTER — PATIENT MESSAGE (OUTPATIENT)
Dept: PODIATRY | Facility: CLINIC | Age: 66
End: 2021-11-11
Payer: COMMERCIAL

## 2021-11-18 ENCOUNTER — OFFICE VISIT (OUTPATIENT)
Dept: PODIATRY | Facility: CLINIC | Age: 66
End: 2021-11-18
Payer: COMMERCIAL

## 2021-11-18 VITALS
HEIGHT: 73 IN | SYSTOLIC BLOOD PRESSURE: 129 MMHG | OXYGEN SATURATION: 98 % | DIASTOLIC BLOOD PRESSURE: 69 MMHG | BODY MASS INDEX: 32.6 KG/M2 | WEIGHT: 246 LBS

## 2021-11-18 DIAGNOSIS — E11.52 TYPE 2 DIABETES MELLITUS WITH DIABETIC PERIPHERAL ANGIOPATHY AND GANGRENE, WITHOUT LONG-TERM CURRENT USE OF INSULIN: ICD-10-CM

## 2021-11-18 DIAGNOSIS — L97.509 TYPE 2 DIABETES MELLITUS WITH FOOT ULCER, WITH LONG-TERM CURRENT USE OF INSULIN: Primary | ICD-10-CM

## 2021-11-18 DIAGNOSIS — R23.4 ESCHAR OF TOE: ICD-10-CM

## 2021-11-18 DIAGNOSIS — Z79.4 TYPE 2 DIABETES MELLITUS WITH FOOT ULCER, WITH LONG-TERM CURRENT USE OF INSULIN: Primary | ICD-10-CM

## 2021-11-18 DIAGNOSIS — E11.621 TYPE 2 DIABETES MELLITUS WITH FOOT ULCER, WITH LONG-TERM CURRENT USE OF INSULIN: Primary | ICD-10-CM

## 2021-11-18 PROCEDURE — 3078F DIAST BP <80 MM HG: CPT | Mod: CPTII,S$GLB,, | Performed by: PODIATRIST

## 2021-11-18 PROCEDURE — 3074F SYST BP LT 130 MM HG: CPT | Mod: CPTII,S$GLB,, | Performed by: PODIATRIST

## 2021-11-18 PROCEDURE — 3008F PR BODY MASS INDEX (BMI) DOCUMENTED: ICD-10-PCS | Mod: CPTII,S$GLB,, | Performed by: PODIATRIST

## 2021-11-18 PROCEDURE — 1111F PR DISCHARGE MEDS RECONCILED W/ CURRENT OUTPATIENT MED LIST: ICD-10-PCS | Mod: CPTII,S$GLB,, | Performed by: PODIATRIST

## 2021-11-18 PROCEDURE — 3061F NEG MICROALBUMINURIA REV: CPT | Mod: CPTII,S$GLB,, | Performed by: PODIATRIST

## 2021-11-18 PROCEDURE — 1159F MED LIST DOCD IN RCRD: CPT | Mod: CPTII,S$GLB,, | Performed by: PODIATRIST

## 2021-11-18 PROCEDURE — 3051F PR MOST RECENT HEMOGLOBIN A1C LEVEL 7.0 - < 8.0%: ICD-10-PCS | Mod: CPTII,S$GLB,, | Performed by: PODIATRIST

## 2021-11-18 PROCEDURE — 3072F LOW RISK FOR RETINOPATHY: CPT | Mod: CPTII,S$GLB,, | Performed by: PODIATRIST

## 2021-11-18 PROCEDURE — 99214 OFFICE O/P EST MOD 30 MIN: CPT | Mod: 25,S$GLB,, | Performed by: PODIATRIST

## 2021-11-18 PROCEDURE — 1159F PR MEDICATION LIST DOCUMENTED IN MEDICAL RECORD: ICD-10-PCS | Mod: CPTII,S$GLB,, | Performed by: PODIATRIST

## 2021-11-18 PROCEDURE — 3051F HG A1C>EQUAL 7.0%<8.0%: CPT | Mod: CPTII,S$GLB,, | Performed by: PODIATRIST

## 2021-11-18 PROCEDURE — 3066F PR DOCUMENTATION OF TREATMENT FOR NEPHROPATHY: ICD-10-PCS | Mod: CPTII,S$GLB,, | Performed by: PODIATRIST

## 2021-11-18 PROCEDURE — 3078F PR MOST RECENT DIASTOLIC BLOOD PRESSURE < 80 MM HG: ICD-10-PCS | Mod: CPTII,S$GLB,, | Performed by: PODIATRIST

## 2021-11-18 PROCEDURE — 99999 PR PBB SHADOW E&M-EST. PATIENT-LVL III: ICD-10-PCS | Mod: PBBFAC,,, | Performed by: PODIATRIST

## 2021-11-18 PROCEDURE — 99214 PR OFFICE/OUTPT VISIT, EST, LEVL IV, 30-39 MIN: ICD-10-PCS | Mod: 25,S$GLB,, | Performed by: PODIATRIST

## 2021-11-18 PROCEDURE — 1160F PR REVIEW ALL MEDS BY PRESCRIBER/CLIN PHARMACIST DOCUMENTED: ICD-10-PCS | Mod: CPTII,S$GLB,, | Performed by: PODIATRIST

## 2021-11-18 PROCEDURE — 3074F PR MOST RECENT SYSTOLIC BLOOD PRESSURE < 130 MM HG: ICD-10-PCS | Mod: CPTII,S$GLB,, | Performed by: PODIATRIST

## 2021-11-18 PROCEDURE — 4010F ACE/ARB THERAPY RXD/TAKEN: CPT | Mod: CPTII,S$GLB,, | Performed by: PODIATRIST

## 2021-11-18 PROCEDURE — 4010F PR ACE/ARB THEARPY RXD/TAKEN: ICD-10-PCS | Mod: CPTII,S$GLB,, | Performed by: PODIATRIST

## 2021-11-18 PROCEDURE — 99999 PR PBB SHADOW E&M-EST. PATIENT-LVL III: CPT | Mod: PBBFAC,,, | Performed by: PODIATRIST

## 2021-11-18 PROCEDURE — 3066F NEPHROPATHY DOC TX: CPT | Mod: CPTII,S$GLB,, | Performed by: PODIATRIST

## 2021-11-18 PROCEDURE — 3072F PR LOW RISK FOR RETINOPATHY: ICD-10-PCS | Mod: CPTII,S$GLB,, | Performed by: PODIATRIST

## 2021-11-18 PROCEDURE — 3008F BODY MASS INDEX DOCD: CPT | Mod: CPTII,S$GLB,, | Performed by: PODIATRIST

## 2021-11-18 PROCEDURE — 11042 DBRDMT SUBQ TIS 1ST 20SQCM/<: CPT | Mod: S$GLB,,, | Performed by: PODIATRIST

## 2021-11-18 PROCEDURE — 11042 WOUND DEBRIDEMENT: ICD-10-PCS | Mod: S$GLB,,, | Performed by: PODIATRIST

## 2021-11-18 PROCEDURE — 1111F DSCHRG MED/CURRENT MED MERGE: CPT | Mod: CPTII,S$GLB,, | Performed by: PODIATRIST

## 2021-11-18 PROCEDURE — 1160F RVW MEDS BY RX/DR IN RCRD: CPT | Mod: CPTII,S$GLB,, | Performed by: PODIATRIST

## 2021-11-18 PROCEDURE — 3061F PR NEG MICROALBUMINURIA RESULT DOCUMENTED/REVIEW: ICD-10-PCS | Mod: CPTII,S$GLB,, | Performed by: PODIATRIST

## 2021-12-02 ENCOUNTER — PATIENT MESSAGE (OUTPATIENT)
Dept: FAMILY MEDICINE | Facility: CLINIC | Age: 66
End: 2021-12-02
Payer: COMMERCIAL

## 2021-12-02 ENCOUNTER — TELEPHONE (OUTPATIENT)
Dept: FAMILY MEDICINE | Facility: CLINIC | Age: 66
End: 2021-12-02
Payer: COMMERCIAL

## 2021-12-02 DIAGNOSIS — Z79.4 TYPE 2 DIABETES MELLITUS WITH HYPERGLYCEMIA, WITH LONG-TERM CURRENT USE OF INSULIN: ICD-10-CM

## 2021-12-02 DIAGNOSIS — E11.69 HYPERLIPIDEMIA ASSOCIATED WITH TYPE 2 DIABETES MELLITUS: ICD-10-CM

## 2021-12-02 DIAGNOSIS — E11.52 TYPE 2 DIABETES MELLITUS WITH DIABETIC PERIPHERAL ANGIOPATHY AND GANGRENE, WITHOUT LONG-TERM CURRENT USE OF INSULIN: ICD-10-CM

## 2021-12-02 DIAGNOSIS — I10 ESSENTIAL HYPERTENSION: ICD-10-CM

## 2021-12-02 DIAGNOSIS — R79.89 LOW TESTOSTERONE: ICD-10-CM

## 2021-12-02 DIAGNOSIS — E78.5 HYPERLIPIDEMIA ASSOCIATED WITH TYPE 2 DIABETES MELLITUS: ICD-10-CM

## 2021-12-02 DIAGNOSIS — I15.2 HYPERTENSION ASSOCIATED WITH TYPE 2 DIABETES MELLITUS: ICD-10-CM

## 2021-12-02 DIAGNOSIS — E11.59 HYPERTENSION ASSOCIATED WITH TYPE 2 DIABETES MELLITUS: ICD-10-CM

## 2021-12-02 DIAGNOSIS — E11.65 TYPE 2 DIABETES MELLITUS WITH HYPERGLYCEMIA, WITH LONG-TERM CURRENT USE OF INSULIN: ICD-10-CM

## 2021-12-02 RX ORDER — DULAGLUTIDE 0.75 MG/.5ML
0.75 INJECTION, SOLUTION SUBCUTANEOUS
Qty: 4 PEN | Refills: 2 | Status: SHIPPED | OUTPATIENT
Start: 2021-12-02 | End: 2021-12-23

## 2021-12-06 ENCOUNTER — TELEPHONE (OUTPATIENT)
Dept: FAMILY MEDICINE | Facility: CLINIC | Age: 66
End: 2021-12-06
Payer: COMMERCIAL

## 2021-12-07 ENCOUNTER — PATIENT MESSAGE (OUTPATIENT)
Dept: FAMILY MEDICINE | Facility: CLINIC | Age: 66
End: 2021-12-07
Payer: COMMERCIAL

## 2021-12-08 ENCOUNTER — PATIENT OUTREACH (OUTPATIENT)
Dept: ADMINISTRATIVE | Facility: OTHER | Age: 66
End: 2021-12-08
Payer: COMMERCIAL

## 2021-12-08 RX ORDER — FLASH GLUCOSE SENSOR
2 KIT MISCELLANEOUS
Qty: 2 KIT | Refills: 11 | Status: SHIPPED | OUTPATIENT
Start: 2021-12-08 | End: 2022-01-18 | Stop reason: SDUPTHER

## 2021-12-08 RX ORDER — INSULIN GLARGINE 100 [IU]/ML
25 INJECTION, SOLUTION SUBCUTANEOUS DAILY
Qty: 15 ML | Refills: 6 | Status: SHIPPED | OUTPATIENT
Start: 2021-12-08 | End: 2022-06-14 | Stop reason: SDUPTHER

## 2021-12-08 RX ORDER — LOSARTAN POTASSIUM AND HYDROCHLOROTHIAZIDE 25; 100 MG/1; MG/1
1 TABLET ORAL DAILY
Qty: 90 TABLET | Refills: 3 | Status: SHIPPED | OUTPATIENT
Start: 2021-12-08 | End: 2022-09-07 | Stop reason: SDUPTHER

## 2021-12-08 RX ORDER — PEN NEEDLE, DIABETIC 30 GX3/16"
NEEDLE, DISPOSABLE MISCELLANEOUS
Qty: 100 EACH | Refills: 5 | Status: SHIPPED | OUTPATIENT
Start: 2021-12-08

## 2021-12-08 RX ORDER — ROSUVASTATIN CALCIUM 10 MG/1
10 TABLET, COATED ORAL NIGHTLY
Qty: 90 TABLET | Refills: 3 | Status: SHIPPED | OUTPATIENT
Start: 2021-12-08 | End: 2022-02-10

## 2021-12-08 RX ORDER — AMLODIPINE BESYLATE 10 MG/1
10 TABLET ORAL DAILY
Qty: 90 TABLET | Refills: 0 | Status: SHIPPED | OUTPATIENT
Start: 2021-12-08 | End: 2022-04-08 | Stop reason: SDUPTHER

## 2021-12-08 RX ORDER — PIOGLITAZONEHYDROCHLORIDE 15 MG/1
15 TABLET ORAL DAILY
Qty: 90 TABLET | Refills: 3 | Status: SHIPPED | OUTPATIENT
Start: 2021-12-08 | End: 2021-12-23

## 2021-12-08 RX ORDER — FLASH GLUCOSE SCANNING READER
1 EACH MISCELLANEOUS DAILY
Qty: 1 EACH | Refills: 0 | Status: SHIPPED | OUTPATIENT
Start: 2021-12-08 | End: 2022-01-18 | Stop reason: SDUPTHER

## 2021-12-09 ENCOUNTER — OFFICE VISIT (OUTPATIENT)
Dept: PODIATRY | Facility: CLINIC | Age: 66
End: 2021-12-09
Payer: COMMERCIAL

## 2021-12-09 VITALS
BODY MASS INDEX: 32.6 KG/M2 | WEIGHT: 246 LBS | HEART RATE: 85 BPM | HEIGHT: 73 IN | DIASTOLIC BLOOD PRESSURE: 80 MMHG | SYSTOLIC BLOOD PRESSURE: 140 MMHG

## 2021-12-09 DIAGNOSIS — L97.509 TYPE 2 DIABETES MELLITUS WITH FOOT ULCER, WITH LONG-TERM CURRENT USE OF INSULIN: Primary | ICD-10-CM

## 2021-12-09 DIAGNOSIS — Z79.4 TYPE 2 DIABETES MELLITUS WITH FOOT ULCER, WITH LONG-TERM CURRENT USE OF INSULIN: Primary | ICD-10-CM

## 2021-12-09 DIAGNOSIS — I73.9 PVD (PERIPHERAL VASCULAR DISEASE): ICD-10-CM

## 2021-12-09 DIAGNOSIS — B35.1 TINEA UNGUIUM: ICD-10-CM

## 2021-12-09 DIAGNOSIS — R26.2 DIFFICULTY IN WALKING, NOT ELSEWHERE CLASSIFIED: ICD-10-CM

## 2021-12-09 DIAGNOSIS — E11.51 TYPE II DIABETES MELLITUS WITH PERIPHERAL CIRCULATORY DISORDER: ICD-10-CM

## 2021-12-09 DIAGNOSIS — E11.621 TYPE 2 DIABETES MELLITUS WITH FOOT ULCER, WITH LONG-TERM CURRENT USE OF INSULIN: Primary | ICD-10-CM

## 2021-12-09 PROCEDURE — 3066F NEPHROPATHY DOC TX: CPT | Mod: CPTII,S$GLB,, | Performed by: PODIATRIST

## 2021-12-09 PROCEDURE — 3072F PR LOW RISK FOR RETINOPATHY: ICD-10-PCS | Mod: CPTII,S$GLB,, | Performed by: PODIATRIST

## 2021-12-09 PROCEDURE — 3072F LOW RISK FOR RETINOPATHY: CPT | Mod: CPTII,S$GLB,, | Performed by: PODIATRIST

## 2021-12-09 PROCEDURE — 11721 DEBRIDE NAIL 6 OR MORE: CPT | Mod: S$GLB,,, | Performed by: PODIATRIST

## 2021-12-09 PROCEDURE — 99213 OFFICE O/P EST LOW 20 MIN: CPT | Mod: 25,S$GLB,, | Performed by: PODIATRIST

## 2021-12-09 PROCEDURE — 4010F PR ACE/ARB THEARPY RXD/TAKEN: ICD-10-PCS | Mod: CPTII,S$GLB,, | Performed by: PODIATRIST

## 2021-12-09 PROCEDURE — 99213 PR OFFICE/OUTPT VISIT, EST, LEVL III, 20-29 MIN: ICD-10-PCS | Mod: 25,S$GLB,, | Performed by: PODIATRIST

## 2021-12-09 PROCEDURE — 99999 PR PBB SHADOW E&M-EST. PATIENT-LVL III: ICD-10-PCS | Mod: PBBFAC,,, | Performed by: PODIATRIST

## 2021-12-09 PROCEDURE — 4010F ACE/ARB THERAPY RXD/TAKEN: CPT | Mod: CPTII,S$GLB,, | Performed by: PODIATRIST

## 2021-12-09 PROCEDURE — 3061F NEG MICROALBUMINURIA REV: CPT | Mod: CPTII,S$GLB,, | Performed by: PODIATRIST

## 2021-12-09 PROCEDURE — 99999 PR PBB SHADOW E&M-EST. PATIENT-LVL III: CPT | Mod: PBBFAC,,, | Performed by: PODIATRIST

## 2021-12-09 PROCEDURE — 3066F PR DOCUMENTATION OF TREATMENT FOR NEPHROPATHY: ICD-10-PCS | Mod: CPTII,S$GLB,, | Performed by: PODIATRIST

## 2021-12-09 PROCEDURE — 3061F PR NEG MICROALBUMINURIA RESULT DOCUMENTED/REVIEW: ICD-10-PCS | Mod: CPTII,S$GLB,, | Performed by: PODIATRIST

## 2021-12-09 PROCEDURE — 11721 PR DEBRIDEMENT OF NAILS, 6 OR MORE: ICD-10-PCS | Mod: S$GLB,,, | Performed by: PODIATRIST

## 2021-12-15 ENCOUNTER — PATIENT MESSAGE (OUTPATIENT)
Dept: FAMILY MEDICINE | Facility: CLINIC | Age: 66
End: 2021-12-15
Payer: COMMERCIAL

## 2021-12-16 ENCOUNTER — TELEPHONE (OUTPATIENT)
Dept: FAMILY MEDICINE | Facility: CLINIC | Age: 66
End: 2021-12-16
Payer: COMMERCIAL

## 2021-12-22 RX ORDER — DULAGLUTIDE 3 MG/.5ML
3 INJECTION, SOLUTION SUBCUTANEOUS
Qty: 12 PEN | Refills: 3 | Status: CANCELLED | OUTPATIENT
Start: 2021-12-22 | End: 2022-12-22

## 2021-12-23 ENCOUNTER — OFFICE VISIT (OUTPATIENT)
Dept: FAMILY MEDICINE | Facility: CLINIC | Age: 66
End: 2021-12-23
Payer: COMMERCIAL

## 2021-12-23 VITALS
HEIGHT: 73 IN | DIASTOLIC BLOOD PRESSURE: 80 MMHG | HEART RATE: 87 BPM | BODY MASS INDEX: 32.46 KG/M2 | SYSTOLIC BLOOD PRESSURE: 133 MMHG

## 2021-12-23 DIAGNOSIS — I15.2 HYPERTENSION ASSOCIATED WITH TYPE 2 DIABETES MELLITUS: ICD-10-CM

## 2021-12-23 DIAGNOSIS — E11.52 TYPE 2 DIABETES MELLITUS WITH DIABETIC PERIPHERAL ANGIOPATHY AND GANGRENE, WITHOUT LONG-TERM CURRENT USE OF INSULIN: ICD-10-CM

## 2021-12-23 DIAGNOSIS — I70.263 ATHEROSCLEROSIS OF NATIVE ARTERY OF BOTH LOWER EXTREMITIES WITH GANGRENE: ICD-10-CM

## 2021-12-23 DIAGNOSIS — E11.59 HYPERTENSION ASSOCIATED WITH TYPE 2 DIABETES MELLITUS: ICD-10-CM

## 2021-12-23 DIAGNOSIS — E11.628 DIABETIC FOOT INFECTION: ICD-10-CM

## 2021-12-23 DIAGNOSIS — E11.621 TYPE 2 DIABETES MELLITUS WITH FOOT ULCER, WITH LONG-TERM CURRENT USE OF INSULIN: Primary | ICD-10-CM

## 2021-12-23 DIAGNOSIS — Z79.4 TYPE 2 DIABETES MELLITUS WITH FOOT ULCER, WITH LONG-TERM CURRENT USE OF INSULIN: Primary | ICD-10-CM

## 2021-12-23 DIAGNOSIS — L97.509 TYPE 2 DIABETES MELLITUS WITH FOOT ULCER, WITH LONG-TERM CURRENT USE OF INSULIN: Primary | ICD-10-CM

## 2021-12-23 DIAGNOSIS — L08.9 DIABETIC FOOT INFECTION: ICD-10-CM

## 2021-12-23 PROBLEM — K40.90 NON-RECURRENT UNILATERAL INGUINAL HERNIA WITHOUT OBSTRUCTION OR GANGRENE: Status: RESOLVED | Noted: 2021-09-30 | Resolved: 2021-12-23

## 2021-12-23 PROBLEM — R19.09: Status: RESOLVED | Noted: 2021-09-30 | Resolved: 2021-12-23

## 2021-12-23 PROCEDURE — 1159F PR MEDICATION LIST DOCUMENTED IN MEDICAL RECORD: ICD-10-PCS | Mod: CPTII,95,, | Performed by: FAMILY MEDICINE

## 2021-12-23 PROCEDURE — 1160F RVW MEDS BY RX/DR IN RCRD: CPT | Mod: CPTII,95,, | Performed by: FAMILY MEDICINE

## 2021-12-23 PROCEDURE — 3061F NEG MICROALBUMINURIA REV: CPT | Mod: CPTII,95,, | Performed by: FAMILY MEDICINE

## 2021-12-23 PROCEDURE — 3072F PR LOW RISK FOR RETINOPATHY: ICD-10-PCS | Mod: CPTII,95,, | Performed by: FAMILY MEDICINE

## 2021-12-23 PROCEDURE — 3066F NEPHROPATHY DOC TX: CPT | Mod: CPTII,95,, | Performed by: FAMILY MEDICINE

## 2021-12-23 PROCEDURE — 3008F PR BODY MASS INDEX (BMI) DOCUMENTED: ICD-10-PCS | Mod: CPTII,95,, | Performed by: FAMILY MEDICINE

## 2021-12-23 PROCEDURE — 3072F LOW RISK FOR RETINOPATHY: CPT | Mod: CPTII,95,, | Performed by: FAMILY MEDICINE

## 2021-12-23 PROCEDURE — 3079F DIAST BP 80-89 MM HG: CPT | Mod: CPTII,95,, | Performed by: FAMILY MEDICINE

## 2021-12-23 PROCEDURE — 3079F PR MOST RECENT DIASTOLIC BLOOD PRESSURE 80-89 MM HG: ICD-10-PCS | Mod: CPTII,95,, | Performed by: FAMILY MEDICINE

## 2021-12-23 PROCEDURE — 3066F PR DOCUMENTATION OF TREATMENT FOR NEPHROPATHY: ICD-10-PCS | Mod: CPTII,95,, | Performed by: FAMILY MEDICINE

## 2021-12-23 PROCEDURE — 3075F PR MOST RECENT SYSTOLIC BLOOD PRESS GE 130-139MM HG: ICD-10-PCS | Mod: CPTII,95,, | Performed by: FAMILY MEDICINE

## 2021-12-23 PROCEDURE — 4010F PR ACE/ARB THEARPY RXD/TAKEN: ICD-10-PCS | Mod: CPTII,95,, | Performed by: FAMILY MEDICINE

## 2021-12-23 PROCEDURE — 3075F SYST BP GE 130 - 139MM HG: CPT | Mod: CPTII,95,, | Performed by: FAMILY MEDICINE

## 2021-12-23 PROCEDURE — 3008F BODY MASS INDEX DOCD: CPT | Mod: CPTII,95,, | Performed by: FAMILY MEDICINE

## 2021-12-23 PROCEDURE — 4010F ACE/ARB THERAPY RXD/TAKEN: CPT | Mod: CPTII,95,, | Performed by: FAMILY MEDICINE

## 2021-12-23 PROCEDURE — 99214 PR OFFICE/OUTPT VISIT, EST, LEVL IV, 30-39 MIN: ICD-10-PCS | Mod: 95,,, | Performed by: FAMILY MEDICINE

## 2021-12-23 PROCEDURE — 3051F HG A1C>EQUAL 7.0%<8.0%: CPT | Mod: CPTII,95,, | Performed by: FAMILY MEDICINE

## 2021-12-23 PROCEDURE — 99214 OFFICE O/P EST MOD 30 MIN: CPT | Mod: 95,,, | Performed by: FAMILY MEDICINE

## 2021-12-23 PROCEDURE — 3061F PR NEG MICROALBUMINURIA RESULT DOCUMENTED/REVIEW: ICD-10-PCS | Mod: CPTII,95,, | Performed by: FAMILY MEDICINE

## 2021-12-23 PROCEDURE — 1160F PR REVIEW ALL MEDS BY PRESCRIBER/CLIN PHARMACIST DOCUMENTED: ICD-10-PCS | Mod: CPTII,95,, | Performed by: FAMILY MEDICINE

## 2021-12-23 PROCEDURE — 3051F PR MOST RECENT HEMOGLOBIN A1C LEVEL 7.0 - < 8.0%: ICD-10-PCS | Mod: CPTII,95,, | Performed by: FAMILY MEDICINE

## 2021-12-23 PROCEDURE — 1159F MED LIST DOCD IN RCRD: CPT | Mod: CPTII,95,, | Performed by: FAMILY MEDICINE

## 2021-12-23 RX ORDER — DULAGLUTIDE 1.5 MG/.5ML
1.5 INJECTION, SOLUTION SUBCUTANEOUS
Qty: 12 PEN | Refills: 0 | Status: SHIPPED | OUTPATIENT
Start: 2021-12-23 | End: 2022-03-23

## 2021-12-23 RX ORDER — PIOGLITAZONEHYDROCHLORIDE 30 MG/1
30 TABLET ORAL DAILY
Qty: 90 TABLET | Refills: 1 | Status: SHIPPED | OUTPATIENT
Start: 2021-12-23 | End: 2022-09-07 | Stop reason: SDUPTHER

## 2022-01-05 ENCOUNTER — TELEPHONE (OUTPATIENT)
Dept: PODIATRY | Facility: CLINIC | Age: 67
End: 2022-01-05
Payer: COMMERCIAL

## 2022-01-05 ENCOUNTER — OFFICE VISIT (OUTPATIENT)
Dept: PODIATRY | Facility: CLINIC | Age: 67
End: 2022-01-05
Payer: COMMERCIAL

## 2022-01-05 DIAGNOSIS — Z53.21 PATIENT LEFT WITHOUT BEING SEEN: Primary | ICD-10-CM

## 2022-01-05 PROCEDURE — 99499 NO LOS: ICD-10-PCS | Mod: 95,,, | Performed by: PODIATRIST

## 2022-01-05 PROCEDURE — 99499 UNLISTED E&M SERVICE: CPT | Mod: 95,,, | Performed by: PODIATRIST

## 2022-01-05 NOTE — TELEPHONE ENCOUNTER
----- Message from Evert Davis sent at 1/5/2022  1:56 PM CST -----  Type:  Patient Returning Call    Who Called:Pt  Would the patient rather a call back or a response via [facility]? call  Best Call Back Number:[phone]  Additional Information:     Pt stated he had issues contacting to online visit today  Pt would like a call back today

## 2022-01-15 ENCOUNTER — PATIENT MESSAGE (OUTPATIENT)
Dept: FAMILY MEDICINE | Facility: CLINIC | Age: 67
End: 2022-01-15
Payer: COMMERCIAL

## 2022-01-15 DIAGNOSIS — Z79.4 TYPE 2 DIABETES MELLITUS WITH HYPERGLYCEMIA, WITH LONG-TERM CURRENT USE OF INSULIN: ICD-10-CM

## 2022-01-15 DIAGNOSIS — E11.65 TYPE 2 DIABETES MELLITUS WITH HYPERGLYCEMIA, WITH LONG-TERM CURRENT USE OF INSULIN: ICD-10-CM

## 2022-01-18 RX ORDER — FLASH GLUCOSE SENSOR
2 KIT MISCELLANEOUS
Qty: 2 KIT | Refills: 11 | Status: SHIPPED | OUTPATIENT
Start: 2022-01-18 | End: 2022-02-11 | Stop reason: SDUPTHER

## 2022-01-18 RX ORDER — FLASH GLUCOSE SCANNING READER
1 EACH MISCELLANEOUS DAILY
Qty: 1 EACH | Refills: 0 | Status: SHIPPED | OUTPATIENT
Start: 2022-01-18

## 2022-01-20 ENCOUNTER — PATIENT MESSAGE (OUTPATIENT)
Dept: FAMILY MEDICINE | Facility: CLINIC | Age: 67
End: 2022-01-20
Payer: COMMERCIAL

## 2022-01-21 ENCOUNTER — TELEPHONE (OUTPATIENT)
Dept: PHARMACY | Facility: CLINIC | Age: 67
End: 2022-01-21
Payer: COMMERCIAL

## 2022-02-10 ENCOUNTER — PATIENT MESSAGE (OUTPATIENT)
Dept: FAMILY MEDICINE | Facility: CLINIC | Age: 67
End: 2022-02-10
Payer: COMMERCIAL

## 2022-02-10 DIAGNOSIS — E11.52 TYPE 2 DIABETES MELLITUS WITH DIABETIC PERIPHERAL ANGIOPATHY AND GANGRENE, WITHOUT LONG-TERM CURRENT USE OF INSULIN: Primary | ICD-10-CM

## 2022-02-10 DIAGNOSIS — E78.5 HYPERLIPIDEMIA ASSOCIATED WITH TYPE 2 DIABETES MELLITUS: ICD-10-CM

## 2022-02-10 DIAGNOSIS — E11.69 HYPERLIPIDEMIA ASSOCIATED WITH TYPE 2 DIABETES MELLITUS: ICD-10-CM

## 2022-02-10 RX ORDER — ROSUVASTATIN CALCIUM 20 MG/1
20 TABLET, COATED ORAL NIGHTLY
Qty: 90 TABLET | Refills: 3 | Status: SHIPPED | OUTPATIENT
Start: 2022-02-10 | End: 2022-09-09 | Stop reason: ALTCHOICE

## 2022-02-10 NOTE — TELEPHONE ENCOUNTER
Please call patient.  He misses visit today.  I sent him a summary of his lab results and recommendations on his MyChart.  I recommend rescheduled for 4 months with A1c 1 week prior.  Everything looks very good.  Did recommend increasing his cholesterol medicine because of his known history of calcification is arteries and I explained this and is MyChart.  Please have him check his [employer] messages    Orders Placed This Encounter    Hemoglobin A1C    rosuvastatin (CRESTOR) 20 MG tablet     Dr. Idalia Alexandra D.O.   Family Medicine

## 2022-02-10 NOTE — TELEPHONE ENCOUNTER
Spoke with pt, scheduled follow up and labs. Pt stated he seen wegovy on TV and asked if you think this is an option for him please advise.

## 2022-02-10 NOTE — TELEPHONE ENCOUNTER
Wegovy is an option but it is very expensive about $1400 a month  Dr. Idalia Alexandra D.O.   [facility]

## 2022-02-11 DIAGNOSIS — E11.65 TYPE 2 DIABETES MELLITUS WITH HYPERGLYCEMIA, WITH LONG-TERM CURRENT USE OF INSULIN: ICD-10-CM

## 2022-02-11 DIAGNOSIS — Z79.4 TYPE 2 DIABETES MELLITUS WITH HYPERGLYCEMIA, WITH LONG-TERM CURRENT USE OF INSULIN: ICD-10-CM

## 2022-02-11 NOTE — TELEPHONE ENCOUNTER
No new care gaps identified.  Powered by [employer]. Reference number: [id number].   2/11/2022 2:38:54 PM CST

## 2022-02-11 NOTE — TELEPHONE ENCOUNTER
----- Message from Joselin Saldana sent at 2/11/2022  9:06 AM CST -----  Contact: [phone]  Who Called: PT  Regarding: speak with nurse bout rx    Would the patient rather a call back or a response via [facility]? Call back  Best Call Back Number: [phone]   Additional Information:

## 2022-02-14 RX ORDER — FLASH GLUCOSE SENSOR
2 KIT MISCELLANEOUS
Qty: 2 KIT | Refills: 11 | Status: SHIPPED | OUTPATIENT
Start: 2022-02-14 | End: 2022-06-06 | Stop reason: SDUPTHER

## 2022-03-15 ENCOUNTER — TELEPHONE (OUTPATIENT)
Dept: FAMILY MEDICINE | Facility: CLINIC | Age: 67
End: 2022-03-15
Payer: COMMERCIAL

## 2022-03-15 NOTE — PROGRESS NOTES
VIRTUAL/TELEMEDICINE VISIT - converted to an in office visit since unable to perform ear exam via telemedicine   FAMILY MEDICINE   [facility]     The patient location is: [place]  The chief complaint leading to consultation is:  Dizziness, bloating  Visit type: Virtual visit with synchronous audio and video but converted to an office visit  Total time spent: 30 minute  Each patient to whom he or she provides medical services by telemedicine is:  (1) informed of the relationship between the physician and patient and the respective role of any other health care provider with respect to management of the patient; and (2) notified that he or she may decline to receive medical services by telemedicine and may withdraw from such care at any time.    Reason for visit:   Chief Complaint   Patient presents with    Dizziness       SUBJECTIVE: Sj Gonzalez is a 66 y.o. male  - obesity, hypertension, hyperlipidemia, type 2 diabetes, lumbar degenerative disc disease, and peripheral arterial disease presents for dizziness, ear pain and chronic abdominal pain     Cardiology: Dr. Jeovany Hart   Endocrinology Dr. Burgess  Podiatry Dr. Zepeda    1. Dizziness    He reports 2 days ago he started is increased pressure in his ears as well as decreased hearing.  Therefore he performed a Valsalva maneuver while holding his nose.  He reports he felt a sudden pop and then admitted dizziness.  Since then he reports dizziness.  He reports is worse when moving his head sitting upper any movement.  He denies any localized weakness, headache, nausea, vomiting, slurred speech or changes in vision.  He is not taking anything over-the-counter denies any new medications.    2. Abdominal discomfort    Reports that he has been suffering from this abdominal pain for the last 2 years.  Reports that it seems to be generalized and epigastric area but also overlying his prior hernia repair site.  He reports he had a inguinal  hernia repaired 9/2021.  He reports that he constantly feels bloated in that he is not completely evacuating his colon.  He did try to take milk of magnesia today and had 2 large bowel movements without relief of his symptoms.    He did see gastroenterology for this issue 08/24/2022 NP Magdaleno Rose.  (note and imaging reviewed).  At that time he complained of abdominal pain and diarrhea for about 3-4 weeks.  He noted at that time that his symptoms have started after resuming metformin.  He also noted bloating-distension.  He had had multiple episodes of small, loose watery stools.  He had also taken milk of magnesia at that time.  Gastroenterology ordered abdominal x-ray, abdominal ultrasound and colonoscopy.  Abdominal ultrasound was unremarkable except for enlarged fatty liver.  Abdominal x-ray 08/24/2020 showed no free intraperitoneal air.  Lung bases are clear.  There are no dilated loops of bowel.  He was able to complete his colonoscopy 09/25/2020 which showed multiple diverticuli in the entire colon.  Internal hemorrhoids.  Otherwise normal.  No polyps and no specimens collected.    He is no longer metformin and although diarrhea has improved he continues to have the abdominal pain..         Otalgia   There is pain in both ears. This is a new problem. The current episode started in the past 7 days. The problem occurs constantly. The problem has been unchanged. There has been no fever. The pain is at a severity of 6/10. The pain is moderate. Pertinent negatives include no abdominal pain, coughing, diarrhea, drainage, ear discharge, headaches, hearing loss, neck pain, rash, rhinorrhea, sore throat or vomiting. He has tried nothing for the symptoms. The treatment provided no relief. There is no history of a chronic ear infection, hearing loss or a tympanostomy tube.       Review of Systems   HENT: Positive for ear pain. Negative for ear discharge, hearing loss, rhinorrhea and sore throat.    Respiratory:  Negative for cough.    Gastrointestinal: Negative for abdominal pain, diarrhea and vomiting.   Musculoskeletal: Negative for neck pain.   Skin: Negative for rash.   Neurological: Negative for headaches.   All other systems reviewed and are negative.        HISTORY:   Past Medical History:   Diagnosis Date    Diabetes mellitus     Heart murmur     MVP    Hypertension     Non-recurrent unilateral inguinal hernia without obstruction or gangrene 9/30/2021    Urinary tract infection        Past Surgical History:   Procedure Laterality Date    AORTOGRAPHY WITH SERIALOGRAPHY N/A 11/4/2021    Procedure: AORTOGRAM, WITH SERIALOGRAPHY;  Surgeon: Jeovany Hart III, MD;  Location: [facility] CATH LAB;  Service: Cardiology;  Laterality: N/A;    ARTHROSCOPY OF KNEE Right     COLONOSCOPY N/A 9/25/2020    Procedure: COLONOSCOPY;  Surgeon: Vicky Hodges MD;  Location: [facility] ENDO;  Service: Endoscopy;  Laterality: N/A;    HERNIA REPAIR      ROBOT-ASSISTED LAPAROSCOPIC REPAIR OF INGUINAL HERNIA Right 9/30/2021    Procedure: ROBOTIC REPAIR, HERNIA, INGUINAL;  Surgeon: Carmelo Farooq MD;  Location: [facility] OR;  Service: General;  Laterality: Right;    SPINE SURGERY      Lumbar       Family History   Problem Relation Age of Onset    No Known Problems Mother     No Known Problems Father     Diabetes Sister     Diabetes Brother     No Known Problems Daughter     Cancer Neg Hx     Heart disease Neg Hx     Prostate cancer Neg Hx     Kidney disease Neg Hx        Social History     Tobacco Use    Smoking status: Never Smoker    Smokeless tobacco: Never Used   Substance Use Topics    Alcohol use: Yes     Alcohol/week: 0.0 standard drinks     Comment: daily    Drug use: No       Social History     Social History Narrative    He lives in [facility]. He lives with his wife and 2 children. His children are attending [employer]. He lived [place]. . Nonsmoker. Social drinker. He has a wholesale food  "business. His son attended [employer].        ALLERGIES:   Review of patient's allergies indicates:   Allergen Reactions    Metformin      GI intolerance    Lisinopril        MEDS:     Current Outpatient Medications:     amLODIPine (NORVASC) 10 MG tablet, Take 1 tablet (10 mg total) by mouth once daily., Disp: 90 tablet, Rfl: 0    aspirin 81 MG Chew, Take 1 tablet (81 mg total) by mouth once daily., Disp: 30 tablet, Rfl: 1    dulaglutide (TRULICITY) 1.5 mg/0.5 mL pen injector, Inject 1.5 mg into the skin every 7 days., Disp: 12 pen, Rfl: 0    flash glucose scanning reader (FREESTYLE MAKI 14 DAY READER) Misc, use once daily, Disp: 1 each, Rfl: 0    flash glucose sensor (FREESTYLE MAKI 14 DAY SENSOR) Kit, use and change every 14 days, Disp: 2 kit, Rfl: 11    insulin (BASAGLAR KWIKPEN U-100 INSULIN) glargine 100 units/mL (3mL) SubQ pen, Inject 25 Units into the skin once daily., Disp: 15 mL, Rfl: 6    losartan-hydrochlorothiazide 100-25 mg (HYZAAR) 100-25 mg per tablet, Take 1 tablet by mouth once daily., Disp: 90 tablet, Rfl: 3    pen needle, diabetic 32 gauge x 5/32" Ndle, Use nightly as directed with insulin pen., Disp: 100 each, Rfl: 5    pioglitazone (ACTOS) 30 MG tablet, Take 1 tablet (30 mg total) by mouth once daily., Disp: 90 tablet, Rfl: 1    blood sugar diagnostic Strp, Test blood sugar 3 (three) times daily before meals. (Patient not taking: Reported on 3/16/2022), Disp: 100 strip, Rfl: 11    meclizine (ANTIVERT) 25 mg tablet, Take 1 tablet (25 mg total) by mouth 3 (three) times daily as needed for Dizziness., Disp: 30 tablet, Rfl: 0    rosuvastatin (CRESTOR) 20 MG tablet, Take 1 tablet (20 mg total) by mouth every evening. (Patient not taking: Reported on 3/16/2022), Disp: 90 tablet, Rfl: 3    Vital signs:   Vitals:    03/16/22 0724   BP: 130/83   Pulse: 83     There is no height or weight on file to calculate BMI.    PHYSICAL EXAM:     Physical Exam  Constitutional:       General: He is not " in acute distress.  HENT:      Right Ear: Tympanic membrane and ear canal normal. No swelling or tenderness. There is impacted cerumen. Tympanic membrane is not perforated or erythematous.      Left Ear: Ear canal normal. No swelling or tenderness. A middle ear effusion (Clear) is present. Tympanic membrane is bulging. Tympanic membrane is not perforated, erythematous or retracted.      Ears:      Comments: Cerumen on the to left tympanic membrane but not impacted that was easily cleared with irrigation  Bilateral tympanic membranes evaluated after irrigation and curette.  Neck:      Vascular: No carotid bruit.   Cardiovascular:      Rate and Rhythm: Normal rate and regular rhythm.      Heart sounds: Normal heart sounds. No murmur heard.    No friction rub. No gallop.   Pulmonary:      Effort: Pulmonary effort is normal. No respiratory distress.      Breath sounds: Normal breath sounds. No wheezing, rhonchi or rales.   Abdominal:      General: Bowel sounds are normal. There is no distension.      Palpations: Abdomen is soft. There is no mass.      Tenderness: There is no abdominal tenderness. There is no guarding or rebound.   Musculoskeletal:      Cervical back: Neck supple.      Right lower leg: No edema.      Left lower leg: No edema.   Lymphadenopathy:      Cervical: No cervical adenopathy.   Skin:     General: Skin is warm.   Neurological:      General: No focal deficit present.      Mental Status: He is alert and oriented to person, place, and time.      Cranial Nerves: No cranial nerve deficit.      Sensory: No sensory deficit.      Motor: No weakness.   Psychiatric:         Speech: Speech normal.           PHQ4 = Score: 0    PERTINENT RESULTS:   No visits with results within 1 Week(s) from this visit.   Latest known visit with results is:   Lab Visit on 02/04/2022   Component Date Value Ref Range Status    Microalbumin, Urine 02/04/2022 14.0  ug/mL Final    Creatinine, Urine 02/04/2022 160.0  23.0 - 375.0  mg/dL Final    Microalb/Creat Ratio 02/04/2022 8.8  0.0 - 30.0 ug/mg Final     Lab Results   Component Value Date    HGBA1C 6.8 (H) 02/04/2022     BMP  Lab Results   Component Value Date     02/04/2022    K 3.9 02/04/2022    CL 98 02/04/2022    CO2 29 02/04/2022    BUN 14 02/04/2022    CREATININE 1.06 02/04/2022    CALCIUM 8.9 02/04/2022    ANIONGAP 13 02/04/2022    ESTGFRAFRICA >60.0 02/04/2022    EGFRNONAA >60.0 02/04/2022   Colonoscopy  Order: [id number]   Status: Final result     Visible to patient: Yes (seen)     Next appt: 06/03/2022 at 06:00 AM in Lab ([facility], [facility])     0 Result Notes     1  Topic           Narrative  Performed by: [facility]  Patient Name: Sj Gonzalez   Procedure Date: 9/25/2020 1:56 PM   MRN: [id number]   Account Number: [id number]   YOB: 1955   Age: 65   Room: Room 1   Gender: Male   Attending MD: Vicky Hodges MD   Procedure:            Colonoscopy   Indications:          Screening for colorectal malignant neoplasm   Providers:            Vicky Hodges MD, Allison Desai RN, Bala Murphy, Technician   Referring MD:         ORLIN Iverson   Complications:        No immediate complications. Estimated blood loss:                         None.   Estimated Blood Loss: Estimated blood loss: none.   Medicines:            General Anesthesia   Procedure:            Pre-Anesthesia Assessment:                         - Prior to the procedure, a History and Physical                         was performed, and patient medications and                         allergies were reviewed. The patient is competent.                         The risks and benefits of the procedure and the                         sedation options and risks were discussed with the                         patient. All questions were answered and informed                         consent was obtained. Patient identification and                          proposed procedure were verified by the physician,                         the nurse, the anesthesiologist and the anesthetist                         in the pre-procedure area in the procedure room.                         Mental Status Examination: alert and oriented.                         Airway Examination: normal oropharyngeal airway and                         neck mobility. Respiratory Examination: clear to                         auscultation. CV Examination: normal. Prophylactic                         Antibiotics: The patient does not require                         prophylactic antibiotics. Prior Anticoagulants: The                         patient has taken no previous anticoagulant or                         antiplatelet agents except for aspirin. ASA Grade                         Assessment: II - A patient with mild systemic                         disease. After reviewing the risks and benefits,                         the patient was deemed in satisfactory condition to                         undergo the procedure. The anesthesia plan was to                         use general anesthesia. Immediately prior to                         administration of medications, the patient was                         re-assessed for adequacy to receive sedatives. The                         heart rate, respiratory rate, oxygen saturations,                         blood pressure, adequacy of pulmonary ventilation,                         and response to care were monitored throughout the                         procedure. The physical status of the patient was                         re-assessed after the procedure.                         After I obtained informed consent, the scope was                         passed under direct vision. Throughout the                         procedure, the patient's blood pressure, pulse, and                         oxygen saturations were monitored continuously.                          The Olympus scope CF-ZW598V ([id number]) was                         introduced through the anus and advanced to the                         cecum, identified by appendiceal orifice and                         ileocecal valve. The colonoscopy was performed                         without difficulty. The patient tolerated the                         procedure well. The quality of the bowel                         preparation was adequate. The ileocecal valve,                         appendiceal orifice, and rectum were photographed.   Findings:       The perianal and digital rectal examinations were normal.        Multiple diverticula were found in the entire colon.        Internal hemorrhoids were found during retroflexion. The hemorrhoids        were medium-sized and Grade I (internal hemorrhoids that do not        prolapse).   Impression:           - Severe diverticulosis in the entire examined                         colon.                         - Internal hemorrhoids.                         - No specimens collected.   Recommendation:       - Discharge patient to home.                         - Patient has a contact number available for                         emergencies. The signs and symptoms of potential                         delayed complications were discussed with the                         patient. Return to normal activities tomorrow.                         Written discharge instructions were provided to the                         patient.                         - High fiber diet. Add a fiber supplement such as                         fiber gummies or Benefiber daily.                         - Continue present medications.                         - Repeat colonoscopy in 10 years for screening                         purposes.                         - Return to GI clinic PRN.   MD Vicky Eason MD   9/25/2020 2:30:18 PM   This report has been verified and signed  electronically.   Number of Addenda: 0   Note Initiated On: 9/25/2020 1:56 PM        This report has been verified and signed electronically.           8/24/2020 Routine     Narrative & Impression  EXAMINATION:  US ABDOMEN COMPLETE     CLINICAL HISTORY:  Generalized abdominal pain     TECHNIQUE:  Complete abdominal ultrasound (including pancreas, aorta, liver, gallbladder, common bile duct, IVC, kidneys, and spleen) was performed.     FINDINGS:  The visualized portion of the pancreas, aorta, and IVC are unremarkable.  Portions are obscured due to overlying bowel gas.  The gallbladder is unremarkable.  There is no biliary ductal dilatation.  The liver measures 19.7 cm and demonstrates an overall increased echogenicity consistent with a diffuse infiltrative process such as diffuse fatty infiltration.  There is focal fatty sparing adjacent to the gallbladder.  The right kidney measures 11.7 cm and is unremarkable.  The spleen measures 4.2 cm and is unremarkable.  The left kidney measures 11.7 cm and is unremarkable.     Impression:     Enlarged fatty liver.        Electronically signed by: Babatunde Gillis MD  Date:                                            08/24/2020  Time:                                           15:57  8/24/2020 Routine     Narrative & Impression  EXAMINATION:  XR ABDOMEN FLAT AND ERECT     CLINICAL HISTORY:  Generalized abdominal pain     TECHNIQUE:  Flat and erect AP views of the abdomen were performed.     FINDINGS:  There is no free intraperitoneal air.  The lung bases are clear.  There are no dilated loops of bowel.     Impression:     As above.        Electronically signed by: Babatunde Gillis MD  Date:                                            08/24/2020  Time:                                           11:02      ASSESSMENT/PLAN:  Problem List Items Addressed This Visit        ENT    Benign paroxysmal positional vertigo due to bilateral vestibular disorder - Primary    Overview     - supportive  care  - may be secondary to barotrauma but no perforation of the tympanic membranes I recommend that he avoid using the Valsalva maneuver to clears years  - bilateral cerumen clear to both ears and already seem to improve  - meclizine as needed           Relevant Medications    meclizine (ANTIVERT) 25 mg tablet    Impacted cerumen of right ear    Overview     - successfully removed with irrigation and curette           Relevant Orders    Ear wax removal (Completed)       Endocrine    Type 2 diabetes mellitus with diabetic peripheral angiopathy and gangrene, without long-term current use of insulin    Overview     - followed by Endocrinology  Lab Results   Component Value Date    HGBA1C 6.8 (H) 02/04/2022     - near goal <7%  - I would like to obtain his Trulicity to 1.5 mg daily and stop his pioglitazone however cost may be an issue.  If he is unable to the afford Trulicity then I recommend increase his pioglitazone to 30 mg daily.  - continue Basaglar 25 units daily   - counseling on diet, exercise and weight loss goal           Relevant Orders    Ambulatory referral/consult to Optometry    Type 2 diabetes mellitus with foot ulcer, with long-term current use of insulin    Overview     Lab Results   Component Value Date    HGBA1C 7.6 (H) 11/01/2021     - followed by Podiatry  - +PAD           Relevant Orders    Ambulatory referral/consult to Optometry       GI    Generalized abdominal pain    Overview     - unknown etiology  - reviewed x-ray, abdominal ultrasound and colonoscopy  - recommend CT scan abdomen pelvis  - recommend re-evaluation by gastroenterology           Relevant Orders    CT Abdomen Pelvis With Contrast    Creatinine, Serum    Ambulatory referral/consult to Gastroenterology       Other    Hypertension associated with type 2 diabetes mellitus    Overview     - well controlled  - continue current medications                 ORDERS:   Orders Placed This Encounter    CT Abdomen Pelvis With Contrast     Creatinine, Serum    Ambulatory referral/consult to Optometry    Ambulatory referral/consult to Gastroenterology    Ear wax removal    meclizine (ANTIVERT) 25 mg tablet       Vaccines recommended:  COVID-19  Optional shingles vaccine available at pharmacy    Follow-up  as previously scheduled or sooner if any concerns.      This note is dictated using the M*Modal Fluency Direct word recognition program. There are word recognition mistakes that are occasionally missed on review.    Dr. Idalia Alexandra D.O.   Family Medicine

## 2022-03-15 NOTE — TELEPHONE ENCOUNTER
----- Message from Vilma Lester sent at 3/15/2022  8:10 AM CDT -----  Contact: [phone]  Type:  Same Day Appointment Request    Caller is requesting a same day appointment.  Caller declined first available appointment listed below.    Name of Caller:Pt  When is the first available appointment?3/16  Symptoms: pt has been dizzy for the last 2 days and would like to have orders for Blood work as well  Best Call Back Number:[phone]           unknown

## 2022-03-16 ENCOUNTER — OFFICE VISIT (OUTPATIENT)
Dept: FAMILY MEDICINE | Facility: CLINIC | Age: 67
End: 2022-03-16
Payer: COMMERCIAL

## 2022-03-16 VITALS — SYSTOLIC BLOOD PRESSURE: 130 MMHG | DIASTOLIC BLOOD PRESSURE: 83 MMHG | HEART RATE: 83 BPM

## 2022-03-16 DIAGNOSIS — E11.52 TYPE 2 DIABETES MELLITUS WITH DIABETIC PERIPHERAL ANGIOPATHY AND GANGRENE, WITHOUT LONG-TERM CURRENT USE OF INSULIN: ICD-10-CM

## 2022-03-16 DIAGNOSIS — E11.59 HYPERTENSION ASSOCIATED WITH TYPE 2 DIABETES MELLITUS: ICD-10-CM

## 2022-03-16 DIAGNOSIS — I15.2 HYPERTENSION ASSOCIATED WITH TYPE 2 DIABETES MELLITUS: ICD-10-CM

## 2022-03-16 DIAGNOSIS — L97.509 TYPE 2 DIABETES MELLITUS WITH FOOT ULCER, WITH LONG-TERM CURRENT USE OF INSULIN: ICD-10-CM

## 2022-03-16 DIAGNOSIS — H61.21 IMPACTED CERUMEN OF RIGHT EAR: ICD-10-CM

## 2022-03-16 DIAGNOSIS — R10.84 GENERALIZED ABDOMINAL PAIN: ICD-10-CM

## 2022-03-16 DIAGNOSIS — E11.621 TYPE 2 DIABETES MELLITUS WITH FOOT ULCER, WITH LONG-TERM CURRENT USE OF INSULIN: ICD-10-CM

## 2022-03-16 DIAGNOSIS — H81.13 BENIGN PAROXYSMAL POSITIONAL VERTIGO DUE TO BILATERAL VESTIBULAR DISORDER: Primary | ICD-10-CM

## 2022-03-16 DIAGNOSIS — Z79.4 TYPE 2 DIABETES MELLITUS WITH FOOT ULCER, WITH LONG-TERM CURRENT USE OF INSULIN: ICD-10-CM

## 2022-03-16 PROBLEM — R42 VERTIGO: Status: ACTIVE | Noted: 2022-03-16

## 2022-03-16 PROCEDURE — 3061F NEG MICROALBUMINURIA REV: CPT | Mod: CPTII,95,, | Performed by: FAMILY MEDICINE

## 2022-03-16 PROCEDURE — 3044F PR MOST RECENT HEMOGLOBIN A1C LEVEL <7.0%: ICD-10-PCS | Mod: CPTII,95,, | Performed by: FAMILY MEDICINE

## 2022-03-16 PROCEDURE — 3075F PR MOST RECENT SYSTOLIC BLOOD PRESS GE 130-139MM HG: ICD-10-PCS | Mod: CPTII,95,, | Performed by: FAMILY MEDICINE

## 2022-03-16 PROCEDURE — 3066F NEPHROPATHY DOC TX: CPT | Mod: CPTII,95,, | Performed by: FAMILY MEDICINE

## 2022-03-16 PROCEDURE — 1159F PR MEDICATION LIST DOCUMENTED IN MEDICAL RECORD: ICD-10-PCS | Mod: CPTII,95,, | Performed by: FAMILY MEDICINE

## 2022-03-16 PROCEDURE — 3079F DIAST BP 80-89 MM HG: CPT | Mod: CPTII,95,, | Performed by: FAMILY MEDICINE

## 2022-03-16 PROCEDURE — 99214 PR OFFICE/OUTPT VISIT, EST, LEVL IV, 30-39 MIN: ICD-10-PCS | Mod: 95,,, | Performed by: FAMILY MEDICINE

## 2022-03-16 PROCEDURE — 1160F RVW MEDS BY RX/DR IN RCRD: CPT | Mod: CPTII,95,, | Performed by: FAMILY MEDICINE

## 2022-03-16 PROCEDURE — 3061F PR NEG MICROALBUMINURIA RESULT DOCUMENTED/REVIEW: ICD-10-PCS | Mod: CPTII,95,, | Performed by: FAMILY MEDICINE

## 2022-03-16 PROCEDURE — 3044F HG A1C LEVEL LT 7.0%: CPT | Mod: CPTII,95,, | Performed by: FAMILY MEDICINE

## 2022-03-16 PROCEDURE — 3066F PR DOCUMENTATION OF TREATMENT FOR NEPHROPATHY: ICD-10-PCS | Mod: CPTII,95,, | Performed by: FAMILY MEDICINE

## 2022-03-16 PROCEDURE — 1160F PR REVIEW ALL MEDS BY PRESCRIBER/CLIN PHARMACIST DOCUMENTED: ICD-10-PCS | Mod: CPTII,95,, | Performed by: FAMILY MEDICINE

## 2022-03-16 PROCEDURE — 1159F MED LIST DOCD IN RCRD: CPT | Mod: CPTII,95,, | Performed by: FAMILY MEDICINE

## 2022-03-16 PROCEDURE — 99214 OFFICE O/P EST MOD 30 MIN: CPT | Mod: 95,,, | Performed by: FAMILY MEDICINE

## 2022-03-16 PROCEDURE — 3079F PR MOST RECENT DIASTOLIC BLOOD PRESSURE 80-89 MM HG: ICD-10-PCS | Mod: CPTII,95,, | Performed by: FAMILY MEDICINE

## 2022-03-16 PROCEDURE — 3075F SYST BP GE 130 - 139MM HG: CPT | Mod: CPTII,95,, | Performed by: FAMILY MEDICINE

## 2022-03-16 RX ORDER — MECLIZINE HYDROCHLORIDE 25 MG/1
25 TABLET ORAL 3 TIMES DAILY PRN
Qty: 30 TABLET | Refills: 0 | Status: SHIPPED | OUTPATIENT
Start: 2022-03-16 | End: 2022-03-16

## 2022-03-16 RX ORDER — MECLIZINE HYDROCHLORIDE 25 MG/1
25 TABLET ORAL 3 TIMES DAILY PRN
Qty: 30 TABLET | Refills: 0 | Status: SHIPPED | OUTPATIENT
Start: 2022-03-16 | End: 2022-03-17 | Stop reason: SDUPTHER

## 2022-03-17 ENCOUNTER — PATIENT MESSAGE (OUTPATIENT)
Dept: FAMILY MEDICINE | Facility: CLINIC | Age: 67
End: 2022-03-17
Payer: COMMERCIAL

## 2022-03-17 DIAGNOSIS — H81.13 BENIGN PAROXYSMAL POSITIONAL VERTIGO DUE TO BILATERAL VESTIBULAR DISORDER: ICD-10-CM

## 2022-03-17 RX ORDER — MECLIZINE HYDROCHLORIDE 25 MG/1
25 TABLET ORAL 3 TIMES DAILY PRN
Qty: 30 TABLET | Refills: 0 | Status: SHIPPED | OUTPATIENT
Start: 2022-03-17 | End: 2022-09-09 | Stop reason: ALTCHOICE

## 2022-03-17 NOTE — TELEPHONE ENCOUNTER
Please call pharmacy to confirm they received since sent yesterday. I resent    Orders Placed This Encounter    meclizine (ANTIVERT) 25 mg tablet     Dr. Idalia Alexandra D.O.   Family Medicine

## 2022-03-17 NOTE — TELEPHONE ENCOUNTER
No new care gaps identified.  Powered by [employer]. Reference number: [id number].   3/17/2022 9:03:15 AM CDT   Caron from admitting

## 2022-03-22 ENCOUNTER — PATIENT OUTREACH (OUTPATIENT)
Dept: ADMINISTRATIVE | Facility: OTHER | Age: 67
End: 2022-03-22
Payer: COMMERCIAL

## 2022-03-23 NOTE — PROGRESS NOTES
Health Maintenance Due   Topic Date Due    COVID-19 Vaccine (1) Never done    HIV Screening  Never done    Aspirin/Antiplatelet Therapy  Never done    Shingles Vaccine (1 of 2) Never done    Influenza Vaccine (1) 09/01/2021    Eye Exam  02/15/2022     Updates were requested from care everywhere.  Chart was reviewed for overdue Proactive [facility] Encounters (HILARIO) topics (CRS, Breast Cancer Screening, Eye exam)  Health Maintenance has been updated.  LINKS immunization registry triggered.  Immunizations were reconciled.

## 2022-03-24 ENCOUNTER — PATIENT MESSAGE (OUTPATIENT)
Dept: FAMILY MEDICINE | Facility: CLINIC | Age: 67
End: 2022-03-24
Payer: COMMERCIAL

## 2022-03-24 ENCOUNTER — OFFICE VISIT (OUTPATIENT)
Dept: GASTROENTEROLOGY | Facility: CLINIC | Age: 67
End: 2022-03-24
Payer: COMMERCIAL

## 2022-03-24 VITALS
HEART RATE: 74 BPM | HEIGHT: 73 IN | WEIGHT: 264.69 LBS | DIASTOLIC BLOOD PRESSURE: 78 MMHG | BODY MASS INDEX: 35.08 KG/M2 | SYSTOLIC BLOOD PRESSURE: 132 MMHG

## 2022-03-24 DIAGNOSIS — R10.84 GENERALIZED ABDOMINAL PAIN: ICD-10-CM

## 2022-03-24 DIAGNOSIS — R68.81 EARLY SATIETY: ICD-10-CM

## 2022-03-24 DIAGNOSIS — R10.31 RIGHT GROIN PAIN: Primary | ICD-10-CM

## 2022-03-24 PROCEDURE — 3066F NEPHROPATHY DOC TX: CPT | Mod: CPTII,S$GLB,, | Performed by: NURSE PRACTITIONER

## 2022-03-24 PROCEDURE — 1160F RVW MEDS BY RX/DR IN RCRD: CPT | Mod: CPTII,S$GLB,, | Performed by: NURSE PRACTITIONER

## 2022-03-24 PROCEDURE — 1159F MED LIST DOCD IN RCRD: CPT | Mod: CPTII,S$GLB,, | Performed by: NURSE PRACTITIONER

## 2022-03-24 PROCEDURE — 3078F DIAST BP <80 MM HG: CPT | Mod: CPTII,S$GLB,, | Performed by: NURSE PRACTITIONER

## 2022-03-24 PROCEDURE — 3044F HG A1C LEVEL LT 7.0%: CPT | Mod: CPTII,S$GLB,, | Performed by: NURSE PRACTITIONER

## 2022-03-24 PROCEDURE — 3066F PR DOCUMENTATION OF TREATMENT FOR NEPHROPATHY: ICD-10-PCS | Mod: CPTII,S$GLB,, | Performed by: NURSE PRACTITIONER

## 2022-03-24 PROCEDURE — 99999 PR PBB SHADOW E&M-EST. PATIENT-LVL V: CPT | Mod: PBBFAC,,, | Performed by: NURSE PRACTITIONER

## 2022-03-24 PROCEDURE — 3044F PR MOST RECENT HEMOGLOBIN A1C LEVEL <7.0%: ICD-10-PCS | Mod: CPTII,S$GLB,, | Performed by: NURSE PRACTITIONER

## 2022-03-24 PROCEDURE — 3061F NEG MICROALBUMINURIA REV: CPT | Mod: CPTII,S$GLB,, | Performed by: NURSE PRACTITIONER

## 2022-03-24 PROCEDURE — 3008F BODY MASS INDEX DOCD: CPT | Mod: CPTII,S$GLB,, | Performed by: NURSE PRACTITIONER

## 2022-03-24 PROCEDURE — 99214 PR OFFICE/OUTPT VISIT, EST, LEVL IV, 30-39 MIN: ICD-10-PCS | Mod: S$GLB,,, | Performed by: NURSE PRACTITIONER

## 2022-03-24 PROCEDURE — 3075F SYST BP GE 130 - 139MM HG: CPT | Mod: CPTII,S$GLB,, | Performed by: NURSE PRACTITIONER

## 2022-03-24 PROCEDURE — 1160F PR REVIEW ALL MEDS BY PRESCRIBER/CLIN PHARMACIST DOCUMENTED: ICD-10-PCS | Mod: CPTII,S$GLB,, | Performed by: NURSE PRACTITIONER

## 2022-03-24 PROCEDURE — 3061F PR NEG MICROALBUMINURIA RESULT DOCUMENTED/REVIEW: ICD-10-PCS | Mod: CPTII,S$GLB,, | Performed by: NURSE PRACTITIONER

## 2022-03-24 PROCEDURE — 99214 OFFICE O/P EST MOD 30 MIN: CPT | Mod: S$GLB,,, | Performed by: NURSE PRACTITIONER

## 2022-03-24 PROCEDURE — 99999 PR PBB SHADOW E&M-EST. PATIENT-LVL V: ICD-10-PCS | Mod: PBBFAC,,, | Performed by: NURSE PRACTITIONER

## 2022-03-24 PROCEDURE — 3078F PR MOST RECENT DIASTOLIC BLOOD PRESSURE < 80 MM HG: ICD-10-PCS | Mod: CPTII,S$GLB,, | Performed by: NURSE PRACTITIONER

## 2022-03-24 PROCEDURE — 3008F PR BODY MASS INDEX (BMI) DOCUMENTED: ICD-10-PCS | Mod: CPTII,S$GLB,, | Performed by: NURSE PRACTITIONER

## 2022-03-24 PROCEDURE — 1159F PR MEDICATION LIST DOCUMENTED IN MEDICAL RECORD: ICD-10-PCS | Mod: CPTII,S$GLB,, | Performed by: NURSE PRACTITIONER

## 2022-03-24 PROCEDURE — 3075F PR MOST RECENT SYSTOLIC BLOOD PRESS GE 130-139MM HG: ICD-10-PCS | Mod: CPTII,S$GLB,, | Performed by: NURSE PRACTITIONER

## 2022-03-24 NOTE — PROGRESS NOTES
Subjective:       Patient ID: Sj Gonzalez is a 66 y.o. male.    Chief Complaint: Abdominal Pain and Bloated    65 y/o male hypertension, type 2 diabetes, hx of right inguinal hernia repair (9/2021) presents to clinic with c/o early satiety and lower abdominal pain. Patient is concerned about hernia recurrence. Has bulge to right groin with moderate pain on palpation and movement. Also reports feeling bloated and early satiety. Able to tolerate 50 % of normal meal. Occasional heartburn without nausea or vomiting. Denies constipation or diarrhea. No hematochezia or melena.       Past Medical History:   Diagnosis Date    Diabetes mellitus     Heart murmur     MVP    Hypertension     Non-recurrent unilateral inguinal hernia without obstruction or gangrene 9/30/2021    Urinary tract infection        Past Surgical History:   Procedure Laterality Date    AORTOGRAPHY WITH SERIALOGRAPHY N/A 11/4/2021    Procedure: AORTOGRAM, WITH SERIALOGRAPHY;  Surgeon: Jeovany Hart III, MD;  Location: [facility] CATH LAB;  Service: Cardiology;  Laterality: N/A;    ARTHROSCOPY OF KNEE Right     COLONOSCOPY N/A 9/25/2020    Procedure: COLONOSCOPY;  Surgeon: Vicky Hodges MD;  Location: [facility] ENDO;  Service: Endoscopy;  Laterality: N/A;    HERNIA REPAIR      ROBOT-ASSISTED LAPAROSCOPIC REPAIR OF INGUINAL HERNIA Right 9/30/2021    Procedure: ROBOTIC REPAIR, HERNIA, INGUINAL;  Surgeon: Carmelo Farooq MD;  Location: [facility] OR;  Service: General;  Laterality: Right;    SPINE SURGERY      Lumbar       Family History   Problem Relation Age of Onset    No Known Problems Mother     No Known Problems Father     Diabetes Sister     Diabetes Brother     No Known Problems Daughter     Cancer Neg Hx     Heart disease Neg Hx     Prostate cancer Neg Hx     Kidney disease Neg Hx        Social History     Socioeconomic History    Marital status:    Tobacco Use    Smoking status: Never Smoker    Smokeless tobacco: Never  Used   Substance and Sexual Activity    Alcohol use: Yes     Alcohol/week: 0.0 standard drinks     Comment: daily    Drug use: No    Sexual activity: Yes     Partners: Female   Social History Narrative    He lives in [facility]. He lives with his wife and 2 children. His children are attending [employer]. He lived [place]. . Nonsmoker. Social drinker. 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 His son attended [employer].      Social Determinants of Health     Financial Resource Strain: Low Risk     Difficulty of Paying Living Expenses: Not hard at all   Food Insecurity: No Food Insecurity    Worried About Running Out of Food in the Last Year: Never true    Ran Out of Food in the Last Year: Never true   Transportation Needs: No Transportation Needs    Lack of Transportation (Medical): No    Lack of Transportation (Non-Medical): No   Physical Activity: Sufficiently Active    Days of Exercise per Week: 5 days    Minutes of Exercise per Session: 30 min   Stress: No Stress Concern Present    Feeling of Stress : Not at all   Social Connections: Unknown    Frequency of Communication with Friends and Family: More than three times a week    Frequency of Social Gatherings with Friends and Family: More than three times a week    Active Member of Clubs or Organizations: Yes    Attends Club or Organization Meetings: More than 4 times per year    Marital Status:    Housing Stability: Unknown    Unable to Pay for Housing in the Last Year: No    Unstable Housing in the Last Year: No       Review of Systems   Constitutional: Positive for appetite change. Negative for fatigue, fever and unexpected weight change.   HENT: Negative for trouble swallowing.    Respiratory: Negative for shortness of breath.    Cardiovascular: Negative for chest pain.   Gastrointestinal: Positive for abdominal pain. Negative for nausea and vomiting.   Genitourinary: Negative for dysuria.   Neurological: Negative for dizziness  "and weakness.   Hematological: Negative for adenopathy. Does not bruise/bleed easily.   Psychiatric/Behavioral: Negative for dysphoric mood.         Objective:     Vitals:    03/24/22 0934   BP: 132/78   Pulse: 74   Weight: 120.1 kg (264 lb 11.2 oz)   Height: 6' 1" (1.854 m)          Physical Exam  Constitutional:       General: He is not in acute distress.     Appearance: Normal appearance. He is not ill-appearing.   HENT:      Head: Normocephalic.   Eyes:      Conjunctiva/sclera: Conjunctivae normal.      Pupils: Pupils are equal, round, and reactive to light.   Abdominal:      General: Abdomen is protuberant. Bowel sounds are normal.   Genitourinary:      Musculoskeletal:         General: Normal range of motion.      Cervical back: Normal range of motion.   Skin:     General: Skin is warm and dry.   Neurological:      Mental Status: He is alert and oriented to person, place, and time.   Psychiatric:         Mood and Affect: Mood normal.         Behavior: Behavior normal.               Assessment:         ICD-10-CM ICD-9-CM   1. Right groin pain  R10.31 789.03   2. Early satiety  R68.81 780.94   3. Generalized abdominal pain  R10.84 789.07       Plan:       Right groin pain  -     US Soft Tissue, Groin Right; Future; Expected date: 03/24/2022  -     Advised to follow up with general surgery    Early satiety; Generalized abdominal pain  -     Recommend EGD and possible GES. Patient declined to schedule      Follow up if symptoms worsen or fail to improve.     Patient's Medications   New Prescriptions    No medications on file   Previous Medications    AMLODIPINE (NORVASC) 10 MG TABLET    Take 1 tablet (10 mg total) by mouth once daily.    ASPIRIN 81 MG CHEW    Take 1 tablet (81 mg total) by mouth once daily.    BLOOD SUGAR DIAGNOSTIC STRP    Test blood sugar 3 (three) times daily before meals.    FLASH GLUCOSE SCANNING READER (FREESTYLE MAKI 14 DAY READER) MISC    use once daily    FLASH GLUCOSE SENSOR (FREESTYLE " "MAKI 14 DAY SENSOR) KIT    use and change every 14 days    INSULIN (BASAGLAR KWIKPEN U-100 INSULIN) GLARGINE 100 UNITS/ML (3ML) SUBQ PEN    Inject 25 Units into the skin once daily.    LOSARTAN-HYDROCHLOROTHIAZIDE 100-25 MG (HYZAAR) 100-25 MG PER TABLET    Take 1 tablet by mouth once daily.    MECLIZINE (ANTIVERT) 25 MG TABLET    Take 1 tablet (25 mg total) by mouth 3 (three) times daily as needed for Dizziness.    PEN NEEDLE, DIABETIC 32 GAUGE X 5/32" NDLE    Use nightly as directed with insulin pen.    PIOGLITAZONE (ACTOS) 30 MG TABLET    Take 1 tablet (30 mg total) by mouth once daily.    ROSUVASTATIN (CRESTOR) 20 MG TABLET    Take 1 tablet (20 mg total) by mouth every evening.   Modified Medications    No medications on file   Discontinued Medications    No medications on file           "

## 2022-04-08 ENCOUNTER — PATIENT MESSAGE (OUTPATIENT)
Dept: FAMILY MEDICINE | Facility: CLINIC | Age: 67
End: 2022-04-08
Payer: COMMERCIAL

## 2022-04-08 DIAGNOSIS — E66.09 CLASS 1 OBESITY DUE TO EXCESS CALORIES WITH SERIOUS COMORBIDITY AND BODY MASS INDEX (BMI) OF 33.0 TO 33.9 IN ADULT: ICD-10-CM

## 2022-04-08 DIAGNOSIS — E11.52 TYPE 2 DIABETES MELLITUS WITH DIABETIC PERIPHERAL ANGIOPATHY AND GANGRENE, WITHOUT LONG-TERM CURRENT USE OF INSULIN: Primary | ICD-10-CM

## 2022-04-11 ENCOUNTER — TELEPHONE (OUTPATIENT)
Dept: FAMILY MEDICINE | Facility: CLINIC | Age: 67
End: 2022-04-11
Payer: COMMERCIAL

## 2022-04-11 NOTE — PROGRESS NOTES
FAMILY MEDICINE  [facility]    Reason for visit:   Chief Complaint   Patient presents with    Sore Throat    Cough    Sinus Problem    Fatigue     Exhaustion, for about a week.  Sinus issues, cough, sorethroat since Saturday.       HPI: Sj Gonzalez is a 66 y.o. male  - obesity, hypertension, hyperlipidemia, type 2 diabetes, lumbar degenerative disc disease, and peripheral arterial disease reports that he is feeling ill     Cardiology: Dr. Jeovany Hart   Endocrinology Dr. Burgess  Podiatry Dr. Zepeda    Today Sj Gonzalez reports that he has sinus congestion, sore throat, chills and cough that started 4/9/2022.  Reports he has recently been traveling for work and was in [place].  He feels like he just got himself ran down.  However on Saturday he started having congestion, sore throat and chills.  He feels very fatigued. Denies any known fevers, nausea, vomiting, chest pains or shortness of breath.  He denies any loss of sense of taste or smell.  His cough is hacking but not productive.            Review of Systems   All other systems reviewed and are negative.      HISTORY:   Past Medical History:   Diagnosis Date    Diabetes mellitus     Heart murmur     MVP    Hypertension     Non-recurrent unilateral inguinal hernia without obstruction or gangrene 9/30/2021    Urinary tract infection        Past Surgical History:   Procedure Laterality Date    AORTOGRAPHY WITH SERIALOGRAPHY N/A 11/4/2021    Procedure: AORTOGRAM, WITH SERIALOGRAPHY;  Surgeon: Jeovany Hart III, MD;  Location: [facility] CATH LAB;  Service: Cardiology;  Laterality: N/A;    ARTHROSCOPY OF KNEE Right     COLONOSCOPY N/A 9/25/2020    Procedure: COLONOSCOPY;  Surgeon: Vicky Hodges MD;  Location: [facility] ENDO;  Service: Endoscopy;  Laterality: N/A;    HERNIA REPAIR      ROBOT-ASSISTED LAPAROSCOPIC REPAIR OF INGUINAL HERNIA Right 9/30/2021    Procedure: ROBOTIC REPAIR, HERNIA, INGUINAL;  Surgeon: Carmelo JOHNS  "MD Capri;  Location: [facility] OR;  Service: General;  Laterality: Right;    SPINE SURGERY      Lumbar       Family History   Problem Relation Age of Onset    No Known Problems Mother     No Known Problems Father     Diabetes Sister     Diabetes Brother     No Known Problems Daughter     Cancer Neg Hx     Heart disease Neg Hx     Prostate cancer Neg Hx     Kidney disease Neg Hx        Social History     Tobacco Use    Smoking status: Never Smoker    Smokeless tobacco: Never Used   Substance Use Topics    Alcohol use: Yes     Alcohol/week: 0.0 standard drinks     Comment: daily    Drug use: No       Social History     Social History Narrative    He lives in [place] [facility]. He lives with his wife and 2 children. His children are attending [employer]. [place]. . Nonsmoker. Social drinker. 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 His son attended [employer].        ALLERGIES:   Review of patient's allergies indicates:   Allergen Reactions    Metformin      GI intolerance    Lisinopril        MEDS:     Current Outpatient Medications:     amLODIPine (NORVASC) 10 MG tablet, Take 1 tablet (10 mg total) by mouth once daily., Disp: 90 tablet, Rfl: 0    blood sugar diagnostic Strp, Test blood sugar 3 (three) times daily before meals., Disp: 100 strip, Rfl: 11    flash glucose scanning reader (FREESTYLE MAKI 14 DAY READER) Misc, use once daily, Disp: 1 each, Rfl: 0    flash glucose sensor (FREESTYLE MAKI 14 DAY SENSOR) Kit, use and change every 14 days, Disp: 2 kit, Rfl: 11    insulin (BASAGLAR KWIKPEN U-100 INSULIN) glargine 100 units/mL (3mL) SubQ pen, Inject 25 Units into the skin once daily., Disp: 15 mL, Rfl: 6    losartan-hydrochlorothiazide 100-25 mg (HYZAAR) 100-25 mg per tablet, Take 1 tablet by mouth once daily., Disp: 90 tablet, Rfl: 3    pen needle, diabetic 32 gauge x 5/32" Ndle, Use nightly as directed with insulin pen., Disp: 100 each, Rfl: 5    pioglitazone (ACTOS) 30 MG " "tablet, Take 1 tablet (30 mg total) by mouth once daily., Disp: 90 tablet, Rfl: 1    rosuvastatin (CRESTOR) 20 MG tablet, Take 1 tablet (20 mg total) by mouth every evening., Disp: 90 tablet, Rfl: 3    aspirin 81 MG Chew, Take 1 tablet (81 mg total) by mouth once daily., Disp: 30 tablet, Rfl: 1    benzonatate (TESSALON) 200 MG capsule, Take 1 capsule (200 mg total) by mouth 3 (three) times daily as needed for Cough., Disp: 30 capsule, Rfl: 1    meclizine (ANTIVERT) 25 mg tablet, Take 1 tablet (25 mg total) by mouth 3 (three) times daily as needed for Dizziness. (Patient not taking: Reported on 4/12/2022), Disp: 30 tablet, Rfl: 0    nirmatrelvir-ritonavir 150 mg x 2- 100 mg copackaged tablets (EUA), Take 3 tablets by mouth 2 (two) times daily for 5 days. Each dose contains 2 nirmatrelvir (pink tablets) and 1 ritonavir (white tablet). Take all 3 tablets together, Disp: 30 tablet, Rfl: 0    Vital signs:   Vitals:    04/12/22 1140   BP: 132/72   Pulse: 89   SpO2: 97%   Weight: 117.4 kg (258 lb 12.8 oz)   Height: 6' 1" (1.854 m)     Body mass index is 34.14 kg/m².    PHYSICAL EXAM:     Physical Exam  Constitutional:       General: He is not in acute distress.  HENT:      Nose: Rhinorrhea present. Rhinorrhea is clear.   Cardiovascular:      Rate and Rhythm: Normal rate and regular rhythm.      Heart sounds: Normal heart sounds. No murmur heard.    No friction rub. No gallop.   Pulmonary:      Effort: Pulmonary effort is normal.      Breath sounds: Normal breath sounds. No wheezing, rhonchi or rales.   Musculoskeletal:      Cervical back: Neck supple.      Right lower leg: No edema.      Left lower leg: No edema.   Skin:     General: Skin is warm.   Neurological:      Mental Status: He is alert.           PHQ4 = Score: 0    PERTINENT RESULTS:   Office Visit on 04/12/2022   Component Date Value Ref Range Status    Rapid Influenza A Ag 04/12/2022 Negative  Negative Final    Rapid Influenza B Ag 04/12/2022 Negative  " Negative Final     Acceptable 04/12/2022 Yes   Final    POC Rapid COVID 04/12/2022 Positive (A) Negative Final     Acceptable 04/12/2022 Yes   Final       ASSESSMENT/PLAN:  Problem List Items Addressed This Visit        Other    COVID-19    Overview     - COVID-19 positive  - counseling on management COVID-19  - COVID risk score equals 5  - Recommend Paxlovid           Relevant Medications    nirmatrelvir-ritonavir 150 mg x 2- 100 mg copackaged tablets (EUA)    benzonatate (TESSALON) 200 MG capsule      Other Visit Diagnoses     Sore throat    -  Primary    Relevant Orders    POCT Influenza A/B (Completed)    POCT COVID-19 Rapid Screening (Completed)          ORDERS:   Orders Placed This Encounter    POCT Influenza A/B    POCT COVID-19 Rapid Screening    nirmatrelvir-ritonavir 150 mg x 2- 100 mg copackaged tablets (EUA)    benzonatate (TESSALON) 200 MG capsule       Vaccines recommended:  COVID-19 recover but patient declined    Follow-up 1-2 weeks virtual or sooner if any concerns.    This note is dictated using the M*Modal Fluency Direct word recognition program. There are word recognition mistakes that are occasionally missed on review.    Dr. Idalia Alexandra D.O.   Family Medicine

## 2022-04-11 NOTE — TELEPHONE ENCOUNTER
----- Message from Vilma Lester sent at 4/11/2022  8:41 AM CDT -----  Contact: Stephanie(Wife)-[phone]  Type:  Same Day Appointment Request    Caller is requesting a same day appointment.  Caller declined first available appointment listed below.    Name of Caller:Pt's Wife  When is the first available appointment?n/a  Symptoms: flu like Symptoms, pt has a Fever, body aches, chills and coughing  Best Call Back Number:[phone]

## 2022-04-12 ENCOUNTER — OFFICE VISIT (OUTPATIENT)
Dept: FAMILY MEDICINE | Facility: CLINIC | Age: 67
End: 2022-04-12
Payer: COMMERCIAL

## 2022-04-12 VITALS
WEIGHT: 258.81 LBS | SYSTOLIC BLOOD PRESSURE: 132 MMHG | HEIGHT: 73 IN | BODY MASS INDEX: 34.3 KG/M2 | DIASTOLIC BLOOD PRESSURE: 72 MMHG | OXYGEN SATURATION: 97 % | HEART RATE: 89 BPM

## 2022-04-12 DIAGNOSIS — J02.9 SORE THROAT: Primary | ICD-10-CM

## 2022-04-12 DIAGNOSIS — U07.1 COVID-19: ICD-10-CM

## 2022-04-12 PROBLEM — H61.21 IMPACTED CERUMEN OF RIGHT EAR: Status: RESOLVED | Noted: 2022-03-16 | Resolved: 2022-04-12

## 2022-04-12 LAB
CTP QC/QA: YES
CTP QC/QA: YES
FLUAV AG NPH QL: NEGATIVE
FLUBV AG NPH QL: NEGATIVE
SARS-COV-2 RDRP RESP QL NAA+PROBE: POSITIVE

## 2022-04-12 PROCEDURE — 87804 INFLUENZA ASSAY W/OPTIC: CPT | Mod: QW,S$GLB,, | Performed by: FAMILY MEDICINE

## 2022-04-12 PROCEDURE — 3061F NEG MICROALBUMINURIA REV: CPT | Mod: CPTII,S$GLB,, | Performed by: FAMILY MEDICINE

## 2022-04-12 PROCEDURE — 3066F PR DOCUMENTATION OF TREATMENT FOR NEPHROPATHY: ICD-10-PCS | Mod: CPTII,S$GLB,, | Performed by: FAMILY MEDICINE

## 2022-04-12 PROCEDURE — 3075F SYST BP GE 130 - 139MM HG: CPT | Mod: CPTII,S$GLB,, | Performed by: FAMILY MEDICINE

## 2022-04-12 PROCEDURE — 1159F PR MEDICATION LIST DOCUMENTED IN MEDICAL RECORD: ICD-10-PCS | Mod: CPTII,S$GLB,, | Performed by: FAMILY MEDICINE

## 2022-04-12 PROCEDURE — 3061F PR NEG MICROALBUMINURIA RESULT DOCUMENTED/REVIEW: ICD-10-PCS | Mod: CPTII,S$GLB,, | Performed by: FAMILY MEDICINE

## 2022-04-12 PROCEDURE — 3044F PR MOST RECENT HEMOGLOBIN A1C LEVEL <7.0%: ICD-10-PCS | Mod: CPTII,S$GLB,, | Performed by: FAMILY MEDICINE

## 2022-04-12 PROCEDURE — 3008F PR BODY MASS INDEX (BMI) DOCUMENTED: ICD-10-PCS | Mod: CPTII,S$GLB,, | Performed by: FAMILY MEDICINE

## 2022-04-12 PROCEDURE — 3078F PR MOST RECENT DIASTOLIC BLOOD PRESSURE < 80 MM HG: ICD-10-PCS | Mod: CPTII,S$GLB,, | Performed by: FAMILY MEDICINE

## 2022-04-12 PROCEDURE — U0002 COVID-19 LAB TEST NON-CDC: HCPCS | Mod: QW,S$GLB,, | Performed by: FAMILY MEDICINE

## 2022-04-12 PROCEDURE — 1159F MED LIST DOCD IN RCRD: CPT | Mod: CPTII,S$GLB,, | Performed by: FAMILY MEDICINE

## 2022-04-12 PROCEDURE — 3078F DIAST BP <80 MM HG: CPT | Mod: CPTII,S$GLB,, | Performed by: FAMILY MEDICINE

## 2022-04-12 PROCEDURE — 99214 PR OFFICE/OUTPT VISIT, EST, LEVL IV, 30-39 MIN: ICD-10-PCS | Mod: 25,S$GLB,, | Performed by: FAMILY MEDICINE

## 2022-04-12 PROCEDURE — 3008F BODY MASS INDEX DOCD: CPT | Mod: CPTII,S$GLB,, | Performed by: FAMILY MEDICINE

## 2022-04-12 PROCEDURE — 99214 OFFICE O/P EST MOD 30 MIN: CPT | Mod: 25,S$GLB,, | Performed by: FAMILY MEDICINE

## 2022-04-12 PROCEDURE — 3044F HG A1C LEVEL LT 7.0%: CPT | Mod: CPTII,S$GLB,, | Performed by: FAMILY MEDICINE

## 2022-04-12 PROCEDURE — 3066F NEPHROPATHY DOC TX: CPT | Mod: CPTII,S$GLB,, | Performed by: FAMILY MEDICINE

## 2022-04-12 PROCEDURE — U0002: ICD-10-PCS | Mod: QW,S$GLB,, | Performed by: FAMILY MEDICINE

## 2022-04-12 PROCEDURE — 87804 POCT INFLUENZA A/B: ICD-10-PCS | Mod: 59,QW,S$GLB, | Performed by: FAMILY MEDICINE

## 2022-04-12 PROCEDURE — 99999 PR PBB SHADOW E&M-EST. PATIENT-LVL IV: ICD-10-PCS | Mod: PBBFAC,,, | Performed by: FAMILY MEDICINE

## 2022-04-12 PROCEDURE — 99999 PR PBB SHADOW E&M-EST. PATIENT-LVL IV: CPT | Mod: PBBFAC,,, | Performed by: FAMILY MEDICINE

## 2022-04-12 PROCEDURE — 3075F PR MOST RECENT SYSTOLIC BLOOD PRESS GE 130-139MM HG: ICD-10-PCS | Mod: CPTII,S$GLB,, | Performed by: FAMILY MEDICINE

## 2022-04-12 RX ORDER — BENZONATATE 200 MG/1
200 CAPSULE ORAL 3 TIMES DAILY PRN
Qty: 30 CAPSULE | Refills: 1 | Status: SHIPPED | OUTPATIENT
Start: 2022-04-12 | End: 2022-04-22

## 2022-05-16 DIAGNOSIS — I10 ESSENTIAL HYPERTENSION: ICD-10-CM

## 2022-05-16 RX ORDER — AMLODIPINE BESYLATE 10 MG/1
TABLET ORAL
Refills: 0 | OUTPATIENT
Start: 2022-05-16

## 2022-05-16 NOTE — TELEPHONE ENCOUNTER
Care Due:                  Date            Visit Type   Department     Provider  --------------------------------------------------------------------------------                                EP -                              PRIMARY [facility]  Last Visit: 04-      CARE ([facility])   [facility] Etienne Alexandra                               -                              PRIMARY [facility]  Next Visit: 06-      [facility]                                                            Last  Test          Frequency    Reason                     Performed    Due Date  --------------------------------------------------------------------------------    HBA1C.......  6 months...  insulin, pioglitazone....  02- 08-    [facility] Embedded Care Gaps. Reference number: [id number]. 5/16/2022   3:32:15 PM CDT

## 2022-05-16 NOTE — TELEPHONE ENCOUNTER
[facility] Note  Quick DC. Inappropriate Request   Refill request requires further review by MD: NO   Medication Therapy Plan: Pharmacy is requesting new script(s) for the following medications without required information, (sig/ frequency/qty/etc)     [facility] action(s):  Quick Discontinue      Duplicate Pended Encounter(s)/ Last Prescribed Details:    Pharmacies have been requesting medications for patients without required information, (sig, frequency, qty, etc.). In addition, requests are sent for medication(s) pt. are currently not taking, and medications patients have never taken.    We have spoken to the pharmacies about these request types and advised their teams previously that we are unable to assess these New Script requests and require all details for these requests. This is a known issue and has been reported.        Medication related problems are not assessed for QDC.   Medication Reconciliation Completed? NO Were there pending details that required adjustment? NO     Automatic Epic Generated Protocol Data Below:   Requested Prescriptions   Pending Prescriptions Disp Refills    amLODIPine (NORVASC) 10 MG tablet [Pharmacy Med Name: AMLODIPINE BESYLATE TABS 10MG]  0              Appointments      Date Provider   Last Visit   4/12/2022 Idalia Alexandra DO   Next Visit   6/8/2022 Idalia Alexandra DO        Note composed:4:00 PM 05/16/2022

## 2022-05-25 ENCOUNTER — PATIENT MESSAGE (OUTPATIENT)
Dept: FAMILY MEDICINE | Facility: CLINIC | Age: 67
End: 2022-05-25
Payer: COMMERCIAL

## 2022-05-25 DIAGNOSIS — N52.9 ERECTILE DYSFUNCTION, UNSPECIFIED ERECTILE DYSFUNCTION TYPE: Primary | ICD-10-CM

## 2022-05-31 ENCOUNTER — TELEPHONE (OUTPATIENT)
Dept: FAMILY MEDICINE | Facility: CLINIC | Age: 67
End: 2022-05-31
Payer: COMMERCIAL

## 2022-05-31 ENCOUNTER — PATIENT MESSAGE (OUTPATIENT)
Dept: ADMINISTRATIVE | Facility: HOSPITAL | Age: 67
End: 2022-05-31
Payer: COMMERCIAL

## 2022-05-31 NOTE — TELEPHONE ENCOUNTER
Left message indicating the need to reschedule appt. Left our call back number and requested a call back.

## 2022-06-06 ENCOUNTER — PATIENT MESSAGE (OUTPATIENT)
Dept: FAMILY MEDICINE | Facility: CLINIC | Age: 67
End: 2022-06-06
Payer: COMMERCIAL

## 2022-06-06 DIAGNOSIS — E11.65 TYPE 2 DIABETES MELLITUS WITH HYPERGLYCEMIA, WITH LONG-TERM CURRENT USE OF INSULIN: ICD-10-CM

## 2022-06-06 DIAGNOSIS — Z79.4 TYPE 2 DIABETES MELLITUS WITH HYPERGLYCEMIA, WITH LONG-TERM CURRENT USE OF INSULIN: ICD-10-CM

## 2022-06-06 RX ORDER — FLASH GLUCOSE SENSOR
2 KIT MISCELLANEOUS
Qty: 2 KIT | Refills: 11 | Status: SHIPPED | OUTPATIENT
Start: 2022-06-06 | End: 2022-12-14 | Stop reason: SDUPTHER

## 2022-06-06 NOTE — TELEPHONE ENCOUNTER
----- Message from Rebecca Degroot sent at 2022  3:17 PM CDT -----  Needs advice from nurse:      Who Called:pt  Regarding:needs refill on flash glucose sensor (FREESTYLE MAKI 14 DAY SENSOR) Kit  use and change every 14 days, today as the current one will  today, needs 1 sent to [facility] pharmacy      Would the patient rather a call back or VIA [facility]?  Best Call Back number:[phone]  Additional Info:[id number] - [place], [place] - [address] [address] [facility] [address] (Ph: [phone])

## 2022-06-06 NOTE — TELEPHONE ENCOUNTER
No new care gaps identified.  [facility] Embedded Care Gaps. Reference number: [id number]. 6/06/2022   4:47:25 PM CDT

## 2022-06-08 ENCOUNTER — OFFICE VISIT (OUTPATIENT)
Dept: OPTOMETRY | Facility: CLINIC | Age: 67
End: 2022-06-08
Payer: COMMERCIAL

## 2022-06-08 DIAGNOSIS — I10 ESSENTIAL HYPERTENSION: ICD-10-CM

## 2022-06-08 DIAGNOSIS — E11.65 TYPE 2 DIABETES MELLITUS WITH HYPERGLYCEMIA, WITH LONG-TERM CURRENT USE OF INSULIN: ICD-10-CM

## 2022-06-08 DIAGNOSIS — L97.509 TYPE 2 DIABETES MELLITUS WITH FOOT ULCER, WITH LONG-TERM CURRENT USE OF INSULIN: ICD-10-CM

## 2022-06-08 DIAGNOSIS — E11.52 TYPE 2 DIABETES MELLITUS WITH DIABETIC PERIPHERAL ANGIOPATHY AND GANGRENE, WITHOUT LONG-TERM CURRENT USE OF INSULIN: ICD-10-CM

## 2022-06-08 DIAGNOSIS — Z79.4 TYPE 2 DIABETES MELLITUS WITH HYPERGLYCEMIA, WITH LONG-TERM CURRENT USE OF INSULIN: ICD-10-CM

## 2022-06-08 DIAGNOSIS — Z79.4 TYPE 2 DIABETES MELLITUS WITH FOOT ULCER, WITH LONG-TERM CURRENT USE OF INSULIN: ICD-10-CM

## 2022-06-08 DIAGNOSIS — H25.13 NS (NUCLEAR SCLEROSIS), BILATERAL: ICD-10-CM

## 2022-06-08 DIAGNOSIS — H52.03 HYPEROPIA, BILATERAL: ICD-10-CM

## 2022-06-08 DIAGNOSIS — H52.4 PRESBYOPIA: ICD-10-CM

## 2022-06-08 DIAGNOSIS — E11.621 TYPE 2 DIABETES MELLITUS WITH FOOT ULCER, WITH LONG-TERM CURRENT USE OF INSULIN: ICD-10-CM

## 2022-06-08 DIAGNOSIS — E11.9 TYPE 2 DIABETES MELLITUS WITHOUT OPHTHALMIC MANIFESTATIONS: Primary | ICD-10-CM

## 2022-06-08 PROCEDURE — 3061F NEG MICROALBUMINURIA REV: CPT | Mod: CPTII,S$GLB,, | Performed by: OPTOMETRIST

## 2022-06-08 PROCEDURE — 2023F DILAT RTA XM W/O RTNOPTHY: CPT | Mod: CPTII,S$GLB,, | Performed by: OPTOMETRIST

## 2022-06-08 PROCEDURE — 2023F PR DILATED RETINAL EXAM W/O EVID OF RETINOPATHY: ICD-10-PCS | Mod: CPTII,S$GLB,, | Performed by: OPTOMETRIST

## 2022-06-08 PROCEDURE — 1160F RVW MEDS BY RX/DR IN RCRD: CPT | Mod: CPTII,S$GLB,, | Performed by: OPTOMETRIST

## 2022-06-08 PROCEDURE — 99999 PR PBB SHADOW E&M-EST. PATIENT-LVL III: CPT | Mod: PBBFAC,,, | Performed by: OPTOMETRIST

## 2022-06-08 PROCEDURE — 92014 COMPRE OPH EXAM EST PT 1/>: CPT | Mod: S$GLB,,, | Performed by: OPTOMETRIST

## 2022-06-08 PROCEDURE — 3066F NEPHROPATHY DOC TX: CPT | Mod: CPTII,S$GLB,, | Performed by: OPTOMETRIST

## 2022-06-08 PROCEDURE — 3044F PR MOST RECENT HEMOGLOBIN A1C LEVEL <7.0%: ICD-10-PCS | Mod: CPTII,S$GLB,, | Performed by: OPTOMETRIST

## 2022-06-08 PROCEDURE — 1159F PR MEDICATION LIST DOCUMENTED IN MEDICAL RECORD: ICD-10-PCS | Mod: CPTII,S$GLB,, | Performed by: OPTOMETRIST

## 2022-06-08 PROCEDURE — 3061F PR NEG MICROALBUMINURIA RESULT DOCUMENTED/REVIEW: ICD-10-PCS | Mod: CPTII,S$GLB,, | Performed by: OPTOMETRIST

## 2022-06-08 PROCEDURE — 3066F PR DOCUMENTATION OF TREATMENT FOR NEPHROPATHY: ICD-10-PCS | Mod: CPTII,S$GLB,, | Performed by: OPTOMETRIST

## 2022-06-08 PROCEDURE — 3044F HG A1C LEVEL LT 7.0%: CPT | Mod: CPTII,S$GLB,, | Performed by: OPTOMETRIST

## 2022-06-08 PROCEDURE — 99999 PR PBB SHADOW E&M-EST. PATIENT-LVL III: ICD-10-PCS | Mod: PBBFAC,,, | Performed by: OPTOMETRIST

## 2022-06-08 PROCEDURE — 1159F MED LIST DOCD IN RCRD: CPT | Mod: CPTII,S$GLB,, | Performed by: OPTOMETRIST

## 2022-06-08 PROCEDURE — 92014 PR EYE EXAM, EST PATIENT,COMPREHESV: ICD-10-PCS | Mod: S$GLB,,, | Performed by: OPTOMETRIST

## 2022-06-08 PROCEDURE — 1160F PR REVIEW ALL MEDS BY PRESCRIBER/CLIN PHARMACIST DOCUMENTED: ICD-10-PCS | Mod: CPTII,S$GLB,, | Performed by: OPTOMETRIST

## 2022-06-08 NOTE — PROGRESS NOTES
HPI     67 Yo male is here for routine eye exam wi no C/o about ocular health   Pt denies pain and discomfort   No f/f    Eye med: no gtt    Last edited by Audi Weir MA on 6/8/2022 12:46 PM. (History)            Assessment /Plan     For exam results, see Encounter Report.       Type 2 diabetes mellitus with hyperglycemia, with long-term current use of insulin  Type 2 diabetes mellitus without ophthalmic manifestations  Essential hypertension              No retinopathy, monitor yearly              Discussed importance of A1c control     NS (nuclear sclerosis), bilateral              Mild, monitor     Presbyopia  Hyperopia, bilateral              OK to continue with OTC readers      RTC 1 year, sooner PRN

## 2022-06-14 ENCOUNTER — OFFICE VISIT (OUTPATIENT)
Dept: FAMILY MEDICINE | Facility: CLINIC | Age: 67
End: 2022-06-14
Payer: COMMERCIAL

## 2022-06-14 VITALS
WEIGHT: 262.5 LBS | BODY MASS INDEX: 34.79 KG/M2 | DIASTOLIC BLOOD PRESSURE: 80 MMHG | HEART RATE: 86 BPM | HEIGHT: 73 IN | OXYGEN SATURATION: 97 % | SYSTOLIC BLOOD PRESSURE: 138 MMHG

## 2022-06-14 DIAGNOSIS — E11.52 TYPE 2 DIABETES MELLITUS WITH DIABETIC PERIPHERAL ANGIOPATHY AND GANGRENE, WITHOUT LONG-TERM CURRENT USE OF INSULIN: ICD-10-CM

## 2022-06-14 DIAGNOSIS — E66.09 CLASS 1 OBESITY DUE TO EXCESS CALORIES WITH SERIOUS COMORBIDITY AND BODY MASS INDEX (BMI) OF 33.0 TO 33.9 IN ADULT: Primary | ICD-10-CM

## 2022-06-14 DIAGNOSIS — E11.59 HYPERTENSION ASSOCIATED WITH TYPE 2 DIABETES MELLITUS: ICD-10-CM

## 2022-06-14 DIAGNOSIS — L97.509 TYPE 2 DIABETES MELLITUS WITH FOOT ULCER, WITH LONG-TERM CURRENT USE OF INSULIN: ICD-10-CM

## 2022-06-14 DIAGNOSIS — E11.621 TYPE 2 DIABETES MELLITUS WITH FOOT ULCER, WITH LONG-TERM CURRENT USE OF INSULIN: ICD-10-CM

## 2022-06-14 DIAGNOSIS — Z79.4 TYPE 2 DIABETES MELLITUS WITH HYPERGLYCEMIA, WITH LONG-TERM CURRENT USE OF INSULIN: ICD-10-CM

## 2022-06-14 DIAGNOSIS — E11.65 TYPE 2 DIABETES MELLITUS WITH HYPERGLYCEMIA, WITH LONG-TERM CURRENT USE OF INSULIN: ICD-10-CM

## 2022-06-14 DIAGNOSIS — I15.2 HYPERTENSION ASSOCIATED WITH TYPE 2 DIABETES MELLITUS: ICD-10-CM

## 2022-06-14 DIAGNOSIS — E78.5 HYPERLIPIDEMIA ASSOCIATED WITH TYPE 2 DIABETES MELLITUS: ICD-10-CM

## 2022-06-14 DIAGNOSIS — R79.89 LOW TESTOSTERONE: ICD-10-CM

## 2022-06-14 DIAGNOSIS — Z79.4 TYPE 2 DIABETES MELLITUS WITH FOOT ULCER, WITH LONG-TERM CURRENT USE OF INSULIN: ICD-10-CM

## 2022-06-14 DIAGNOSIS — E11.69 HYPERLIPIDEMIA ASSOCIATED WITH TYPE 2 DIABETES MELLITUS: ICD-10-CM

## 2022-06-14 PROBLEM — U07.1 COVID-19: Status: RESOLVED | Noted: 2022-04-12 | Resolved: 2022-06-14

## 2022-06-14 PROBLEM — R10.84 GENERALIZED ABDOMINAL PAIN: Status: RESOLVED | Noted: 2022-03-16 | Resolved: 2022-06-14

## 2022-06-14 PROCEDURE — 99999 PR PBB SHADOW E&M-EST. PATIENT-LVL IV: ICD-10-PCS | Mod: PBBFAC,,, | Performed by: FAMILY MEDICINE

## 2022-06-14 PROCEDURE — 3075F PR MOST RECENT SYSTOLIC BLOOD PRESS GE 130-139MM HG: ICD-10-PCS | Mod: CPTII,S$GLB,, | Performed by: FAMILY MEDICINE

## 2022-06-14 PROCEDURE — 99214 PR OFFICE/OUTPT VISIT, EST, LEVL IV, 30-39 MIN: ICD-10-PCS | Mod: S$GLB,,, | Performed by: FAMILY MEDICINE

## 2022-06-14 PROCEDURE — 3044F PR MOST RECENT HEMOGLOBIN A1C LEVEL <7.0%: ICD-10-PCS | Mod: CPTII,S$GLB,, | Performed by: FAMILY MEDICINE

## 2022-06-14 PROCEDURE — 3079F DIAST BP 80-89 MM HG: CPT | Mod: CPTII,S$GLB,, | Performed by: FAMILY MEDICINE

## 2022-06-14 PROCEDURE — 3061F NEG MICROALBUMINURIA REV: CPT | Mod: CPTII,S$GLB,, | Performed by: FAMILY MEDICINE

## 2022-06-14 PROCEDURE — 3066F PR DOCUMENTATION OF TREATMENT FOR NEPHROPATHY: ICD-10-PCS | Mod: CPTII,S$GLB,, | Performed by: FAMILY MEDICINE

## 2022-06-14 PROCEDURE — 3075F SYST BP GE 130 - 139MM HG: CPT | Mod: CPTII,S$GLB,, | Performed by: FAMILY MEDICINE

## 2022-06-14 PROCEDURE — 3079F PR MOST RECENT DIASTOLIC BLOOD PRESSURE 80-89 MM HG: ICD-10-PCS | Mod: CPTII,S$GLB,, | Performed by: FAMILY MEDICINE

## 2022-06-14 PROCEDURE — 99999 PR PBB SHADOW E&M-EST. PATIENT-LVL IV: CPT | Mod: PBBFAC,,, | Performed by: FAMILY MEDICINE

## 2022-06-14 PROCEDURE — 3008F PR BODY MASS INDEX (BMI) DOCUMENTED: ICD-10-PCS | Mod: CPTII,S$GLB,, | Performed by: FAMILY MEDICINE

## 2022-06-14 PROCEDURE — 3066F NEPHROPATHY DOC TX: CPT | Mod: CPTII,S$GLB,, | Performed by: FAMILY MEDICINE

## 2022-06-14 PROCEDURE — 3061F PR NEG MICROALBUMINURIA RESULT DOCUMENTED/REVIEW: ICD-10-PCS | Mod: CPTII,S$GLB,, | Performed by: FAMILY MEDICINE

## 2022-06-14 PROCEDURE — 3044F HG A1C LEVEL LT 7.0%: CPT | Mod: CPTII,S$GLB,, | Performed by: FAMILY MEDICINE

## 2022-06-14 PROCEDURE — 1159F MED LIST DOCD IN RCRD: CPT | Mod: CPTII,S$GLB,, | Performed by: FAMILY MEDICINE

## 2022-06-14 PROCEDURE — 99214 OFFICE O/P EST MOD 30 MIN: CPT | Mod: S$GLB,,, | Performed by: FAMILY MEDICINE

## 2022-06-14 PROCEDURE — 3008F BODY MASS INDEX DOCD: CPT | Mod: CPTII,S$GLB,, | Performed by: FAMILY MEDICINE

## 2022-06-14 PROCEDURE — 1159F PR MEDICATION LIST DOCUMENTED IN MEDICAL RECORD: ICD-10-PCS | Mod: CPTII,S$GLB,, | Performed by: FAMILY MEDICINE

## 2022-06-14 RX ORDER — SEMAGLUTIDE 1.34 MG/ML
INJECTION, SOLUTION SUBCUTANEOUS
Qty: 2 PEN | Refills: 0 | Status: SHIPPED | OUTPATIENT
Start: 2022-06-14 | End: 2022-08-13

## 2022-06-14 RX ORDER — ZOSTER VACCINE RECOMBINANT, ADJUVANTED 50 MCG/0.5
KIT INTRAMUSCULAR
Qty: 1 EACH | Refills: 1 | Status: CANCELLED | OUTPATIENT
Start: 2022-06-14

## 2022-06-14 RX ORDER — INSULIN GLARGINE 100 [IU]/ML
25 INJECTION, SOLUTION SUBCUTANEOUS DAILY
Qty: 25 ML | Refills: 3 | Status: SHIPPED | OUTPATIENT
Start: 2022-06-14 | End: 2022-12-14

## 2022-06-14 NOTE — PROGRESS NOTES
FAMILY MEDICINE  [facility]    Reason for visit:   Chief Complaint   Patient presents with    Follow-up       HPI: Sj Gonzalez is a 66 y.o. male  - obesity, hypertension, hyperlipidemia, type 2 diabetes, lumbar degenerative disc disease, and peripheral arterial disease presents for diabetes and hypertension and labs follow-up     Cardiology: Dr. Jeovany Hart   Endocrinology Dr. Burgess  Podiatry Dr. Zepeda    He has recovered well from Covid-19 (4/2022)  He reports that he also would like to change his Trulicity to Ozempic since he has heard better weight loss outcomes with ozempic.     1. Diabetes Type 2     Endocrinology Dr. Burgess     Current treatment regimen:   insulin (BASAGLAR KWIKPEN U-100 INSULIN) glargine 100 units/mL (3mL) SubQ pen, Inject 25 Units into the skin once daily., Disp: 15 mL, Rfl: 6  pioglitazone (ACTOS) 30 MG tablet, Take 1 tablet (30 mg total) by mouth once daily., Disp: 90 tablet, Rfl: 1  Trulicity 1.5 mg weekly      Side effects from treatment: not well controlled     Prior medication:   Metformin (stomach cramps)  Glipizide (discontinue started Trulicity with Basaglar)  actos (discontinue started Trulicity with Basaglar)    Complications of diabetes: diabetic foot ulcer     Glucometer: FreeStyle Jaelyn   Glucose monitoring: premeal and bedtime    Hemoglobin A1C       Date                     Value               Ref Range           Status                06/03/2022               6.5 (H)             4.0 - 5.6 %         Final              Comment:    ADA Screening Guidelines:  5.7-6.4%  Consistent with prediabetes  >or=6.5%  Consistent with diabetes    High levels of fetal hemoglobin interfere with the HbA1C  assay. Heterozygous hemoglobin variants (HbS, HgC, etc)do  not significantly interfere with this assay.   However, presence of multiple variants may affect accuracy.         02/04/2022               6.8 (H)             4.0 - 5.6 %         Final               Comment:    ADA Screening Guidelines:  5.7-6.4%  Consistent with prediabetes  >or=6.5%  Consistent with diabetes    High levels of fetal hemoglobin interfere with the HbA1C  assay. Heterozygous hemoglobin variants (HbS, HgC, etc)do  not significantly interfere with this assay.   However, presence of multiple variants may affect accuracy.         11/01/2021               7.6 (H)             4.0 - 5.6 %         Final              Comment:    ADA Screening Guidelines:  5.7-6.4%  Consistent with prediabetes  >or=6.5%  Consistent with diabetes    High levels of fetal hemoglobin interfere with the HbA1C  assay. Heterozygous hemoglobin variants (HbS, HgC, etc)do  not significantly interfere with this assay.   However, presence of multiple variants may affect accuracy.    ----------          Low dose statin: Rosuvastatin 10 mg daily  Last eye exam: 2/15/21  Last foot exam: 11/1/21     Vaccines:   Influenza:  Due each flu season and pt declined  Pneumovax: 23 overdue and pt declines  Prevnar 13: NA due at 68 yo  Covid-19 recommended and he declines           Review of Systems   All other systems reviewed and are negative.      HISTORY:   Social History     Social History Narrative    He lives in [facility]. He lives with his wife and 2 children. His children are attending [employer]. He lived [place]. . Nonsmoker. Social drinker. 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 His son attended [employer].        ALLERGIES:   Review of patient's allergies indicates:   Allergen Reactions    Metformin      GI intolerance    Lisinopril        MEDS:     Current Outpatient Medications:     amLODIPine (NORVASC) 10 MG tablet, Take 1 tablet (10 mg total) by mouth once daily., Disp: 90 tablet, Rfl: 0    blood sugar diagnostic Strp, Test blood sugar 3 (three) times daily before meals., Disp: 100 strip, Rfl: 11    flash glucose scanning reader (LetsmakeSTSplashCast MAKI 14 DAY READER) Misc, use once daily, Disp: 1 each, Rfl: 0    flash  "glucose sensor (FREESTYLE MAKI 14 DAY SENSOR) Kit, use and change every 14 days, Disp: 2 kit, Rfl: 11    losartan-hydrochlorothiazide 100-25 mg (HYZAAR) 100-25 mg per tablet, Take 1 tablet by mouth once daily., Disp: 90 tablet, Rfl: 3    pen needle, diabetic 32 gauge x 5/32" Ndle, Use nightly as directed with insulin pen., Disp: 100 each, Rfl: 5    aspirin 81 MG Chew, Take 1 tablet (81 mg total) by mouth once daily., Disp: 30 tablet, Rfl: 1    insulin (BASAGLAR KWIKPEN U-100 INSULIN) glargine 100 units/mL (3mL) SubQ pen, Inject 25 Units into the skin once daily., Disp: 25 mL, Rfl: 3    meclizine (ANTIVERT) 25 mg tablet, Take 1 tablet (25 mg total) by mouth 3 (three) times daily as needed for Dizziness. (Patient not taking: Reported on 6/14/2022), Disp: 30 tablet, Rfl: 0    pioglitazone (ACTOS) 30 MG tablet, Take 1 tablet (30 mg total) by mouth once daily., Disp: 90 tablet, Rfl: 1    rosuvastatin (CRESTOR) 20 MG tablet, Take 1 tablet (20 mg total) by mouth every evening. (Patient not taking: Reported on 6/14/2022), Disp: 90 tablet, Rfl: 3    semaglutide (OZEMPIC) 0.25 mg or 0.5 mg(2 mg/1.5 mL) pen injector, Inject 0.25 mg into the skin every 7 days for 30 days, THEN 0.5 mg every 7 days., Disp: 2 pen, Rfl: 0    Vital signs:   Vitals:    06/14/22 0739   BP: 138/80   Pulse: 86   SpO2: 97%   Weight: 119.1 kg (262 lb 8 oz)   Height: 6' 1" (1.854 m)     Body mass index is 34.63 kg/m².    PHYSICAL EXAM:     Physical Exam  Constitutional:       General: He is not in acute distress.  Cardiovascular:      Rate and Rhythm: Normal rate and regular rhythm.      Pulses: Normal pulses.      Heart sounds: Normal heart sounds. No murmur heard.    No friction rub. No gallop.   Pulmonary:      Effort: Pulmonary effort is normal.      Breath sounds: Normal breath sounds. No wheezing, rhonchi or rales.   Musculoskeletal:      Cervical back: Neck supple.      Right lower leg: No edema.      Left lower leg: No edema.   Skin:     " General: Skin is warm.   Neurological:      Mental Status: He is alert.           PHQ4 = No data recorded    PERTINENT RESULTS:   No visits with results within 1 Week(s) from this visit.   Latest known visit with results is:   Lab Visit on 06/03/2022   Component Date Value Ref Range Status    Hemoglobin A1C 06/03/2022 6.5 (A) 4.0 - 5.6 % Final    Comment: ADA Screening Guidelines:  5.7-6.4%  Consistent with prediabetes  >or=6.5%  Consistent with diabetes    High levels of fetal hemoglobin interfere with the HbA1C  assay. Heterozygous hemoglobin variants (HbS, HgC, etc)do  not significantly interfere with this assay.   However, presence of multiple variants may affect accuracy.      Estimated Avg Glucose 06/03/2022 140 (A) 68 - 131 mg/dL Final    Testosterone, Total 06/03/2022 228 (A) 304 - 1227 ng/dL Final          Lab Results   Component Value Date    ALT 27 02/04/2022    AST 34 02/04/2022    ALKPHOS 49 02/04/2022    BILITOT 0.7 02/04/2022     Lab Results   Component Value Date    CHOL 142 02/04/2022    CHOL 134 11/03/2021    CHOL 135 06/20/2020     Lab Results   Component Value Date    HDL 39 (L) 02/04/2022    HDL 36 (L) 11/03/2021    HDL 44 06/20/2020     Lab Results   Component Value Date    LDLCALC 90.2 02/04/2022    LDLCALC 81.2 11/03/2021    LDLCALC 79.4 06/20/2020     Lab Results   Component Value Date    TRIG 64 02/04/2022    TRIG 84 11/03/2021    TRIG 58 06/20/2020     Lab Results   Component Value Date    CHOLHDL 27.5 02/04/2022    CHOLHDL 26.9 11/03/2021    CHOLHDL 32.6 06/20/2020     BMP  Lab Results   Component Value Date     02/04/2022    K 3.9 02/04/2022    CL 98 02/04/2022    CO2 29 02/04/2022    BUN 14 02/04/2022    CREATININE 1.01 03/22/2022    CALCIUM 8.9 02/04/2022    ANIONGAP 13 02/04/2022    ESTGFRAFRICA >60.0 03/22/2022    EGFRNONAA >60.0 03/22/2022       ASSESSMENT/PLAN:  Problem List Items Addressed This Visit        Cardiac/Vascular    Hyperlipidemia associated with type 2  diabetes mellitus    Overview     Lab Results   Component Value Date    LDLCALC 90.2 02/04/2022     - LDL goal <70 near goal           Relevant Medications    semaglutide (OZEMPIC) 0.25 mg or 0.5 mg(2 mg/1.5 mL) pen injector    insulin (BASAGLAR KWIKPEN U-100 INSULIN) glargine 100 units/mL (3mL) SubQ pen       Endocrine    Class 1 obesity due to excess calories with serious comorbidity and body mass index (BMI) of 33.0 to 33.9 in adult - Primary    Overview     - discussed recommendation for diet and cardiovascular exercise  - counseling on lifestyle modifications for risk factor reduction             Hypertension associated with type 2 diabetes mellitus    Overview     - well controlled  - continue current medications           Relevant Medications    semaglutide (OZEMPIC) 0.25 mg or 0.5 mg(2 mg/1.5 mL) pen injector    insulin (BASAGLAR KWIKPEN U-100 INSULIN) glargine 100 units/mL (3mL) SubQ pen    Other Relevant Orders    Comprehensive Metabolic Panel    Lipid Panel    Low testosterone    Overview     - he reports that he will go back to his Endocrinologist who managed his testosterone in the past           Type 2 diabetes mellitus with diabetic peripheral angiopathy and gangrene, without long-term current use of insulin    Overview     - followed by Endocrinology  Lab Results   Component Value Date    HGBA1C 6.5 (H) 06/03/2022     - well controlled  - okay to change Trulicity to Ozempic and start with 0.25 mg weekly and uptitrate as tolerated           Relevant Medications    semaglutide (OZEMPIC) 0.25 mg or 0.5 mg(2 mg/1.5 mL) pen injector    insulin (BASAGLAR KWIKPEN U-100 INSULIN) glargine 100 units/mL (3mL) SubQ pen    Other Relevant Orders    Comprehensive Metabolic Panel    Lipid Panel    Hemoglobin A1C    Type 2 diabetes mellitus with foot ulcer, with long-term current use of insulin    Overview     Lab Results   Component Value Date    HGBA1C 6.5 (H) 06/03/2022     - followed by Podiatry  - +PAD            Relevant Medications    semaglutide (OZEMPIC) 0.25 mg or 0.5 mg(2 mg/1.5 mL) pen injector    insulin (BASAGLAR KWIKPEN U-100 INSULIN) glargine 100 units/mL (3mL) SubQ pen    Other Relevant Orders    Comprehensive Metabolic Panel    Lipid Panel    Hemoglobin A1C      Other Visit Diagnoses     Type 2 diabetes mellitus with hyperglycemia, with long-term current use of insulin        Relevant Medications    semaglutide (OZEMPIC) 0.25 mg or 0.5 mg(2 mg/1.5 mL) pen injector    insulin (BASAGLAR KWIKPEN U-100 INSULIN) glargine 100 units/mL (3mL) SubQ pen          ORDERS:   Orders Placed This Encounter    Comprehensive Metabolic Panel    Lipid Panel    Hemoglobin A1C    semaglutide (OZEMPIC) 0.25 mg or 0.5 mg(2 mg/1.5 mL) pen injector    insulin (BASAGLAR KWIKPEN U-100 INSULIN) glargine 100 units/mL (3mL) SubQ pen       Vaccines recommended:  COVID 19 and he declined  -shingles vaccine available at pharmacy    Follow-up in 6 months with or sooner if any concerns.    This note is dictated using the M*Modal Fluency Direct word recognition program. There are word recognition mistakes that are occasionally missed on review.    Dr. Idalia Alexandra D.O.   Family Medicine

## 2022-06-14 NOTE — PATIENT INSTRUCTIONS
Okay to stop Trulicity per your request  Start Ozempic 0.25 mg weekly and we will increase once a month  Month 1: 0.25 mg weekly  Month 2: 0.5 mg weekly  Month 3: 1 mg weekly

## 2022-06-30 ENCOUNTER — PATIENT MESSAGE (OUTPATIENT)
Dept: FAMILY MEDICINE | Facility: CLINIC | Age: 67
End: 2022-06-30
Payer: COMMERCIAL

## 2022-09-07 ENCOUNTER — OFFICE VISIT (OUTPATIENT)
Dept: GASTROENTEROLOGY | Facility: CLINIC | Age: 67
End: 2022-09-07
Payer: MEDICARE

## 2022-09-07 VITALS
SYSTOLIC BLOOD PRESSURE: 153 MMHG | HEART RATE: 77 BPM | WEIGHT: 262 LBS | BODY MASS INDEX: 34.72 KG/M2 | DIASTOLIC BLOOD PRESSURE: 81 MMHG | HEIGHT: 73 IN

## 2022-09-07 DIAGNOSIS — Z79.4 TYPE 2 DIABETES MELLITUS WITH FOOT ULCER, WITH LONG-TERM CURRENT USE OF INSULIN: ICD-10-CM

## 2022-09-07 DIAGNOSIS — E11.59 HYPERTENSION ASSOCIATED WITH TYPE 2 DIABETES MELLITUS: ICD-10-CM

## 2022-09-07 DIAGNOSIS — E11.621 TYPE 2 DIABETES MELLITUS WITH FOOT ULCER, WITH LONG-TERM CURRENT USE OF INSULIN: ICD-10-CM

## 2022-09-07 DIAGNOSIS — R19.8 IRREGULAR BOWEL HABITS: ICD-10-CM

## 2022-09-07 DIAGNOSIS — R11.0 NAUSEA WITHOUT VOMITING: ICD-10-CM

## 2022-09-07 DIAGNOSIS — E11.52 TYPE 2 DIABETES MELLITUS WITH DIABETIC PERIPHERAL ANGIOPATHY AND GANGRENE, WITHOUT LONG-TERM CURRENT USE OF INSULIN: ICD-10-CM

## 2022-09-07 DIAGNOSIS — R10.13 EPIGASTRIC PAIN: Primary | ICD-10-CM

## 2022-09-07 DIAGNOSIS — L97.509 TYPE 2 DIABETES MELLITUS WITH FOOT ULCER, WITH LONG-TERM CURRENT USE OF INSULIN: ICD-10-CM

## 2022-09-07 DIAGNOSIS — I15.2 HYPERTENSION ASSOCIATED WITH TYPE 2 DIABETES MELLITUS: ICD-10-CM

## 2022-09-07 PROCEDURE — 99215 OFFICE O/P EST HI 40 MIN: CPT | Mod: PBBFAC,PO | Performed by: NURSE PRACTITIONER

## 2022-09-07 PROCEDURE — 99999 PR PBB SHADOW E&M-EST. PATIENT-LVL V: ICD-10-PCS | Mod: PBBFAC,,, | Performed by: NURSE PRACTITIONER

## 2022-09-07 PROCEDURE — 99214 OFFICE O/P EST MOD 30 MIN: CPT | Mod: S$PBB,,, | Performed by: NURSE PRACTITIONER

## 2022-09-07 PROCEDURE — 99999 PR PBB SHADOW E&M-EST. PATIENT-LVL V: CPT | Mod: PBBFAC,,, | Performed by: NURSE PRACTITIONER

## 2022-09-07 PROCEDURE — 99214 PR OFFICE/OUTPT VISIT, EST, LEVL IV, 30-39 MIN: ICD-10-PCS | Mod: S$PBB,,, | Performed by: NURSE PRACTITIONER

## 2022-09-07 RX ORDER — OMEPRAZOLE 40 MG/1
40 CAPSULE, DELAYED RELEASE ORAL DAILY
Qty: 30 CAPSULE | Refills: 2 | Status: SHIPPED | OUTPATIENT
Start: 2022-09-07 | End: 2023-01-30 | Stop reason: SDUPTHER

## 2022-09-07 RX ORDER — LOSARTAN POTASSIUM AND HYDROCHLOROTHIAZIDE 25; 100 MG/1; MG/1
1 TABLET ORAL DAILY
Qty: 90 TABLET | Refills: 1 | Status: SHIPPED | OUTPATIENT
Start: 2022-09-07 | End: 2023-11-27 | Stop reason: SDUPTHER

## 2022-09-07 RX ORDER — PIOGLITAZONEHYDROCHLORIDE 30 MG/1
30 TABLET ORAL DAILY
Qty: 90 TABLET | Refills: 1 | Status: SHIPPED | OUTPATIENT
Start: 2022-09-07 | End: 2022-12-14

## 2022-09-07 NOTE — PATIENT INSTRUCTIONS
EGD Prep Instructions    [facility] [address] [place]  [address]    You are scheduled for an EGD with Dr. Hodges on 9/16/2022 at [facility].  You will enter through the [facility] entrance and check in at [facility].    Nothing to eat or drink after midnight before the procedure.  You MAY brush your teeth.    You MAY take your blood pressure, heart, and seizure medication on the morning of the procedure, with a SIP of water.  Hold ALL other medications until after the procedure.    You must have someone with you to DRIVE YOU HOME since you will be receiving IV sedation for the procedure.    If you are on blood thinners THAT YOU HAVE BEEN INSTRUCTED TO HOLD BY YOUR DOCTOR FOR THIS PROCEDURE, then do NOT take this the morning of your EGD.  Do NOT stop these medications on your own, they must be approved to be held by your doctor.  Your EGD can NOT be done if you are on these medications.  Examples of blood thinners include: Coumadin, Aggrenox, Plavix, Pradaxa, Reapro, Pletal, Xarelto, Ticagrelor, Brilinta, Eliquis, and high dose aspirin (325 mg).  You do not have to stop baby aspirin 81 mg.    You will receive a call a few days before your EGD to tell you the time to arrive.  If you have not received a call by the day before your procedure, call the Pre-op Coordinator at [phone].

## 2022-09-07 NOTE — PROGRESS NOTES
Subjective:       Patient ID: Sj Gonzalez is a 67 y.o. male.    Chief Complaint: Abdominal Pain, Diarrhea, Rectal Bleeding, and Constipation    67 y/o male hypertension, type 2 diabetes, hx of right inguinal hernia repair (9/2021) presents to clinic with c/o abdominal pain and nausea. Last seen 6 months ago with c/o abdominal pain and early satiety but declined to schedule recommended EGD and GES. States symptoms have worsened since starting Ozempic 2 months ago. Has constant epigastric pain described as fullness associated with nausea. He attests to worsened GI symptoms after overeating. Has irregular bowel pattern with occasional diarrhea. Has noticed some BRBPR after diarrhea. Hx of internal hemorrhoids seen on colonoscopy in 2020. He has not taken any medication for symptoms.     Abdominal Pain  This is a recurrent problem. The current episode started more than 1 month ago. The problem occurs intermittently. The problem has been gradually worsening. The pain is located in the epigastric region. The quality of the pain is aching and a sensation of fullness. The abdominal pain does not radiate. Associated symptoms include belching, constipation, diarrhea, flatus, hematochezia and nausea. Pertinent negatives include no melena or vomiting. The pain is aggravated by eating. The pain is relieved by Nothing. He has tried nothing for the symptoms.     Past Medical History:   Diagnosis Date    COVID-19 4/12/2022    - COVID-19 positive - counseling on management COVID-19 - COVID risk score equals 5 - Recommend Paxlovid    Diabetes mellitus     Generalized abdominal pain 3/16/2022    - unknown etiology - reviewed x-ray, abdominal ultrasound and colonoscopy - recommend CT scan abdomen pelvis - recommend re-evaluation by gastroenterology    Heart murmur     MVP    Hypertension     Non-recurrent unilateral inguinal hernia without obstruction or gangrene 9/30/2021    Urinary tract infection        Past Surgical History:    Procedure Laterality Date    AORTOGRAPHY WITH SERIALOGRAPHY N/A 11/4/2021    Procedure: AORTOGRAM, WITH SERIALOGRAPHY;  Surgeon: Jeovany Hart III, MD;  [facility];  Service: Cardiology;  Laterality: N/A;    ARTHROSCOPY OF KNEE Right     COLONOSCOPY N/A 9/25/2020    Procedure: COLONOSCOPY;  Surgeon: Vicky Hodges MD;  [facility];  Service: Endoscopy;  Laterality: N/A;    HERNIA REPAIR      ROBOT-ASSISTED LAPAROSCOPIC REPAIR OF INGUINAL HERNIA Right 9/30/2021    Procedure: ROBOTIC REPAIR, HERNIA, INGUINAL;  Surgeon: Carmelo Farooq MD;  Location: [facility] OR;  Service: General;  Laterality: Right;    SPINE SURGERY      Lumbar       Family History   Problem Relation Age of Onset    No Known Problems Mother     No Known Problems Father     Diabetes Sister     Diabetes Brother     No Known Problems Daughter     Cancer Neg Hx     Heart disease Neg Hx     Prostate cancer Neg Hx     Kidney disease Neg Hx        Social History     Socioeconomic History    Marital status:    Tobacco Use    Smoking status: Never    Smokeless tobacco: Never   Substance and Sexual Activity    Alcohol use: Yes     Alcohol/week: 0.0 standard drinks     Comment: daily    Drug use: No    Sexual activity: Yes     Partners: Female   Social History Narrative    He lives in [facility]. He lives with his wife and 2 children. His children are attending [employer]. He lived [place]. . Nonsmoker. Social drinker. 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 His son attended [employer].      Social Determinants of Health     Financial Resource Strain: Low Risk     Difficulty of Paying Living Expenses: Not hard at all   Food Insecurity: No Food Insecurity    Worried About Running Out of Food in the Last Year: Never true    Ran Out of Food in the Last Year: Never true   Transportation Needs: No Transportation Needs    Lack of Transportation (Medical): No    Lack of Transportation (Non-Medical): No   Physical  "Activity: Sufficiently Active    Days of Exercise per Week: 5 days    Minutes of Exercise per Session: 30 min   Stress: No Stress Concern Present    Feeling of Stress : Not at all   Social Connections: Unknown    Frequency of Communication with Friends and Family: More than three times a week    Frequency of Social Gatherings with Friends and Family: More than three times a week    Active Member of Clubs or Organizations: Yes    Attends Club or Organization Meetings: More than 4 times per year    Marital Status:    Housing Stability: Unknown    Unable to Pay for Housing in the Last Year: No    Unstable Housing in the Last Year: No       Review of Systems   Constitutional:  Negative for appetite change and unexpected weight change.   HENT:  Negative for trouble swallowing.    Respiratory:  Negative for shortness of breath.    Cardiovascular:  Negative for chest pain.   Gastrointestinal:  Positive for abdominal pain, constipation, diarrhea, flatus, hematochezia and nausea. Negative for melena and vomiting.   Neurological:  Negative for dizziness and weakness.   Hematological:  Negative for adenopathy. Does not bruise/bleed easily.   Psychiatric/Behavioral:  Negative for dysphoric mood.        Objective:     Vitals:    09/07/22 1422   BP: (!) 153/81   BP Location: Left arm   Patient Position: Sitting   BP Method: Large (Automatic)   Pulse: 77   Weight: 118.8 kg (262 lb)   Height: 6' 1" (1.854 m)          Physical Exam  Constitutional:       General: He is not in acute distress.     Appearance: Normal appearance. He is not ill-appearing.   HENT:      Head: Normocephalic.   Eyes:      Conjunctiva/sclera: Conjunctivae normal.      Pupils: Pupils are equal, round, and reactive to light.   Pulmonary:      Effort: Pulmonary effort is normal. No respiratory distress.   Abdominal:      General: Abdomen is protuberant. Bowel sounds are normal. There is no distension.      Palpations: Abdomen is soft.      Tenderness: " There is abdominal tenderness in the epigastric area.   Musculoskeletal:         General: No swelling. Normal range of motion.      Cervical back: Normal range of motion.   Skin:     General: Skin is warm and dry.   Neurological:      Mental Status: He is alert and oriented to person, place, and time.   Psychiatric:         Mood and Affect: Mood normal.         Behavior: Behavior normal.             Assessment:         ICD-10-CM ICD-9-CM   1. Epigastric pain  R10.13 789.06   2. Nausea without vomiting  R11.0 787.02   3. Irregular bowel habits  R19.8 569.89   4. Type 2 diabetes mellitus with foot ulcer, with long-term current use of insulin  E11.621 250.80    L97.509 707.15    Z79.4 V58.67   5. Type 2 diabetes mellitus with diabetic peripheral angiopathy and gangrene, without long-term current use of insulin  E11.52 250.70     443.81     785.4   6. Hypertension associated with type 2 diabetes mellitus  E11.59 250.80    I15.2 401.9       Plan:       Epigastric pain  -     Case Request Endoscopy: EGD (ESOPHAGOGASTRODUODENOSCOPY)  -     omeprazole (PRILOSEC) 40 MG capsule; Take 1 capsule (40 mg total) by mouth once daily.  Dispense: 30 capsule; Refill: 2    Nausea without vomiting  -     Case Request Endoscopy: EGD (ESOPHAGOGASTRODUODENOSCOPY)  -     omeprazole (PRILOSEC) 40 MG capsule; Take 1 capsule (40 mg total) by mouth once daily.  Dispense: 30 capsule; Refill: 2    Irregular bowel habits    Type 2 diabetes mellitus with foot ulcer, with long-term current use of insulin  -     pioglitazone (ACTOS) 30 MG tablet; Take 1 tablet (30 mg total) by mouth once daily.  Dispense: 90 tablet; Refill: 1    Type 2 diabetes mellitus with diabetic peripheral angiopathy and gangrene, without long-term current use of insulin  -     pioglitazone (ACTOS) 30 MG tablet; Take 1 tablet (30 mg total) by mouth once daily.  Dispense: 90 tablet; Refill: 1    Hypertension associated with type 2 diabetes mellitus  -      "losartan-hydrochlorothiazide 100-25 mg (HYZAAR) 100-25 mg per tablet; Take 1 tablet by mouth once daily.  Dispense: 90 tablet; Refill: 1    - EGD schedule.  Start omeprazole 40 mg daily and fiber supplement daily. Ozempic counseling including mechanism of action and side effects. Symptoms may be due to delayed gastric emptying. Recommended smaller frequent meals.     Follow up if symptoms worsen or fail to improve.     Patient's Medications   New Prescriptions    OMEPRAZOLE (PRILOSEC) 40 MG CAPSULE    Take 1 capsule (40 mg total) by mouth once daily.   Previous Medications    AMLODIPINE (NORVASC) 10 MG TABLET    Take 1 tablet (10 mg total) by mouth once daily.    ASPIRIN 81 MG CHEW    Take 1 tablet (81 mg total) by mouth once daily.    BLOOD SUGAR DIAGNOSTIC STRP    Test blood sugar 3 (three) times daily before meals.    FLASH GLUCOSE SCANNING READER (FREESTYLE MAKI 14 DAY READER) MISC    use once daily    FLASH GLUCOSE SENSOR (FREESTYLE MAKI 14 DAY SENSOR) KIT    use and change every 14 days    INSULIN (BASAGLAR KWIKPEN U-100 INSULIN) GLARGINE 100 UNITS/ML (3ML) SUBQ PEN    Inject 25 Units into the skin once daily.    MECLIZINE (ANTIVERT) 25 MG TABLET    Take 1 tablet (25 mg total) by mouth 3 (three) times daily as needed for Dizziness.    PEN NEEDLE, DIABETIC 32 GAUGE X 5/32" NDLE    Use nightly as directed with insulin pen.    ROSUVASTATIN (CRESTOR) 20 MG TABLET    Take 1 tablet (20 mg total) by mouth every evening.   Modified Medications    Modified Medication Previous Medication    LOSARTAN-HYDROCHLOROTHIAZIDE 100-25 MG (HYZAAR) 100-25 MG PER TABLET losartan-hydrochlorothiazide 100-25 mg (HYZAAR) 100-25 mg per tablet       Take 1 tablet by mouth once daily.    Take 1 tablet by mouth once daily.    PIOGLITAZONE (ACTOS) 30 MG TABLET pioglitazone (ACTOS) 30 MG tablet       Take 1 tablet (30 mg total) by mouth once daily.    Take 1 tablet (30 mg total) by mouth once daily.   Discontinued Medications    No " medications on file

## 2022-09-08 ENCOUNTER — TELEPHONE (OUTPATIENT)
Dept: GASTROENTEROLOGY | Facility: CLINIC | Age: 67
End: 2022-09-08
Payer: MEDICARE

## 2022-09-08 NOTE — TELEPHONE ENCOUNTER
Attempted to contact patient to find out Covid 19 Vaccination status, left message for patient to call the office.

## 2022-09-28 ENCOUNTER — PATIENT MESSAGE (OUTPATIENT)
Dept: GASTROENTEROLOGY | Facility: CLINIC | Age: 67
End: 2022-09-28
Payer: MEDICARE

## 2022-12-13 ENCOUNTER — TELEPHONE (OUTPATIENT)
Dept: FAMILY MEDICINE | Facility: CLINIC | Age: 67
End: 2022-12-13
Payer: MEDICARE

## 2022-12-13 PROBLEM — E11.65 TYPE 2 DIABETES MELLITUS WITH HYPERGLYCEMIA, WITH LONG-TERM CURRENT USE OF INSULIN: Status: ACTIVE | Noted: 2022-12-13

## 2022-12-13 PROBLEM — Z79.4 TYPE 2 DIABETES MELLITUS WITH HYPERGLYCEMIA, WITH LONG-TERM CURRENT USE OF INSULIN: Status: ACTIVE | Noted: 2022-12-13

## 2022-12-13 NOTE — TELEPHONE ENCOUNTER
Spoke with patient.  Appointment changed to virtual tomorrow.  Patient states Actos med will cost $500. Can you prescribe something else.

## 2022-12-13 NOTE — TELEPHONE ENCOUNTER
Please check with his pharmacy Actos (pioglitazone) is a very inexpensive medication is currently on [facility] $4 list for 30 days and $9 for 90 days.     I can also discuss at visit tomorrow    Dr. Idalia Alexandra D.O.   [facility]

## 2022-12-14 ENCOUNTER — OFFICE VISIT (OUTPATIENT)
Dept: FAMILY MEDICINE | Facility: CLINIC | Age: 67
End: 2022-12-14
Payer: MEDICARE

## 2022-12-14 DIAGNOSIS — L81.9 DISCOLORATION OF SKIN OF TOE: ICD-10-CM

## 2022-12-14 DIAGNOSIS — E78.5 HYPERLIPIDEMIA ASSOCIATED WITH TYPE 2 DIABETES MELLITUS: ICD-10-CM

## 2022-12-14 DIAGNOSIS — E11.65 TYPE 2 DIABETES MELLITUS WITH HYPERGLYCEMIA, WITH LONG-TERM CURRENT USE OF INSULIN: Primary | ICD-10-CM

## 2022-12-14 DIAGNOSIS — Z79.4 TYPE 2 DIABETES MELLITUS WITH HYPERGLYCEMIA, WITH LONG-TERM CURRENT USE OF INSULIN: Primary | ICD-10-CM

## 2022-12-14 DIAGNOSIS — I70.263 ATHEROSCLEROSIS OF NATIVE ARTERY OF BOTH LOWER EXTREMITIES WITH GANGRENE: ICD-10-CM

## 2022-12-14 DIAGNOSIS — E11.52 TYPE 2 DIABETES MELLITUS WITH DIABETIC PERIPHERAL ANGIOPATHY AND GANGRENE, WITHOUT LONG-TERM CURRENT USE OF INSULIN: ICD-10-CM

## 2022-12-14 DIAGNOSIS — L97.509 TYPE 2 DIABETES MELLITUS WITH FOOT ULCER, WITH LONG-TERM CURRENT USE OF INSULIN: ICD-10-CM

## 2022-12-14 DIAGNOSIS — K30 DELAYED GASTRIC EMPTYING: ICD-10-CM

## 2022-12-14 DIAGNOSIS — E11.621 TYPE 2 DIABETES MELLITUS WITH FOOT ULCER, WITH LONG-TERM CURRENT USE OF INSULIN: ICD-10-CM

## 2022-12-14 DIAGNOSIS — E11.69 HYPERLIPIDEMIA ASSOCIATED WITH TYPE 2 DIABETES MELLITUS: ICD-10-CM

## 2022-12-14 DIAGNOSIS — E66.09 CLASS 1 OBESITY DUE TO EXCESS CALORIES WITH SERIOUS COMORBIDITY AND BODY MASS INDEX (BMI) OF 33.0 TO 33.9 IN ADULT: ICD-10-CM

## 2022-12-14 DIAGNOSIS — Z79.4 TYPE 2 DIABETES MELLITUS WITH FOOT ULCER, WITH LONG-TERM CURRENT USE OF INSULIN: ICD-10-CM

## 2022-12-14 PROBLEM — L08.9 DIABETIC FOOT INFECTION: Status: RESOLVED | Noted: 2021-11-01 | Resolved: 2022-12-14

## 2022-12-14 PROBLEM — R10.13 EPIGASTRIC PAIN: Status: RESOLVED | Noted: 2022-09-07 | Resolved: 2022-12-14

## 2022-12-14 PROBLEM — R11.0 NAUSEA WITHOUT VOMITING: Status: RESOLVED | Noted: 2022-09-07 | Resolved: 2022-12-14

## 2022-12-14 PROBLEM — E11.628 DIABETIC FOOT INFECTION: Status: RESOLVED | Noted: 2021-11-01 | Resolved: 2022-12-14

## 2022-12-14 PROCEDURE — 99214 PR OFFICE/OUTPT VISIT, EST, LEVL IV, 30-39 MIN: ICD-10-PCS | Mod: 95,,, | Performed by: FAMILY MEDICINE

## 2022-12-14 PROCEDURE — 99214 OFFICE O/P EST MOD 30 MIN: CPT | Mod: 95,,, | Performed by: FAMILY MEDICINE

## 2022-12-14 RX ORDER — INSULIN GLARGINE 100 [IU]/ML
30 INJECTION, SOLUTION SUBCUTANEOUS DAILY
Qty: 27 ML | Refills: 3 | Status: SHIPPED | OUTPATIENT
Start: 2022-12-14 | End: 2023-01-18 | Stop reason: SDUPTHER

## 2022-12-14 RX ORDER — ROSUVASTATIN CALCIUM 20 MG/1
20 TABLET, COATED ORAL DAILY
Qty: 90 TABLET | Refills: 3 | Status: SHIPPED | OUTPATIENT
Start: 2022-12-14 | End: 2024-02-21

## 2022-12-14 RX ORDER — SEMAGLUTIDE 1.34 MG/ML
1 INJECTION, SOLUTION SUBCUTANEOUS
Qty: 3 PEN | Refills: 1 | Status: SHIPPED | OUTPATIENT
Start: 2022-12-14 | End: 2023-01-18 | Stop reason: SDUPTHER

## 2022-12-14 RX ORDER — FLASH GLUCOSE SENSOR
2 KIT MISCELLANEOUS
Qty: 2 KIT | Refills: 11 | Status: SHIPPED | OUTPATIENT
Start: 2022-12-14 | End: 2023-03-29 | Stop reason: SDUPTHER

## 2022-12-14 NOTE — PATIENT INSTRUCTIONS
Increase Ozempic to 0.5 mg weekly for 2 weeks then to 1 mg weekly  Increase Basaglar 30 units  3.   Stop Pioglitazone     Thank you for your interest in the [facility] Early Detection test. We are very excited about this clinical trial, the future of early cancer detection, and the overwhelming interest we have received. We have not yet started enrollment for this trial, but are ramping up to do so within the coming month.     Please email [email or web address] for more information and to be added to the list of interested patients.     [employer] is offering this same test for a fee. Please email [email or web address] if you are interested in purchasing.     Thank you for your interest as [facility] strives to improve the health outcomes of our community.     See other encounter

## 2022-12-14 NOTE — PROGRESS NOTES
VIRTUAL/TELEMEDICINE VISIT   FAMILY MEDICINE   [facility]    The patient location is: [place]  The chief complaint leading to consultation is: followed diabetes and labs  Visit type: Virtual visit with synchronous audio and video   Total time spent: 30 minute  Each patient to whom he or she provides medical services by telemedicine is:  (1) informed of the relationship between the physician and patient and the respective role of any other health care provider with respect to management of the patient; and (2) notified that he or she may decline to receive medical services by telemedicine and may withdraw from such care at any time.    Reason for visit:   Chief Complaint   Patient presents with    Diabetes       SUBJECTIVE: Sj Gonzalez is a 67 y.o. male  - obesity, hypertension, hyperlipidemia, type 2 diabetes, lumbar degenerative disc disease, and peripheral arterial disease presents for diabetes follow-up     Cardiology: Dr. Jeovany Hart   Endocrinology Dr. Guzman  Podiatry Dr. Zepeda  Gastroenterology: Dr. Hodges    Since last visit he did see Gastroenterology for his epigastric pain and nausea.  He was evaluated and I reviewed GI note and evaluation.  On 9/16/22 he had an endoscopy was essentially normal but did show delayed gastric emptying with Gastroenterology suspected was related with his GLP-1.  He was instructed to continue his omeprazole.  Today he reports that overall his symptoms have improved.    He also reports that since his last visit he reestablish with Endocrinology Dr. Guzman who is assisting on getting him a less expensive freestyle Jaelyn.  He reports that he has not had his freestyle Jaelyn for a few weeks and has not been able to monitor his glucose levels.    Since his last visit he did start Ozempic 0.25 mg weekly.  He was instructed to titrate as tolerated however he reports that he forgot and never up titrated his Ozempic.  He is currently taking Ozempic 0.25 mg weekly.  He did  run out of his pioglitazone several weeks ago and reports that the price of his pioglitazone went up from 27 dollars for 90 day supply to 400 dollars.  He reports that is no longer affordable    He also has concerns for black spot on his right great toe.  Reports that been there for a few weeks and it seems to move.  He has had a history of a diabetic foot infection and known peripheral arterial disease.  He reports that it seems to improve at times but then worsens.  He is unable to show me an image at this time but     1. Diabetes Type 2     Endocrinology Dr. Guzman     Current treatment regimen:   insulin (BASAGLAR KWIKPEN U-100 INSULIN) glargine 100 units/mL (3mL) SubQ pen, Inject 25 Units into the skin once daily.   pioglitazone (ACTOS) 30 MG tablet, Take 1 tablet (30 mg total) by mouth once daily., Disp: 90 tablet, Rfl: 1  - not taking due to cost  semaglutide (OZEMPIC) 0.25 mg or 0.5 mg(2 mg/1.5 mL) pen injector, Inject 0.25 mg into the skin every 7 days.,    Side effects from treatment:  Nausea reports that has improved     Prior medication:   Metformin (stomach cramps)  Trulicity (too expensive)   Glipizide (discontinue started Trulicity with Basaglar)    Complications of diabetes: diabetic foot ulcer     Glucometer: FreeStyle Jaelyn   Glucose monitoring: premeal and bedtime  A. Fasting:  He is not been able to monitors since he is not had his freestyle Jaelyn sensor    Hemoglobin A1C       Date                     Value               Ref Range           Status                12/12/2022               7.6 (H)             4.0 - 5.6 %         Final              Comment:    ADA Screening Guidelines:  5.7-6.4%  Consistent with prediabetes  >or=6.5%  Consistent with diabetes    High levels of fetal hemoglobin interfere with the HbA1C  assay. Heterozygous hemoglobin variants (HbS, HgC, etc)do  not significantly interfere with this assay.   However, presence of multiple variants may affect accuracy.          06/03/2022               6.5 (H)             4.0 - 5.6 %         Final              Comment:    ADA Screening Guidelines:  5.7-6.4%  Consistent with prediabetes  >or=6.5%  Consistent with diabetes    High levels of fetal hemoglobin interfere with the HbA1C  assay. Heterozygous hemoglobin variants (HbS, HgC, etc)do  not significantly interfere with this assay.   However, presence of multiple variants may affect accuracy.         02/04/2022               6.8 (H)             4.0 - 5.6 %         Final              Comment:    ADA Screening Guidelines:  5.7-6.4%  Consistent with prediabetes  >or=6.5%  Consistent with diabetes    High levels of fetal hemoglobin interfere with the HbA1C  assay. Heterozygous hemoglobin variants (HbS, HgC, etc)do  not significantly interfere with this assay.   However, presence of multiple variants may affect accuracy.    ----------       Low dose statin: Rosuvastatin   Last eye exam:6/8/22  Last foot exam: 1/2022     Vaccines:   Influenza:  Due each flu season and pt declined  Pneumovax: 23 overdue and pt declines  Prevnar 20: recommended  Covid-19 recommended and he declines       Review of Systems   All other systems reviewed and are negative.      HISTORY:   Past Medical History:   Diagnosis Date    COVID-19 4/12/2022    - COVID-19 positive - counseling on management COVID-19 - COVID risk score equals 5 - Recommend Paxlovid    Diabetes mellitus     Epigastric pain 9/7/2022    Generalized abdominal pain 3/16/2022    - unknown etiology - reviewed x-ray, abdominal ultrasound and colonoscopy - recommend CT scan abdomen pelvis - recommend re-evaluation by gastroenterology    Heart murmur     MVP    Hypertension     Non-recurrent unilateral inguinal hernia without obstruction or gangrene 9/30/2021    Urinary tract infection        Past Surgical History:   Procedure Laterality Date    AORTOGRAPHY WITH SERIALOGRAPHY N/A 11/4/2021    Procedure: AORTOGRAM, WITH SERIALOGRAPHY;  Surgeon: Jeovany HUGHES  Tanner PANDEY MD;  Location: [facility] CATH LAB;  Service: Cardiology;  Laterality: N/A;    ARTHROSCOPY OF KNEE Right     COLONOSCOPY N/A 9/25/2020    Procedure: COLONOSCOPY;  Surgeon: Vicky Hodges MD;  Location: [facility] ENDO;  Service: Endoscopy;  Laterality: N/A;    ESOPHAGOGASTRODUODENOSCOPY N/A 9/16/2022    Procedure: EGD (ESOPHAGOGASTRODUODENOSCOPY);  Surgeon: Vicky Hodges MD;  Location: [facility] ENDO;  Service: Endoscopy;  Laterality: N/A;    HERNIA REPAIR      ROBOT-ASSISTED LAPAROSCOPIC REPAIR OF INGUINAL HERNIA Right 9/30/2021    Procedure: ROBOTIC REPAIR, HERNIA, INGUINAL;  Surgeon: Carmelo Farooq MD;  Location: [facility] OR;  Service: General;  Laterality: Right;    SPINE SURGERY      Lumbar       Family History   Problem Relation Age of Onset    No Known Problems Mother     No Known Problems Father     Diabetes Sister     Diabetes Brother     No Known Problems Daughter     Cancer Neg Hx     Heart disease Neg Hx     Prostate cancer Neg Hx     Kidney disease Neg Hx        Social History     Tobacco Use    Smoking status: Never    Smokeless tobacco: Never   Substance Use Topics    Alcohol use: Yes     Alcohol/week: 0.0 standard drinks     Comment: daily    Drug use: No       Social History     Social History Narrative    He lives in [facility]. He lives with his wife and 2 children. His children are attending [employer]. He lived [place]. . Nonsmoker. Social drinker. He has a [employer] business. His son attended [employer].        ALLERGIES:   Review of patient's allergies indicates:   Allergen Reactions    Metformin      GI intolerance    Lisinopril        MEDS:     Current Outpatient Medications:     amLODIPine (NORVASC) 10 MG tablet, Take 1 tablet (10 mg total) by mouth once daily., Disp: 90 tablet, Rfl: 0    aspirin 81 MG Chew, Take 1 tablet (81 mg total) by mouth once daily., Disp: 30 tablet, Rfl: 1    blood sugar diagnostic Strp, Test blood sugar 3 (three) times daily before  "meals., Disp: 100 strip, Rfl: 11    flash glucose scanning reader (FREESTYLE MAKI 14 DAY READER) Misc, use once daily, Disp: 1 each, Rfl: 0    flash glucose sensor (FREESTYLE MAKI 14 DAY SENSOR) Kit, use and change every 14 days, Disp: 2 kit, Rfl: 11    losartan-hydrochlorothiazide 100-25 mg (HYZAAR) 100-25 mg per tablet, Take 1 tablet by mouth once daily., Disp: 90 tablet, Rfl: 1    omeprazole (PRILOSEC) 40 MG capsule, Take 1 capsule (40 mg total) by mouth once daily., Disp: 30 capsule, Rfl: 2    pen needle, diabetic 32 gauge x 5/32" Ndle, Use nightly as directed with insulin pen., Disp: 100 each, Rfl: 5    rosuvastatin (CRESTOR) 20 MG tablet, Take 1 tablet (20 mg total) by mouth once daily., Disp: 90 tablet, Rfl: 3    insulin (BASAGLAR KWIKPEN U-100 INSULIN) glargine 100 units/mL SubQ pen, Inject 30 Units into the skin once daily., Disp: 27 mL, Rfl: 3    semaglutide (OZEMPIC) 1 mg/dose (4 mg/3 mL), Inject 1 mg into the skin every 7 days., Disp: 3 pen, Rfl: 1    Vital signs:   There were no vitals filed for this visit.  There is no height or weight on file to calculate BMI.    PHYSICAL EXAM:     Physical Exam  Constitutional:       General: He is not in acute distress.  Pulmonary:      Effort: Pulmonary effort is normal. No respiratory distress.   Neurological:      Mental Status: He is alert.   Psychiatric:         Speech: Speech normal.         PHQ4 = No data recorded    PERTINENT RESULTS:   Lab Visit on 12/12/2022   Component Date Value Ref Range Status    Sodium 12/12/2022 139  136 - 145 mmol/L Final    Potassium 12/12/2022 4.4  3.5 - 5.1 mmol/L Final    Chloride 12/12/2022 104  95 - 110 mmol/L Final    CO2 12/12/2022 29  23 - 29 mmol/L Final    Glucose 12/12/2022 193 (H)  70 - 110 mg/dL Final    BUN 12/12/2022 19  2 - 20 mg/dL Final    Creatinine 12/12/2022 0.90  0.50 - 1.40 mg/dL Final    Calcium 12/12/2022 8.3 (L)  8.7 - 10.5 mg/dL Final    Total Protein 12/12/2022 7.2  6.0 - 8.4 g/dL Final    Albumin " 12/12/2022 4.0  3.5 - 5.2 g/dL Final    Total Bilirubin 12/12/2022 0.6  0.1 - 1.0 mg/dL Final    Comment: For infants and newborns, interpretation of results should be based  on gestational age, weight and in agreement with clinical  observations.    Premature Infant recommended reference ranges:  Up to 24 hours.............<8.0 mg/dL  Up to 48 hours............<12.0 mg/dL  3-5 days..................<15.0 mg/dL  6-29 days.................<15.0 mg/dL      Alkaline Phosphatase 12/12/2022 76  38 - 126 U/L Final    AST 12/12/2022 46  15 - 46 U/L Final    ALT 12/12/2022 40  10 - 44 U/L Final    Anion Gap 12/12/2022 6 (L)  8 - 16 mmol/L Final    eGFR 12/12/2022 >60.0  >60 mL/min/1.73 m^2 Final    Cholesterol 12/12/2022 141  120 - 199 mg/dL Final    Comment: The National Cholesterol Education Program (NCEP) has set the  following guidelines (reference ranges) for Cholesterol:  Optimal.....................<200 mg/dL  Borderline High.............200-239 mg/dL  High........................> or = 240 mg/dL      Triglycerides 12/12/2022 51  30 - 150 mg/dL Final    Comment: The National Cholesterol Education Program (NCEP) has set the  following guidelines (reference values) for triglycerides:  Normal......................<150 mg/dL  Borderline High.............150-199 mg/dL  High........................200-499 mg/dL      HDL 12/12/2022 47  40 - 75 mg/dL Final    Comment: The National Cholesterol Education Program (NCEP) has set the  following guidelines (reference values) for HDL Cholesterol:  Low...............<40 mg/dL  Optimal...........>60 mg/dL      LDL Cholesterol 12/12/2022 83.8  63.0 - 159.0 mg/dL Final    Comment: The National Cholesterol Education Program (NCEP) has set the  following guidelines (reference values) for LDL Cholesterol:  Optimal.......................<130 mg/dL  Borderline High...............130-159 mg/dL  High..........................160-189 mg/dL  Very High.....................>190 mg/dL       HDL/Cholesterol Ratio 12/12/2022 33.3  20.0 - 50.0 % Final    Total Cholesterol/HDL Ratio 12/12/2022 3.0  2.0 - 5.0 Final    Non-HDL Cholesterol 12/12/2022 94  mg/dL Final    Comment: Risk category and Non-HDL cholesterol goals:  Coronary heart disease (CHD)or equivalent (10-year risk of CHD >20%):  Non-HDL cholesterol goal     <130 mg/dL  Two or more CHD risk factors and 10-year risk of CHD <= 20%:  Non-HDL cholesterol goal     <160 mg/dL  0 to 1 CHD risk factor:  Non-HDL cholesterol goal     <190 mg/dL      Hemoglobin A1C 12/12/2022 7.6 (H)  4.0 - 5.6 % Final    Comment: ADA Screening Guidelines:  5.7-6.4%  Consistent with prediabetes  >or=6.5%  Consistent with diabetes    High levels of fetal hemoglobin interfere with the HbA1C  assay. Heterozygous hemoglobin variants (HbS, HgC, etc)do  not significantly interfere with this assay.   However, presence of multiple variants may affect accuracy.      Estimated Avg Glucose 12/12/2022 171 (H)  68 - 131 mg/dL Final     Patient Name: Sj Gonzalez   Procedure Date: 9/16/2022 10:14 AM   MRN: [id number]   Account Number: [id number]   YOB: 1955   Age: 67   Room: Room 1   Gender: Male   Attending MD: Vicky Hodges MD   Procedure:             Upper GI endoscopy   Indications:           Epigastric abdominal pain, Nausea   Providers:             Vicky Hodges MD, Allison Desai RN, Britt Agarwal, Technician   Referring MD:          ORLIN Ievrson   Complications:         No immediate complications. Estimated blood loss:                          Minimal.   Estimated Blood Loss:  Estimated blood loss was minimal.   Medicines:             General Anesthesia   Procedure:             Pre-Anesthesia Assessment:                          - Prior to the procedure, a History and Physical                          was performed, and patient medications and                          allergies were reviewed. The patient is competent.                           The risks and benefits of the procedure and the                          sedation options and risks were discussed with the                          patient. All questions were answered and informed                          consent was obtained. Patient identification and                          proposed procedure were verified by the physician,                          the nurse, the anesthesiologist and the                          anesthetist in the pre-procedure area in the                          procedure room. Mental Status Examination: alert                          and oriented. Airway Examination: normal                          oropharyngeal airway and neck mobility.                          Respiratory Examination: clear to auscultation. CV                          Examination: normal. Prophylactic Antibiotics: The                          patient does not require prophylactic antibiotics.                          Prior Anticoagulants: The patient has taken no                          anticoagulant or antiplatelet agents. ASA Grade                          Assessment: II - A patient with mild systemic                          disease. After reviewing the risks and benefits,                          the patient was deemed in satisfactory condition                          to undergo the procedure. The anesthesia plan was                          to use general anesthesia. Immediately prior to                          administration of medications, the patient was                          re-assessed for adequacy to receive sedatives. The                          heart rate, respiratory rate, oxygen saturations,                          blood pressure, adequacy of pulmonary ventilation,                          and response to care were monitored throughout the                          procedure. The physical status of the patient was                          re-assessed after the  procedure.                          After obtaining informed consent, the endoscope                          was passed under direct vision. Throughout the                          procedure, the patient's blood pressure, pulse,                          and oxygen saturations were monitored                          continuously. The Olympus scope GIF-                          ([id number]) was introduced through the mouth, and                          advanced to the second part of duodenum. The upper                          GI endoscopy was accomplished without difficulty.                          The patient tolerated the procedure.   Findings:       The examined esophagus was normal.        A small hiatal hernia was present.        Mildly erythematous mucosa was found in the gastric antrum. Biopsies        were taken with a cold forceps for histology. Verification of        patient identification for the specimen was done by the physician,        nurse and technician. Estimated blood loss was minimal.        The examined duodenum was normal.   Impression:            - Normal esophagus.                          - Small hiatal hernia.                          - Erythematous mucosa in the antrum. Biopsied.                          - Normal examined duodenum.                          - Likely delayed gastric emptying due to Ozempic.   Recommendation:        - Discharge patient to home.                          - Patient has a contact number available for                          emergencies. The signs and symptoms of potential                          delayed complications were discussed with the                          patient. Return to normal activities tomorrow.                          Written discharge instructions were provided to                          the patient.                          - Resume previous diet.                          - Continue present medications including daily                           omeprazole.                          - Tight glucose control.                          - Await pathology results.   MD Vicky Eason MD   9/16/2022 10:48:05 AM   This report has been verified and signed electronically.   Dear patient,   As a result of recent federal legislation (The Federal Cures Act), you may   receive lab or pathology results from your procedure in your [facility]   account before your physician is able to contact you. Your physician or   their representative will relay the results to you with their   recommendations at their soonest availability.   Thank you,   Number of Addenda: 0   Note Initiated On: 9/16/2022 10:14 AM        This report has been verified and signed electronically.     Specimen Collected: 09/16/22 10:14 Last Resulted: 09/16/22 11:45        ASSESSMENT/PLAN:  1. Type 2 diabetes mellitus with hyperglycemia, with long-term current use of insulin  Overview:  Lab Results   Component Value Date    HGBA1C 7.6 (H) 12/12/2022     - A1c worsening   -he is not had his pioglitazone secondary to cost  -he is not uptitrate his Ozempic as directed   -recommend increase Ozempic 0.5 mg weekly and if tolerated uptitrate to 1 mg weekly   -recommend increase Basaglar 30 units daily   -okay to remain off pioglitazone    Orders:  -     semaglutide (OZEMPIC) 1 mg/dose (4 mg/3 mL); Inject 1 mg into the skin every 7 days.  Dispense: 3 pen; Refill: 1  -     insulin (BASAGLAR KWIKPEN U-100 INSULIN) glargine 100 units/mL SubQ pen; Inject 30 Units into the skin once daily.  Dispense: 27 mL; Refill: 3  -     flash glucose sensor (FREESTYLE MAKI 14 DAY SENSOR) Kit; use and change every 14 days  Dispense: 2 kit; Refill: 11    2. Type 2 diabetes mellitus with foot ulcer, with long-term current use of insulin  Overview:  Lab Results   Component Value Date    HGBA1C 7.6 (H) 12/12/2022     - +PAD  - referral to podiatry    Orders:  -     semaglutide (OZEMPIC) 1 mg/dose (4 mg/3 mL);  Inject 1 mg into the skin every 7 days.  Dispense: 3 pen; Refill: 1  -     insulin (BASAGLAR KWIKPEN U-100 INSULIN) glargine 100 units/mL SubQ pen; Inject 30 Units into the skin once daily.  Dispense: 27 mL; Refill: 3    3. Type 2 diabetes mellitus with diabetic peripheral angiopathy and gangrene, without long-term current use of insulin  Overview:  - followed by Endocrinology  Lab Results   Component Value Date    HGBA1C 7.6 (H) 12/12/2022         Orders:  -     semaglutide (OZEMPIC) 1 mg/dose (4 mg/3 mL); Inject 1 mg into the skin every 7 days.  Dispense: 3 pen; Refill: 1  -     insulin (BASAGLAR KWIKPEN U-100 INSULIN) glargine 100 units/mL SubQ pen; Inject 30 Units into the skin once daily.  Dispense: 27 mL; Refill: 3    4. Delayed gastric emptying  Overview:  - 09/16/2022 EGD:  Normal esophagus.  Small hiatal hernia.  Normal duodenum.  Likely delayed gastric emptying due to Ozempic  - monitor for worsening symptoms as we titrate his Ozempic      5. Discoloration of skin of toe  Overview:  - with history of diabetic foot infection and known peripheral arterial disease   -recommend re-evaluation by Podiatry    Orders:  -     Ambulatory referral/consult to Podiatry; Future; Expected date: 12/21/2022    6. 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  No intervention  - goal LDL <70  - BP goal < 130/80  - ASA 81 mg daily  - A1C goal </=7%    Orders:  -     rosuvastatin (CRESTOR) 20 MG tablet; Take 1 tablet (20 mg total) by mouth once daily.  Dispense: 90 tablet; Refill: 3    7. Hyperlipidemia associated with type 2 diabetes mellitus  Overview:  Lab Results   Component Value Date    LDLCALC 83.8 12/12/2022     - LDL goal <70 near goal  - continue  rosuvastatin 20 mg daily    Orders:  -     rosuvastatin (CRESTOR) 20 MG tablet; Take 1 tablet (20 mg total) by mouth once daily.  Dispense: 90 tablet; Refill: 3    8. Class 1 obesity due to excess calories with serious comorbidity and body mass index (BMI) of 33.0 to 33.9 in adult  Overview:  - discussed recommendation for diet and cardiovascular exercise  - counseling on lifestyle modifications for risk factor reduction                ORDERS:   Orders Placed This Encounter    Ambulatory referral/consult to Podiatry    rosuvastatin (CRESTOR) 20 MG tablet    semaglutide (OZEMPIC) 1 mg/dose (4 mg/3 mL)    insulin (BASAGLAR KWIKPEN U-100 INSULIN) glargine 100 units/mL SubQ pen    flash glucose sensor (FREESTYLE MAKI 14 DAY SENSOR) Kit       Vaccines recommended:  Prevnar 20, COVID-19, influenza and shingles vaccine.  He declines all vaccines    Today I discussed that I will no longer be practicing at [facility] effective 1/2023. My new location will be at [facility]. We assisted Sj Gonzalez in establishing with a new provider for follow-up. I encouraged Sj Gonzalez to notify me with an questions or concerns prior to my departure.     Follow-up in 1 month or sooner if any concerns.    This note is dictated using the M*Modal Fluency Direct word recognition program. There are word recognition mistakes that are occasionally missed on review.    Dr. Idalia Alexandra D.O.   Family Medicine

## 2022-12-14 NOTE — Clinical Note
Please assist with referral to Podiatry.  Please schedule 1 month follow-up at [facility] with me for diabetes

## 2022-12-22 ENCOUNTER — TELEPHONE (OUTPATIENT)
Dept: GASTROENTEROLOGY | Facility: CLINIC | Age: 67
End: 2022-12-22
Payer: MEDICARE

## 2022-12-22 DIAGNOSIS — K62.5 RECTAL BLEEDING: Primary | ICD-10-CM

## 2022-12-22 NOTE — TELEPHONE ENCOUNTER
----- Message from Pamela Burkett sent at 12/22/2022  8:51 AM CST -----  Type:  Needs Medical Advice    Who Called: pt  Symptoms (please be specific): pt is having some blood in stool and the pt would like to discuss      Would the patient rather a call back or a response via [facility]? call  Best Call Back Number: [phone]  Additional Information:

## 2022-12-22 NOTE — TELEPHONE ENCOUNTER
Spoke with patient, patient stated that he had blood in his stool on yesterday, but has subsided today. Informed patient that CRYSTAL Rose stated that he has internal hemorrhoids and needs to see a Colorectal provider. Patient was given the number to the main scheduling line to make an appt.

## 2022-12-23 ENCOUNTER — PATIENT MESSAGE (OUTPATIENT)
Dept: PRIMARY CARE CLINIC | Facility: CLINIC | Age: 67
End: 2022-12-23
Payer: MEDICARE

## 2022-12-23 ENCOUNTER — TELEPHONE (OUTPATIENT)
Dept: GASTROENTEROLOGY | Facility: CLINIC | Age: 67
End: 2022-12-23
Payer: MEDICARE

## 2022-12-23 ENCOUNTER — HOSPITAL ENCOUNTER (EMERGENCY)
Facility: HOSPITAL | Age: 67
Discharge: HOME | End: 2022-12-23
Attending: EMERGENCY MEDICINE
Payer: MEDICARE

## 2022-12-23 ENCOUNTER — PATIENT MESSAGE (OUTPATIENT)
Dept: FAMILY MEDICINE | Facility: CLINIC | Age: 67
End: 2022-12-23
Payer: MEDICARE

## 2022-12-23 VITALS
BODY MASS INDEX: 34.13 KG/M2 | DIASTOLIC BLOOD PRESSURE: 93 MMHG | HEIGHT: 72 IN | OXYGEN SATURATION: 100 % | HEART RATE: 82 BPM | WEIGHT: 252 LBS | SYSTOLIC BLOOD PRESSURE: 171 MMHG | TEMPERATURE: 98 F | RESPIRATION RATE: 20 BRPM

## 2022-12-23 DIAGNOSIS — K62.5 RECTAL BLEEDING: Primary | ICD-10-CM

## 2022-12-23 LAB
ABO + RH BLD: NORMAL
ALBUMIN SERPL BCP-MCNC: 3.7 G/DL (ref 3.5–5.2)
ALP SERPL-CCNC: 51 U/L (ref 55–135)
ALT SERPL W/O P-5'-P-CCNC: 37 U/L (ref 10–44)
ANION GAP SERPL CALC-SCNC: 11 MMOL/L (ref 8–16)
AST SERPL-CCNC: 42 U/L (ref 10–40)
BASOPHILS # BLD AUTO: 0.07 K/UL (ref 0–0.2)
BASOPHILS NFR BLD: 0.8 % (ref 0–1.9)
BILIRUB SERPL-MCNC: 0.4 MG/DL (ref 0.1–1)
BLD GP AB SCN CELLS X3 SERPL QL: NORMAL
BUN SERPL-MCNC: 16 MG/DL (ref 8–23)
CALCIUM SERPL-MCNC: 8.6 MG/DL (ref 8.7–10.5)
CHLORIDE SERPL-SCNC: 101 MMOL/L (ref 95–110)
CO2 SERPL-SCNC: 23 MMOL/L (ref 23–29)
CREAT SERPL-MCNC: 0.9 MG/DL (ref 0.5–1.4)
DIFFERENTIAL METHOD: ABNORMAL
EOSINOPHIL # BLD AUTO: 0.2 K/UL (ref 0–0.5)
EOSINOPHIL NFR BLD: 1.7 % (ref 0–8)
ERYTHROCYTE [DISTWIDTH] IN BLOOD BY AUTOMATED COUNT: 11.8 % (ref 11.5–14.5)
EST. GFR  (NO RACE VARIABLE): >60 ML/MIN/1.73 M^2
GLUCOSE SERPL-MCNC: 100 MG/DL (ref 70–110)
HCT VFR BLD AUTO: 39.2 % (ref 40–54)
HGB BLD-MCNC: 13.7 G/DL (ref 14–18)
IMM GRANULOCYTES # BLD AUTO: 0.02 K/UL (ref 0–0.04)
IMM GRANULOCYTES NFR BLD AUTO: 0.2 % (ref 0–0.5)
LACTATE SERPL-SCNC: 2.1 MMOL/L (ref 0.5–2.2)
LYMPHOCYTES # BLD AUTO: 3.1 K/UL (ref 1–4.8)
LYMPHOCYTES NFR BLD: 35.4 % (ref 18–48)
MCH RBC QN AUTO: 33.6 PG (ref 27–31)
MCHC RBC AUTO-ENTMCNC: 34.9 G/DL (ref 32–36)
MCV RBC AUTO: 96 FL (ref 82–98)
MONOCYTES # BLD AUTO: 0.9 K/UL (ref 0.3–1)
MONOCYTES NFR BLD: 10 % (ref 4–15)
NEUTROPHILS # BLD AUTO: 4.5 K/UL (ref 1.8–7.7)
NEUTROPHILS NFR BLD: 51.9 % (ref 38–73)
NRBC BLD-RTO: 0 /100 WBC
OB PNL STL: POSITIVE
PLATELET # BLD AUTO: 170 K/UL (ref 150–450)
PMV BLD AUTO: 9.9 FL (ref 9.2–12.9)
POTASSIUM SERPL-SCNC: 3.7 MMOL/L (ref 3.5–5.1)
PROT SERPL-MCNC: 7.3 G/DL (ref 6–8.4)
RBC # BLD AUTO: 4.08 M/UL (ref 4.6–6.2)
SODIUM SERPL-SCNC: 135 MMOL/L (ref 136–145)
WBC # BLD AUTO: 8.71 K/UL (ref 3.9–12.7)

## 2022-12-23 PROCEDURE — 25500020 PHARM REV CODE 255: Performed by: EMERGENCY MEDICINE

## 2022-12-23 PROCEDURE — 82272 OCCULT BLD FECES 1-3 TESTS: CPT | Performed by: EMERGENCY MEDICINE

## 2022-12-23 PROCEDURE — 85025 COMPLETE CBC W/AUTO DIFF WBC: CPT | Performed by: EMERGENCY MEDICINE

## 2022-12-23 PROCEDURE — 80053 COMPREHEN METABOLIC PANEL: CPT | Performed by: EMERGENCY MEDICINE

## 2022-12-23 PROCEDURE — 99285 EMERGENCY DEPT VISIT HI MDM: CPT | Mod: 25

## 2022-12-23 PROCEDURE — 86850 RBC ANTIBODY SCREEN: CPT | Performed by: EMERGENCY MEDICINE

## 2022-12-23 PROCEDURE — 83605 ASSAY OF LACTIC ACID: CPT | Performed by: EMERGENCY MEDICINE

## 2022-12-23 RX ADMIN — IOHEXOL 130 ML: 350 INJECTION, SOLUTION INTRAVENOUS at 09:12

## 2022-12-23 NOTE — TELEPHONE ENCOUNTER
Spoke with patient.  Rectal bleeding.  Stated it is kind of bad and does not want to wait for an appointment in two weeks.  Made appointment on [facility] at 9 a.m. with Dr. Alexandra, but was instructed to go to ER.  Verbalized understanding and will go to ER.  Will keep appointment with Dr. Alexandra also.

## 2022-12-23 NOTE — TELEPHONE ENCOUNTER
----- Message from Priynaka Goldstein sent at 12/23/2022  1:06 PM CST -----  Type:  Needs Medical Advice    Who Called: Pt  Symptoms (please be specific):  Blood in stool.  How long has patient had these symptoms:    Pharmacy name and phone #:      Pt would like a call back.

## 2022-12-23 NOTE — TELEPHONE ENCOUNTER
Patient called complaining of rectal bleeding and stated that it has different odor. Patient states it is a lot of blood. Patient does not have an appointment with colorectal until 1/12/2023. Advised patient to go to the ED at [facility] because there are no GI doctors on call at this location. Patient agreed to go to the ED.

## 2022-12-24 NOTE — ED NOTES
Patient identifiers for Sj Gonzalez checked and correct.  LOC: The patient is awake, alert and aware of environment with an appropriate affect, the patient is oriented x 3 and speaking appropriately.  APPEARANCE: Patient resting comfortably and in no acute distress, patient is clean and well groomed, patient's clothing are properly fastened.  SKIN: The skin is warm and dry, patient has normal skin turgor and moist mucus membranes.  MUSKULOSKELETAL: Patient moving all extremities well, no obvious swelling or deformities noted.  RESPIRATORY: Airway is open and patent, respirations are spontaneous, patient has a normal effort and rate.  CARDIAC: Patient has a normal rate and rhythm, no periphreal edema noted.  ABDOMEN: Soft and non tender to palpation.

## 2022-12-24 NOTE — ED PROVIDER NOTES
Encounter Date: 12/23/2022       History     Chief Complaint   Patient presents with    Rectal Bleeding     Pt. Reports dark red rectal bleeding x3 days. Moderate intermittent llq abdominal pain. Reports N/V x1 episode. Denies Hematemesis. Associated symptoms are mild dizziness with activity in the last 2 weeks.      Sj Gonzalez is a 67 y.o. male who  has a past medical history of COVID-19 (4/12/2022), Diabetes mellitus, Epigastric pain (9/7/2022), Generalized abdominal pain (3/16/2022), Heart murmur, Hypertension, Non-recurrent unilateral inguinal hernia without obstruction or gangrene (9/30/2021), and Urinary tract infection.    The patient presents to the ED due abdominal pain and dark red bloody stools.  No reports his symptoms started a couple of days ago.  He reports taking laxatives and noticing red and foul-smelling stool.  He reports he did not take laxatives and he has not had a bowel movement today or vomited today but previously has been having some nonbloody emesis associated with his pain.  He reports left lower quadrant pain which has been evaluated for in the past and unchanged today.  He stopped taking a baby aspirin.  He ha an upper endoscopy in 9/16/22 which showed:     Findings:        The examined esophagus was normal.        A small hiatal hernia was present.        Mildly erythematous mucosa was found in the gastric antrum. Biopsies        were taken with a cold forceps for histology.    Review of patient's allergies indicates:   Allergen Reactions    Metformin      GI intolerance    Lisinopril      Past Medical History:   Diagnosis Date    COVID-19 4/12/2022    - COVID-19 positive - counseling on management COVID-19 - COVID risk score equals 5 - Recommend Paxlovid    Diabetes mellitus     Epigastric pain 9/7/2022    Generalized abdominal pain 3/16/2022    - unknown etiology - reviewed x-ray, abdominal ultrasound and colonoscopy - recommend CT scan abdomen pelvis - recommend re-evaluation by  gastroenterology    Heart murmur     MVP    Hypertension     Non-recurrent unilateral inguinal hernia without obstruction or gangrene 9/30/2021    Urinary tract infection      Past Surgical History:   Procedure Laterality Date    AORTOGRAPHY WITH SERIALOGRAPHY N/A 11/4/2021    Procedure: AORTOGRAM, WITH SERIALOGRAPHY;  Surgeon: Jeovany Hart III, MD;  Location: [facility] CATH LAB;  Service: Cardiology;  Laterality: N/A;    ARTHROSCOPY OF KNEE Right     COLONOSCOPY N/A 9/25/2020    Procedure: COLONOSCOPY;  Surgeon: Vicky Hodges MD;  Location: [facility] ENDO;  Service: Endoscopy;  Laterality: N/A;    ESOPHAGOGASTRODUODENOSCOPY N/A 9/16/2022    Procedure: EGD (ESOPHAGOGASTRODUODENOSCOPY);  Surgeon: Vicky Hodges MD;  Location: [facility];  Service: Endoscopy;  Laterality: N/A;    HERNIA REPAIR      ROBOT-ASSISTED LAPAROSCOPIC REPAIR OF INGUINAL HERNIA Right 9/30/2021    Procedure: ROBOTIC REPAIR, HERNIA, INGUINAL;  Surgeon: Carmelo Farooq MD;  Location: [facility] OR;  Service: General;  Laterality: Right;    SPINE SURGERY      Lumbar     Family History   Problem Relation Age of Onset    No Known Problems Mother     No Known Problems Father     Diabetes Sister     Diabetes Brother     No Known Problems Daughter     Cancer Neg Hx     Heart disease Neg Hx     Prostate cancer Neg Hx     Kidney disease Neg Hx      Social History     Tobacco Use    Smoking status: Never    Smokeless tobacco: Never   Substance Use Topics    Alcohol use: Yes     Alcohol/week: 0.0 standard drinks     Comment: daily    Drug use: No     Review of Systems   Constitutional:  Negative for fever.   HENT:  Negative for sore throat.    Respiratory:  Negative for shortness of breath.    Cardiovascular:  Negative for chest pain.   Gastrointestinal:  Positive for abdominal pain and blood in stool. Negative for nausea.   Genitourinary:  Negative for dysuria.   Musculoskeletal:  Negative for back pain.   Skin:  Negative for rash.   Neurological:   Negative for weakness.   Hematological:  Does not bruise/bleed easily.     Physical Exam     Initial Vitals [12/23/22 1759]   BP Pulse Resp Temp SpO2   (!) 181/91 76 20 98.3 °F (36.8 °C) 98 %      MAP       --         Physical Exam    Nursing note and vitals reviewed.  Constitutional: He appears well-developed and well-nourished. He is not diaphoretic. No distress.   HENT:   Head: Normocephalic and atraumatic.   Eyes: Conjunctivae are normal.   Cardiovascular:  Regular rhythm and intact distal pulses.           Abdominal: There is abdominal tenderness.   Mild left lower quadrant tenderness without rebound or guarding   Genitourinary:    Genitourinary Comments: Rectal exam with chaperone RN Siever: No gross blood no melena no hemorrhoids noted       Neurological: He is alert.   Skin: Skin is warm and dry. Capillary refill takes less than 2 seconds. No rash noted.   Psychiatric: He has a normal mood and affect.       ED Course   Procedures  Labs Reviewed   CBC W/ AUTO DIFFERENTIAL - Abnormal; Notable for the following components:       Result Value    RBC 4.08 (*)     Hemoglobin 13.7 (*)     Hematocrit 39.2 (*)     MCH 33.6 (*)     All other components within normal limits   COMPREHENSIVE METABOLIC PANEL - Abnormal; Notable for the following components:    Sodium 135 (*)     Calcium 8.6 (*)     Alkaline Phosphatase 51 (*)     AST 42 (*)     All other components within normal limits   OCCULT BLOOD X 1, STOOL - Abnormal; Notable for the following components:    Occult Blood Positive (*)     All other components within normal limits   LACTIC ACID, PLASMA   TYPE & SCREEN          Imaging Results              CTA Acute GI [place], Abdomen and Pelvis (Final result)  Result time 12/23/22 22:01:35      Final result by Radu Peck MD (12/23/22 22:01:35)                   Impression:      No CT evidence of active gastrointestinal hemorrhage.  Follow-up, as clinically warranted.    Diverticulosis coli without evidence of acute  diverticulitis.    Additional stable findings as above.      Electronically signed by: Radu Peck MD  Date:    12/23/2022  Time:    22:01               Narrative:    EXAMINATION:  CTA of the abdomen pelvis GI bleeding protocol.    CLINICAL HISTORY:  Bloody stools x2 days.    TECHNIQUE:  CTA acute GI bleeding examination was performed using a GI bleeding protocol.  Initial imaging with noncontrast CT scan of the abdomen and pelvis was performed.  This was followed by arterial and delayed phase images of the abdomen and pelvis.  Coronal and sagittal reformats were obtained.  The patient received 130 cc of Omnipaque 350 IV.    COMPARISON:  CT scan of the abdomen pelvis dated 03/22/2022.    FINDINGS:  CTA:    Evaluation of the noncontrast examination demonstrates no evidence of hyperattenuating material within the bowel lumen.    There is normal tapering of the abdominal aorta.  The celiac trunk, the SMA, and PAOLA are within normal limits.  There are single bilateral renal arteries.    There is no CT evidence of active gastrointestinal hemorrhage into the bowel lumen.    CT abdomen and pelvis:    The lung bases are unremarkable.  No pulmonary nodules identified.    The esophagus, stomach, and duodenum are within normal limits.  The small bowel loops are unremarkable.  The appendix is within normal limits.  There is colonic diverticula without evidence of acute diverticulitis.  There is no CT evidence of bowel obstruction.    There is a stable subcentimeter hypodensity in the right hepatic lobe.  The liver is otherwise unremarkable.  The gallbladder is within normal limits.  The biliary tree is unremarkable.  The spleen is unremarkable.  The pancreas is within normal limits.  The adrenal glands are unremarkable.    There are subcentimeter hypodensities in the right kidney.  These are too small complete characterization.  The ureters and urinary bladder are unremarkable.  The prostate gland is enlarged.    There is no  evidence of free fluid in the abdomen or pelvis.  There is no evidence of free air.  There is no evidence of pneumatosis.  No portal venous air is identified.    The psoas margins are unremarkable.  There are bilateral fat containing inguinal hernias.  The remainder of the abdominal wall is unremarkable.  There are degenerative changes in the osseous structures.  There is no evidence of a fracture.                                       Medications   iohexoL (OMNIPAQUE 350) injection 130 mL (130 mLs Intravenous Given 12/23/22 2138)     Medical Decision Making:   Differential Diagnosis:   Differential Diagnosis includes, but is not limited to:  Lower GI bleed (AVM, colitis, colon cancer, polyp, internal/external hemorrhoid, IBS, rectal injury/foreign body, chronic diarrhea, anal fissure), upper GI bleed (PUD, perforated ulcer, esophageal variceal bleed), Crohn's disease, ulcerative colitis, chronic diarrhea, dietary intake    ED Management:  CTA demonstrates no evidence of acute GI bleed.  Diverticulosis is noted without signs to suggest active bleed/diverticulitis.    Patient is resting comfortably on reassessment.  No distress noted.  Declined pain medications.  Patient's hemoglobin although is mildly decreased from 10 months ago is reassuring as is patient's vital signs physical exam and lack of GI bleeding today.  Patient was advised to follow-up closely with his gastroenterologist referral was placed in case he can not follow-up with them.  Strict ED return precautions for return of bleeding or new symptoms such as blood in his vomit fevers or any other concerns,    After taking into careful account the historical factors and physical exam findings of the patient's presentation today, in conjunction with the empirical and objective data obtained on ED workup, no acute emergent medical condition has been identified. The patient appears to be low risk for an emergent medical condition and I feel it is safe and  appropriate at this time for the patient to be discharged to follow-up as detailed in their discharge instructions for reevaluation and possible continued outpatient workup and management. I have discussed the specifics of the workup with the patient and the patient has verbalized understanding of the details of the workup, the diagnosis, the treatment plan, and the need for outpatient follow-up.  Although the patient has no emergent etiology today this does not preclude the development of an emergent condition so in addition, I have advised the patient that they can return to the ED and/or activate EMS at any time with worsening of their symptoms, change of their symptoms, or with any other medical complaint.  The patient remained comfortable and stable during their visit in the ED.  Discharge and follow-up instructions discussed with the patient who expressed understanding and willingness to comply with my recommendations.             ED Course as of 12/24/22 0036   Fri Dec 23, 2022   2205 CBC auto differential(!)  Mild decrease in hemoglobin from 10 months ago. [RN]   2205 Lactic acid, plasma  Negative [RN]   2205 Type & Screen  O-positive [RN]   2205 Comprehensive metabolic panel(!) [RN]   2206 Comprehensive metabolic panel(!)  Sodium 135, calcium 8.6, alk phos 51, AST 42 [RN]      ED Course User Index  [RN] Jose Villalba Jr., MD                 Clinical Impression:   Final diagnoses:  [K62.5] Rectal bleeding (Primary)        ED Disposition Condition    Discharge Stable          ED Prescriptions    None       Follow-up Information       Follow up With Specialties Details Why Contact Info Additional Information    Idalia Alexandra,  Family Medicine, Internal Medicine In 3 days  [address]  [address]   [facility] [address] [phone]       [facility]   [address]  [facility] [address] [phone] Please park in [address] or [address] and use [address]  entrance. Take Medical Office Bldg elevators.            Portions of this note were dictated using voice recognition software and may contain dictation related errors in spelling/grammar/syntax not found on text review       Jose Villalba Jr., MD  12/24/22 0039

## 2022-12-29 PROBLEM — K62.5 RECTAL BLEEDING: Status: ACTIVE | Noted: 2022-12-29

## 2023-01-18 ENCOUNTER — PATIENT MESSAGE (OUTPATIENT)
Dept: PRIMARY CARE CLINIC | Facility: CLINIC | Age: 68
End: 2023-01-18
Payer: MEDICARE

## 2023-01-18 DIAGNOSIS — Z79.4 TYPE 2 DIABETES MELLITUS WITH FOOT ULCER, WITH LONG-TERM CURRENT USE OF INSULIN: ICD-10-CM

## 2023-01-18 DIAGNOSIS — L97.509 TYPE 2 DIABETES MELLITUS WITH FOOT ULCER, WITH LONG-TERM CURRENT USE OF INSULIN: ICD-10-CM

## 2023-01-18 DIAGNOSIS — Z79.4 TYPE 2 DIABETES MELLITUS WITH HYPERGLYCEMIA, WITH LONG-TERM CURRENT USE OF INSULIN: ICD-10-CM

## 2023-01-18 DIAGNOSIS — E11.52 TYPE 2 DIABETES MELLITUS WITH DIABETIC PERIPHERAL ANGIOPATHY AND GANGRENE, WITHOUT LONG-TERM CURRENT USE OF INSULIN: ICD-10-CM

## 2023-01-18 DIAGNOSIS — E11.65 TYPE 2 DIABETES MELLITUS WITH HYPERGLYCEMIA, WITH LONG-TERM CURRENT USE OF INSULIN: ICD-10-CM

## 2023-01-18 DIAGNOSIS — E11.621 TYPE 2 DIABETES MELLITUS WITH FOOT ULCER, WITH LONG-TERM CURRENT USE OF INSULIN: ICD-10-CM

## 2023-01-18 DIAGNOSIS — D64.9 NORMOCYTIC ANEMIA: Primary | ICD-10-CM

## 2023-01-18 RX ORDER — INSULIN GLARGINE 100 [IU]/ML
30 INJECTION, SOLUTION SUBCUTANEOUS DAILY
Qty: 27 ML | Refills: 3 | Status: SHIPPED | OUTPATIENT
Start: 2023-01-18 | End: 2023-01-18 | Stop reason: SDUPTHER

## 2023-01-18 RX ORDER — INSULIN GLARGINE 100 [IU]/ML
30 INJECTION, SOLUTION SUBCUTANEOUS DAILY
Qty: 27 ML | Refills: 0 | Status: SHIPPED | OUTPATIENT
Start: 2023-01-18 | End: 2023-03-29 | Stop reason: SDUPTHER

## 2023-01-18 RX ORDER — SEMAGLUTIDE 1.34 MG/ML
1 INJECTION, SOLUTION SUBCUTANEOUS
Qty: 3 PEN | Refills: 0 | Status: SHIPPED | OUTPATIENT
Start: 2023-01-18 | End: 2023-02-13

## 2023-01-18 RX ORDER — SEMAGLUTIDE 1.34 MG/ML
1 INJECTION, SOLUTION SUBCUTANEOUS
Qty: 3 PEN | Refills: 1 | Status: SHIPPED | OUTPATIENT
Start: 2023-01-18 | End: 2023-01-18 | Stop reason: SDUPTHER

## 2023-01-18 NOTE — TELEPHONE ENCOUNTER
Rx sent. Sj Gonzalez missed last visit with me and cancelled upcoming. He can either rescheduled or wait til labs next due. Next labs due 3/23/23. Okay for virtual appt if easier for him    Labs ordered if he would like to wait until 3/2023  Orders Placed This Encounter    Comprehensive Metabolic Panel    CBC Auto Differential    Hemoglobin A1C    semaglutide (OZEMPIC) 1 mg/dose (4 mg/3 mL)    insulin (BASAGLAR KWIKPEN U-100 INSULIN) glargine 100 units/mL SubQ pen     Dr. Idalia Alexandra D.O.   Family Medicine

## 2023-01-18 NOTE — TELEPHONE ENCOUNTER
Refill Encounter    PCP Visits: Recent Visits  Date Type Provider Dept   12/14/22 Office Visit Idalia Alexandra, DO [facility]   06/14/22 Office Visit Idalia Alexandra, DO [facility]   04/12/22 Office Visit Idalia Alexandra, DO [facility]   03/16/22 Office Visit Idalia Alexandra, DO [facility]   Showing recent visits within past 360 days and meeting all other requirements  Future Appointments  No visits were found meeting these conditions.  Showing future appointments within next 720 days and meeting all other requirements     Last 3 Blood Pressure:   BP Readings from Last 3 Encounters:   12/23/22 (!) 171/93   09/16/22 122/70   09/07/22 (!) 153/81     Preferred Pharmacy:   [id number] - [place] - [address]  [address]  [place] [address]  Phone: [phone] Fax: [phone]    [facility]/pharmacy #[id number] - [place] - [address] AT [facility]  [address]  [place] [address]  Phone: [phone] Fax: [phone]    [facility] - [place]  [address]  [address] [place] [address]  Phone: [phone] Fax: [phone]    [facility] [place] - [facility] AT [address] to [facility]  [place] [address]  Phone: [phone] Fax: [phone]    Requested RX:  Requested Prescriptions     Pending Prescriptions Disp Refills    semaglutide (OZEMPIC) 1 mg/dose (4 mg/3 mL) 3 pen 1     Sig: Inject 1 mg into the skin every 7 days.    insulin (BASAGLAR KWIKPEN U-100 INSULIN) glargine 100 units/mL SubQ pen 27 mL 3     Sig: Inject 30 Units into the skin once daily.      RX Route: Normal

## 2023-01-18 NOTE — TELEPHONE ENCOUNTER
No new care gaps identified.  [facility] Embedded Care Gaps. Reference number: [id number]. 1/18/2023   2:30:27 PM CST

## 2023-01-18 NOTE — TELEPHONE ENCOUNTER
No new care gaps identified.  [facility] Embedded Care Gaps. Reference number: [id number]. 1/18/2023   4:35:04 PM CST

## 2023-01-18 NOTE — TELEPHONE ENCOUNTER
I went ahead and changed the pharmacy and pended the medications. Pt stated that he is out of meds and he needs them as soon as possible.

## 2023-01-19 ENCOUNTER — TELEPHONE (OUTPATIENT)
Dept: PRIMARY CARE CLINIC | Facility: CLINIC | Age: 68
End: 2023-01-19
Payer: MEDICARE

## 2023-01-19 NOTE — TELEPHONE ENCOUNTER
----- Message from Idalia Alexandra DO sent at 1/18/2023  5:04 PM CST -----  Regarding: please reschedule appt  Please reschedule his Appt to be after his 3/20/23 labs.

## 2023-01-23 ENCOUNTER — TELEPHONE (OUTPATIENT)
Dept: PRIMARY CARE CLINIC | Facility: CLINIC | Age: 68
End: 2023-01-23
Payer: MEDICARE

## 2023-01-23 NOTE — TELEPHONE ENCOUNTER
----- Message from Ang Bolton sent at 1/23/2023 10:29 AM CST -----  Regarding: Call Back Request  Name of Who is Calling: RICK SHUKLA [[id number]]           What is the request in detail: Patient is requesting a call back to verify appointments.  Please assist.           Can the clinic reply by [facility]: No           [facility] [phone]

## 2023-01-23 NOTE — TELEPHONE ENCOUNTER
Pt states that he would like his annual on 3/29/23. Reschedule appt to an in person appt so annual physical can be completed at the visit. Pt verbalized understanding.

## 2023-03-28 PROBLEM — L81.9 DISCOLORATION OF SKIN OF TOE: Status: RESOLVED | Noted: 2022-12-14 | Resolved: 2023-03-28

## 2023-03-28 PROBLEM — N28.9 ACUTE RENAL INSUFFICIENCY: Status: ACTIVE | Noted: 2023-03-28

## 2023-03-28 PROBLEM — K62.5 RECTAL BLEEDING: Status: RESOLVED | Noted: 2022-12-29 | Resolved: 2023-03-28

## 2023-03-28 NOTE — PROGRESS NOTES
FAMILY MEDICINE  [facility]    Reason for visit:   Chief Complaint   Patient presents with    Annual Exam         SUBJECTIVE: Sj Gonzalez is a 67 y.o. male   obesity, hypertension, hyperlipidemia, type 2 diabetes, lumbar degenerative disc disease, and peripheral arterial disease presents for diabetes follow-up     Cardiology: Dr. Jeovany Hart   Endocrinology Dr. Guzman  Podiatry Dr. Zepeda  Gastroenterology: Dr. Hodges    Sj Gonzalez reports that he had a viral gastroenteritis that was pretty bad about 3-4 weeks ago.     He also notes that he had 2 episodes of chest pain about 2 weeks ago. He reports that he was doing his regular activities and got a tightness in the center of his chest. He denies any radiation and it did not keep him from his activities. He reports that it last about 1 hour resolved and then recurred. He denies prior similar episodes.  He does have a history of vascular disease with minimal plaquing of the distal arteries.     1. Diabetes Type 2     Endocrinology Dr. Guzman     Current Outpatient Medications on File Prior to Visit:  semaglutide (OZEMPIC) 1 mg/dose (4 mg/3 mL), Inject 1 mg into the skin every 7 days., Disp: 3 pen, Rfl: 1  Insulin (BASAGLAR KWIKPEN U-100 INSULIN) glargine 100 units/mL SubQ pen, Inject 30 Units into the skin once daily., Disp: 27 mL, Rfl: 0    Side effects from treatment:  Nausea reports that has improved     Prior medication:   Metformin (stomach cramps)  Trulicity (too expensive)   Glipizide (discontinue started Trulicity with Basaglar)  Pioglitazone (cost)    Complications of diabetes: history of diabetic foot ulcer healed; PAD     Glucometer: FreeStyle Jaelyn   Glucose monitoring: premeal and bedtime    Hemoglobin A1C       Date                     Value               Ref Range           Status                03/21/2023               7.0 (H)             4.0 - 5.6 %         Final              Comment:    ADA Screening Guidelines:  5.7-6.4%   Consistent with prediabetes  >or=6.5%  Consistent with diabetes    High levels of fetal hemoglobin interfere with the HbA1C  assay. Heterozygous hemoglobin variants (HbS, HgC, etc)do  not significantly interfere with this assay.   However, presence of multiple variants may affect accuracy.         12/12/2022               7.6 (H)             4.0 - 5.6 %         Final              Comment:    ADA Screening Guidelines:  5.7-6.4%  Consistent with prediabetes  >or=6.5%  Consistent with diabetes    High levels of fetal hemoglobin interfere with the HbA1C  assay. Heterozygous hemoglobin variants (HbS, HgC, etc)do  not significantly interfere with this assay.   However, presence of multiple variants may affect accuracy.         06/03/2022               6.5 (H)             4.0 - 5.6 %         Final              Comment:    ADA Screening Guidelines:  5.7-6.4%  Consistent with prediabetes  >or=6.5%  Consistent with diabetes    High levels of fetal hemoglobin interfere with the HbA1C  assay. Heterozygous hemoglobin variants (HbS, HgC, etc)do  not significantly interfere with this assay.   However, presence of multiple variants may affect accuracy.    ---------    Low dose statin: Rosuvastatin   Last eye exam:6/8/22  Last foot exam: 3/29/2023     Vaccines:   Influenza:  Due each flu season and pt declined  Prevnar 20: recommended and he declines  Covid-19 recommended and he declines           Review of Systems   All other systems reviewed and are negative.    HEALTH MAINTENANCE:   Health Maintenance   Topic Date Due    Eye Exam  06/08/2023    Hemoglobin A1c  09/21/2023    Lipid Panel  12/12/2023    PROSTATE-SPECIFIC ANTIGEN  03/21/2024    High Dose Statin  03/29/2024    Aspirin/Antiplatelet Therapy  03/29/2024    Foot Exam  03/29/2024    TETANUS VACCINE  06/07/2032    Hepatitis C Screening  Completed     Health Maintenance Topics with due status: Not Due       Topic Last Completion Date    Colorectal Cancer Screening 09/25/2020     TETANUS VACCINE 06/07/2022    Eye Exam 06/08/2022    Lipid Panel 12/12/2022    PROSTATE-SPECIFIC ANTIGEN 03/21/2023    Diabetes Urine Screening 03/21/2023    Hemoglobin A1c 03/21/2023    High Dose Statin 03/29/2023    Aspirin/Antiplatelet Therapy 03/29/2023    Foot Exam 03/29/2023     Health Maintenance Due   Topic Date Due    COVID-19 Vaccine (1) Never done    Pneumococcal Vaccines (Age 65+) (1 - PCV) Never done    Shingles Vaccine (1 of 2) Never done    Influenza Vaccine (1) 09/01/2022       HISTORY:   Past Medical History:   Diagnosis Date    COVID-19 4/12/2022    - COVID-19 positive - counseling on management COVID-19 - COVID risk score equals 5 - Recommend Paxlovid    Diabetes mellitus     Epigastric pain 9/7/2022    Generalized abdominal pain 3/16/2022    - unknown etiology - reviewed x-ray, abdominal ultrasound and colonoscopy - recommend CT scan abdomen pelvis - recommend re-evaluation by gastroenterology    Heart murmur     MVP    Hypertension     Non-recurrent unilateral inguinal hernia without obstruction or gangrene 9/30/2021    Urinary tract infection        Past Surgical History:   Procedure Laterality Date    AORTOGRAPHY WITH SERIALOGRAPHY N/A 11/04/2021    Procedure: AORTOGRAM, WITH SERIALOGRAPHY;  Surgeon: Jeovany Hart III, MD;  Location: [facility] CATH LAB;  Service: Cardiology;  Laterality: N/A;    ARTHROSCOPY OF KNEE Right     COLONOSCOPY N/A 09/25/2020    Procedure: COLONOSCOPY;  Surgeon: Vicky Hodges MD;  Location: [facility] ENDO;  Service: Endoscopy;  Laterality: N/A;    ESOPHAGOGASTRODUODENOSCOPY N/A 09/16/2022    Procedure: EGD (ESOPHAGOGASTRODUODENOSCOPY);  Surgeon: Vicky Hodges MD;  Location: [facility] ENDO;  Service: Endoscopy;  Laterality: N/A;    HERNIA REPAIR      ROBOT-ASSISTED LAPAROSCOPIC REPAIR OF INGUINAL HERNIA Right 09/30/2021    Procedure: ROBOTIC REPAIR, HERNIA, INGUINAL;  Surgeon: Carmelo Farooq MD;  Location: [facility] OR;  Service: General;  Laterality: Right;     "SPINE SURGERY      Lumbar       Family History   Problem Relation Age of Onset    No Known Problems Mother     No Known Problems Father     Diabetes Sister     Diabetes Brother     No Known Problems Daughter     Cancer Neg Hx     Heart disease Neg Hx     Prostate cancer Neg Hx     Kidney disease Neg Hx        Social History     Tobacco Use    Smoking status: Never     Passive exposure: Never    Smokeless tobacco: Never   Substance Use Topics    Alcohol use: Yes     Comment: daily    Drug use: No       Social History     Social History Narrative    He lives in [facility]. He lives with his wife and 2 children. His children are attending [employer]. He lived [place]. . Nonsmoker. Social drinker. 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 His son attended [employer].        ALLERGIES:   Review of patient's allergies indicates:   Allergen Reactions    Metformin      GI intolerance    Lisinopril        MEDS:   Outpatient Medications as of 3/29/2023   Medication Sig Dispense Refill    amLODIPine (NORVASC) 10 MG tablet Take 1 tablet (10 mg total) by mouth once daily. 90 tablet 3    aspirin 81 MG Chew Take 1 tablet (81 mg total) by mouth once daily. 30 tablet 1    blood sugar diagnostic Strp Test blood sugar 3 (three) times daily before meals. 100 strip 11    flash glucose scanning reader (Dekk[employer] MAKI 14 DAY READER) Misc use once daily 1 each 0    losartan-hydrochlorothiazide 100-25 mg (HYZAAR) 100-25 mg per tablet Take 1 tablet by mouth once daily. 90 tablet 1    omeprazole (PRILOSEC) 40 MG capsule Take 1 capsule (40 mg total) by mouth once daily. 30 capsule 5    pen needle, diabetic 32 gauge x 5/32" Ndle Use nightly as directed with insulin pen. 100 each 5    rosuvastatin (CRESTOR) 20 MG tablet Take 1 tablet (20 mg total) by mouth once daily. 90 tablet 3    semaglutide (OZEMPIC) 1 mg/dose (4 mg/3 mL) Inject 1 mg into the skin every 7 days. 3 pen 1    insulin (BASAGLAR KWIKPEN U-100 INSULIN) glargine 100 units/mL " SubQ pen Inject 30 Units into the skin once daily. 27 mL 3    valACYclovir (VALTREX) 1000 MG tablet 1 tab po BID x 5 days prn cold sore 60 tablet 11     No current facility-administered medications on file as of 3/29/2023.       Vital signs:   Vitals:    03/29/23 1114   BP: 122/78   Pulse: 85   SpO2: 97%   Weight: 111.3 kg (245 lb 4.2 oz)   Height: 6' (1.829 m)     Body mass index is 33.26 kg/m².    PHYSICAL EXAM:     Physical Exam  Vitals reviewed.   Constitutional:       General: He is not in acute distress.     Appearance: Normal appearance.   HENT:      Head: Normocephalic and atraumatic.      Right Ear: Tympanic membrane and ear canal normal.      Left Ear: Tympanic membrane and ear canal normal.      Nose: Nose normal.      Mouth/Throat:      Pharynx: Uvula midline.   Eyes:      General: No scleral icterus.     Conjunctiva/sclera: Conjunctivae normal.   Neck:      Thyroid: No thyromegaly.      Vascular: Normal carotid pulses. No carotid bruit or JVD.      Trachea: Trachea normal.   Cardiovascular:      Rate and Rhythm: Normal rate and regular rhythm.      Pulses:           Dorsalis pedis pulses are 2+ on the right side and 1+ on the left side.        Posterior tibial pulses are 2+ on the right side and 2+ on the left side.      Heart sounds: Normal heart sounds. No murmur heard.    No friction rub. No gallop.   Pulmonary:      Effort: Pulmonary effort is normal.      Breath sounds: Normal breath sounds. No decreased breath sounds, wheezing, rhonchi or rales.   Abdominal:      General: Bowel sounds are normal.      Palpations: Abdomen is soft. There is no hepatomegaly.      Tenderness: There is no abdominal tenderness.   Musculoskeletal:      Cervical back: Neck supple.      Right lower leg: No edema.      Left lower leg: No edema.   Feet:      Right foot:      Protective Sensation: 5 sites tested.  5 sites sensed.      Skin integrity: Skin integrity normal. No ulcer, blister, skin breakdown or callus.       Left foot:      Protective Sensation: 5 sites tested.  5 sites sensed.      Skin integrity: Skin integrity normal. No ulcer, blister, skin breakdown or callus.   Lymphadenopathy:      Cervical: No cervical adenopathy.   Skin:     General: Skin is warm.      Capillary Refill: Capillary refill takes less than 2 seconds.      Nails: There is no clubbing.   Neurological:      Mental Status: He is alert and oriented to person, place, and time.           PERTINENT RESULTS:   No visits with results within 1 Week(s) from this visit.   Latest known visit with results is:   Lab Visit on 03/21/2023   Component Date Value Ref Range Status    Sodium 03/21/2023 141  136 - 145 mmol/L Final    Potassium 03/21/2023 4.5  3.5 - 5.1 mmol/L Final    Chloride 03/21/2023 104  95 - 110 mmol/L Final    CO2 03/21/2023 32 (H)  23 - 29 mmol/L Final    Glucose 03/21/2023 164 (H)  70 - 110 mg/dL Final    BUN 03/21/2023 26 (H)  2 - 20 mg/dL Final    Creatinine 03/21/2023 1.46 (H)  0.50 - 1.40 mg/dL Final    Calcium 03/21/2023 9.0  8.7 - 10.5 mg/dL Final    Total Protein 03/21/2023 7.4  6.0 - 8.4 g/dL Final    Albumin 03/21/2023 4.2  3.5 - 5.2 g/dL Final    Total Bilirubin 03/21/2023 0.9  0.1 - 1.0 mg/dL Final    Comment: For infants and newborns, interpretation of results should be based  on gestational age, weight and in agreement with clinical  observations.    Premature Infant recommended reference ranges:  Up to 24 hours.............<8.0 mg/dL  Up to 48 hours............<12.0 mg/dL  3-5 days..................<15.0 mg/dL  6-29 days.................<15.0 mg/dL      Alkaline Phosphatase 03/21/2023 57  38 - 126 U/L Final    AST 03/21/2023 44  15 - 46 U/L Final    ALT 03/21/2023 52 (H)  10 - 44 U/L Final    Anion Gap 03/21/2023 5 (L)  8 - 16 mmol/L Final    eGFR 03/21/2023 53.0 (A)  >60 mL/min/1.73 m^2 Final    WBC 03/21/2023 7.45  3.90 - 12.70 K/uL Final    RBC 03/21/2023 3.96 (L)  4.60 - 6.20 M/uL Final    Hemoglobin 03/21/2023 13.4 (L)  14.0 -  18.0 g/dL Final    Hematocrit 03/21/2023 37.7 (L)  40.0 - 54.0 % Final    MCV 03/21/2023 95  82 - 98 fL Final    MCH 03/21/2023 33.8 (H)  27.0 - 31.0 pg Final    MCHC 03/21/2023 35.5  32.0 - 36.0 g/dL Final    RDW 03/21/2023 11.4 (L)  11.5 - 14.5 % Final    Platelets 03/21/2023 213  150 - 450 K/uL Final    MPV 03/21/2023 9.7  9.2 - 12.9 fL Final    Immature Granulocytes 03/21/2023 0.3  0.0 - 0.5 % Final    Gran # (ANC) 03/21/2023 4.6  1.8 - 7.7 K/uL Final    Immature Grans (Abs) 03/21/2023 0.02  0.00 - 0.04 K/uL Final    Comment: Mild elevation in immature granulocytes is non specific and   can be seen in a variety of conditions including stress response,   acute inflammation, trauma and pregnancy. Correlation with other   laboratory and clinical findings is essential.      Lymph # 03/21/2023 2.0  1.0 - 4.8 K/uL Final    Mono # 03/21/2023 0.8  0.3 - 1.0 K/uL Final    Eos # 03/21/2023 0.1  0.0 - 0.5 K/uL Final    Baso # 03/21/2023 0.05  0.00 - 0.20 K/uL Final    nRBC 03/21/2023 0  0 /100 WBC Final    Gran % 03/21/2023 61.1  38.0 - 73.0 % Final    Lymph % 03/21/2023 26.6  18.0 - 48.0 % Final    Mono % 03/21/2023 10.1  4.0 - 15.0 % Final    Eosinophil % 03/21/2023 1.2  0.0 - 8.0 % Final    Basophil % 03/21/2023 0.7  0.0 - 1.9 % Final    Differential Method 03/21/2023 Automated   Final    Hemoglobin A1C 03/21/2023 7.0 (H)  4.0 - 5.6 % Final    Comment: ADA Screening Guidelines:  5.7-6.4%  Consistent with prediabetes  >or=6.5%  Consistent with diabetes    High levels of fetal hemoglobin interfere with the HbA1C  assay. Heterozygous hemoglobin variants (HbS, HgC, etc)do  not significantly interfere with this assay.   However, presence of multiple variants may affect accuracy.      Estimated Avg Glucose 03/21/2023 154 (H)  68 - 131 mg/dL Final    PSA, Screen 03/21/2023 0.52  0.00 - 4.00 ng/mL Final    Comment: The testing method is a chemiluminescent microparticle immunoassay   manufactured by [employer] and  performed on the    or   [employer] system. Values obtained with different assay manufacturers   for   methods may be different and cannot be used interchangeably.  PSA Expected levels:  Hormonal Therapy: <0.05 ng/ml  Prostatectomy: <0.01 ng/ml  Radiation Therapy: <1.00 ng/ml      Microalbumin, Urine 03/21/2023 18.0  ug/mL Final    Creatinine, Urine 03/21/2023 178.0  23.0 - 375.0 mg/dL Final    Microalb/Creat Ratio 03/21/2023 10.1  0.0 - 30.0 ug/mg Final       ASSESSMENT/PLAN:    1. Chest pain, unspecified type  Overview:  - discuss possible etiologies  - has resolved  - + known PAD   - recommend Cardiology evaluation   - discuss if recurs recommend to ER  - already on high dose statin and ASA    Orders:  -     Ambulatory referral/consult to Cardiology; Future; Expected date: 04/05/2023    2. Type 2 diabetes mellitus with diabetic peripheral angiopathy and gangrene, without long-term current use of insulin  Overview:    Lab Results   Component Value Date    HGBA1C 7.0 (H) 03/21/2023     - well controlled  - continue current management plan   - patient encouraged to notify me with any changes    Orders:  -     Hemoglobin A1C; Future; Expected date: 06/29/2023  -     insulin (BASAGLAR KWIKPEN U-100 INSULIN) glargine 100 units/mL SubQ pen; Inject 30 Units into the skin once daily.  Dispense: 27 mL; Refill: 3  -     flash glucose sensor (FREESTYLE MAKI 14 DAY SENSOR) Kit; use and change every 14 days  Dispense: 2 kit; Refill: 11  -     Comprehensive Metabolic Panel; Future; Expected date: 06/29/2023    3. Hypertension associated with type 2 diabetes mellitus  Overview:  - well controlled  - continue current medications      4. Hyperlipidemia associated with type 2 diabetes mellitus  Overview:  Lab Results   Component Value Date    LDLCALC 83.8 12/12/2022     - LDL goal <70 near goal  - continue rosuvastatin 20 mg daily      5. 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  No intervention  - goal LDL <70  - BP goal < 130/80  - ASA 81 mg daily  - A1C goal </=7%      6. Acute renal insufficiency  Overview:  BMP  Lab Results   Component Value Date     03/21/2023    K 4.5 03/21/2023     03/21/2023    CO2 32 (H) 03/21/2023    BUN 26 (H) 03/21/2023    CREATININE 1.46 (H) 03/21/2023    CALCIUM 9.0 03/21/2023    ANIONGAP 5 (L) 03/21/2023    EGFRNORACEVR 53.0 (A) 03/21/2023     - no prior issues  - he does not drink water and only drinks tea  - recent gastroenteritis  - counseling on renal health  - monitor    Orders:  -     Comprehensive Metabolic Panel; Future; Expected date: 06/29/2023    7. Elevated LFTs  Overview:  Lab Results   Component Value Date    ALT 52 (H) 03/21/2023    AST 44 03/21/2023    ALKPHOS 57 03/21/2023    BILITOT 0.9 03/21/2023     - possible 2/2 to recent viral gastroenteritis  - monitor      8. Normocytic anemia  Overview:  Lab Results   Component Value Date    WBC 7.45 03/21/2023    HGB 13.4 (L) 03/21/2023    HCT 37.7 (L) 03/21/2023    MCV 95 03/21/2023     03/21/2023     - stable    Orders:  -     IRON AND TIBC; Future; Expected date: 06/29/2023    9. Herpes labialis  Overview:  - well controlled  - continue current management plan   - patient encouraged to notify me with any changes    Orders:  -     valACYclovir (VALTREX) 1000 MG tablet; 1 tab po BID x 5 days prn cold sore  Dispense: 60 tablet; Refill: 11  -     acyclovir 5% (ZOVIRAX) 5 % ointment; Apply topically 5 (five) times daily.  Dispense: 15 g; Refill: 11    10. Class 1 obesity due to excess calories with serious comorbidity and body mass index (BMI) of 33.0 to 33.9 in adult  Overview:  - discussed recommendation for diet and cardiovascular exercise  - counseling on  lifestyle modifications for risk factor reduction              ORDERS:   Orders Placed This Encounter    Hemoglobin A1C    IRON AND TIBC    Comprehensive Metabolic Panel    Ambulatory referral/consult to Cardiology    valACYclovir (VALTREX) 1000 MG tablet    acyclovir 5% (ZOVIRAX) 5 % ointment    insulin (BASAGLAR KWIKPEN U-100 INSULIN) glargine 100 units/mL SubQ pen    flash glucose sensor (FREESTYLE MAKI 14 DAY SENSOR) Kit       Vaccines recommended: PCV 20, shingles, flu and covid-19 booster. He declines all vaccines    Follow-up in 3 months with labs virtual or sooner if any concerns.      This note is dictated using the M*Modal Fluency Direct word recognition program. There are word recognition mistakes that are occasionally missed on review.    Dr. Idalia Alexandra D.O.   Family Medicine

## 2023-03-29 ENCOUNTER — OFFICE VISIT (OUTPATIENT)
Dept: PRIMARY CARE CLINIC | Facility: CLINIC | Age: 68
End: 2023-03-29
Attending: FAMILY MEDICINE
Payer: MEDICARE

## 2023-03-29 VITALS
OXYGEN SATURATION: 97 % | HEART RATE: 85 BPM | DIASTOLIC BLOOD PRESSURE: 78 MMHG | HEIGHT: 72 IN | BODY MASS INDEX: 33.22 KG/M2 | SYSTOLIC BLOOD PRESSURE: 122 MMHG | WEIGHT: 245.25 LBS

## 2023-03-29 DIAGNOSIS — B00.1 HERPES LABIALIS: ICD-10-CM

## 2023-03-29 DIAGNOSIS — E11.59 HYPERTENSION ASSOCIATED WITH TYPE 2 DIABETES MELLITUS: ICD-10-CM

## 2023-03-29 DIAGNOSIS — E11.52 TYPE 2 DIABETES MELLITUS WITH DIABETIC PERIPHERAL ANGIOPATHY AND GANGRENE, WITHOUT LONG-TERM CURRENT USE OF INSULIN: ICD-10-CM

## 2023-03-29 DIAGNOSIS — D64.9 NORMOCYTIC ANEMIA: ICD-10-CM

## 2023-03-29 DIAGNOSIS — E11.69 HYPERLIPIDEMIA ASSOCIATED WITH TYPE 2 DIABETES MELLITUS: ICD-10-CM

## 2023-03-29 DIAGNOSIS — E78.5 HYPERLIPIDEMIA ASSOCIATED WITH TYPE 2 DIABETES MELLITUS: ICD-10-CM

## 2023-03-29 DIAGNOSIS — E66.09 CLASS 1 OBESITY DUE TO EXCESS CALORIES WITH SERIOUS COMORBIDITY AND BODY MASS INDEX (BMI) OF 33.0 TO 33.9 IN ADULT: ICD-10-CM

## 2023-03-29 DIAGNOSIS — I70.263 ATHEROSCLEROSIS OF NATIVE ARTERY OF BOTH LOWER EXTREMITIES WITH GANGRENE: ICD-10-CM

## 2023-03-29 DIAGNOSIS — R07.9 CHEST PAIN, UNSPECIFIED TYPE: Primary | ICD-10-CM

## 2023-03-29 DIAGNOSIS — R79.89 ELEVATED LFTS: ICD-10-CM

## 2023-03-29 DIAGNOSIS — N28.9 ACUTE RENAL INSUFFICIENCY: ICD-10-CM

## 2023-03-29 DIAGNOSIS — I15.2 HYPERTENSION ASSOCIATED WITH TYPE 2 DIABETES MELLITUS: ICD-10-CM

## 2023-03-29 PROCEDURE — 99214 OFFICE O/P EST MOD 30 MIN: CPT | Mod: PBBFAC,PN | Performed by: FAMILY MEDICINE

## 2023-03-29 PROCEDURE — 99214 OFFICE O/P EST MOD 30 MIN: CPT | Mod: S$PBB,,, | Performed by: FAMILY MEDICINE

## 2023-03-29 PROCEDURE — 99214 PR OFFICE/OUTPT VISIT, EST, LEVL IV, 30-39 MIN: ICD-10-PCS | Mod: S$PBB,,, | Performed by: FAMILY MEDICINE

## 2023-03-29 PROCEDURE — 99999 PR PBB SHADOW E&M-EST. PATIENT-LVL IV: ICD-10-PCS | Mod: PBBFAC,,, | Performed by: FAMILY MEDICINE

## 2023-03-29 PROCEDURE — 99999 PR PBB SHADOW E&M-EST. PATIENT-LVL IV: CPT | Mod: PBBFAC,,, | Performed by: FAMILY MEDICINE

## 2023-03-29 RX ORDER — VALACYCLOVIR HYDROCHLORIDE 1 G/1
TABLET, FILM COATED ORAL
Qty: 60 TABLET | Refills: 11 | Status: SHIPPED | OUTPATIENT
Start: 2023-03-29

## 2023-03-29 RX ORDER — FLASH GLUCOSE SENSOR
2 KIT MISCELLANEOUS
Qty: 2 KIT | Refills: 11 | Status: SHIPPED | OUTPATIENT
Start: 2023-03-29 | End: 2023-11-27 | Stop reason: SDUPTHER

## 2023-03-29 RX ORDER — OSELTAMIVIR PHOSPHATE 75 MG/1
75 CAPSULE ORAL 2 TIMES DAILY
COMMUNITY
Start: 2022-10-31 | End: 2023-03-29

## 2023-03-29 RX ORDER — METHYLPREDNISOLONE 4 MG/1
TABLET ORAL
COMMUNITY
Start: 2022-12-31 | End: 2023-03-29

## 2023-03-29 RX ORDER — ACYCLOVIR 50 MG/G
OINTMENT TOPICAL
Qty: 15 G | Refills: 11 | Status: SHIPPED | OUTPATIENT
Start: 2023-03-29

## 2023-03-29 RX ORDER — KETOROLAC TROMETHAMINE 10 MG/1
10 TABLET, FILM COATED ORAL
COMMUNITY
Start: 2022-12-31 | End: 2023-03-29

## 2023-03-29 RX ORDER — INSULIN GLARGINE 100 [IU]/ML
30 INJECTION, SOLUTION SUBCUTANEOUS DAILY
Qty: 27 ML | Refills: 3 | Status: SHIPPED | OUTPATIENT
Start: 2023-03-29 | End: 2023-11-27 | Stop reason: SDUPTHER

## 2023-03-30 ENCOUNTER — PATIENT MESSAGE (OUTPATIENT)
Dept: PRIMARY CARE CLINIC | Facility: CLINIC | Age: 68
End: 2023-03-30
Payer: MEDICARE

## 2023-03-30 NOTE — TELEPHONE ENCOUNTER
Refaxed referral to Dr. Mcelroy and schedule pt for 5/3/23 at 10:40 AM with Dr. Mcelroy. Informed pt to bring all meds with him to the appt. Pt verbalized understanding.

## 2023-03-30 NOTE — TELEPHONE ENCOUNTER
Referral should have been faxed yesterday to Dr. Stephon Mcelroy's office in [place], [place]. please call the office to make sure they got it patient attempted to make an appointment.

## 2023-06-01 ENCOUNTER — PATIENT MESSAGE (OUTPATIENT)
Dept: PRIMARY CARE CLINIC | Facility: CLINIC | Age: 68
End: 2023-06-01
Payer: MEDICARE

## 2023-06-01 DIAGNOSIS — E11.65 TYPE 2 DIABETES MELLITUS WITH HYPERGLYCEMIA, WITH LONG-TERM CURRENT USE OF INSULIN: ICD-10-CM

## 2023-06-01 DIAGNOSIS — Z79.4 TYPE 2 DIABETES MELLITUS WITH HYPERGLYCEMIA, WITH LONG-TERM CURRENT USE OF INSULIN: ICD-10-CM

## 2023-06-01 DIAGNOSIS — E11.52 TYPE 2 DIABETES MELLITUS WITH DIABETIC PERIPHERAL ANGIOPATHY AND GANGRENE, WITHOUT LONG-TERM CURRENT USE OF INSULIN: ICD-10-CM

## 2023-06-01 DIAGNOSIS — Z79.4 TYPE 2 DIABETES MELLITUS WITH FOOT ULCER, WITH LONG-TERM CURRENT USE OF INSULIN: ICD-10-CM

## 2023-06-01 DIAGNOSIS — L97.509 TYPE 2 DIABETES MELLITUS WITH FOOT ULCER, WITH LONG-TERM CURRENT USE OF INSULIN: ICD-10-CM

## 2023-06-01 DIAGNOSIS — E11.621 TYPE 2 DIABETES MELLITUS WITH FOOT ULCER, WITH LONG-TERM CURRENT USE OF INSULIN: ICD-10-CM

## 2023-06-01 RX ORDER — SEMAGLUTIDE 1.34 MG/ML
1 INJECTION, SOLUTION SUBCUTANEOUS
Qty: 9 ML | Refills: 1 | Status: SHIPPED | OUTPATIENT
Start: 2023-06-01 | End: 2023-11-21

## 2023-06-01 NOTE — TELEPHONE ENCOUNTER
No care due was identified.  [facility] Embedded Care Due Messages. Reference number: [id number].   6/01/2023 9:38:28 AM CDT

## 2023-06-01 NOTE — TELEPHONE ENCOUNTER
No care due was identified.  [facility] Embedded Care Due Messages. Reference number: [id number].   6/01/2023 10:50:18 AM CDT

## 2023-06-01 NOTE — TELEPHONE ENCOUNTER
Refill Encounter    PCP Visits: Recent Visits  Date Type Provider Dept   03/29/23 Office Visit Idalia Alexandra, DO [facility]   12/14/22 Office Visit Idalia Alexandra, DO [facility]   06/14/22 Office Visit Idalia Alexandra, DO [facility]   Showing recent visits within past 360 days and meeting all other requirements  Future Appointments  Date Type Provider Dept   07/05/23 Appointment Idalia Alexandra, DO [facility]   Showing future appointments within next 720 days and meeting all other requirements     Last 3 Blood Pressure:   BP Readings from Last 3 Encounters:   03/29/23 122/78   12/23/22 (!) 171/93   09/16/22 122/70     Preferred Pharmacy:   [id number] - [place] - [address]  [id number]  [place] [id number]  Phone: [phone] Fax: [phone]    [id number] - [place] - [address] AT [facility]  [address]  [place] [address]  Phone: [phone] Fax: [phone]    [facility] [place] - [facility] AT [facility]  [place] [address]  Phone: [phone] Fax: [phone]    Requested RX:  Requested Prescriptions     Pending Prescriptions Disp Refills    semaglutide (OZEMPIC) 1 mg/dose (4 mg/3 mL) 3 each 1     Sig: Inject 1 mg into the skin every 7 days.      RX Route: Normal

## 2023-06-01 NOTE — TELEPHONE ENCOUNTER
Refill Routing Note   Medication(s) are not appropriate for processing by [facility] for the following reason(s):      No active prescription written by PCP    [facility] action(s):  Defer None identified          Appointments  past 12m or future 3m with PCP    Date Provider   Last Visit   3/29/2023 Idalia Alexandra, DO   Next Visit   7/5/2023 Idalia Alexandra DO   ED visits in past 90 days: 0        Note composed:11:32 AM 06/01/2023

## 2023-07-03 PROBLEM — N28.9 ACUTE RENAL INSUFFICIENCY: Status: RESOLVED | Noted: 2023-03-28 | Resolved: 2023-07-03

## 2023-10-04 ENCOUNTER — TELEPHONE (OUTPATIENT)
Dept: PRIMARY CARE CLINIC | Facility: CLINIC | Age: 68
End: 2023-10-04
Payer: MEDICARE

## 2023-10-04 DIAGNOSIS — N52.9 ERECTILE DYSFUNCTION, UNSPECIFIED ERECTILE DYSFUNCTION TYPE: Primary | ICD-10-CM

## 2023-10-04 NOTE — TELEPHONE ENCOUNTER
Pt would like to know if there is a program inside or outside of [facility] for ED. Pt states that he does not want the implant and would like a call back with a solution.

## 2023-10-04 NOTE — TELEPHONE ENCOUNTER
----- Message from Paige Lainez sent at 10/3/2023 10:41 AM CDT -----  Type:  Patient Returning Call    Who Called:pt   Who Left Message for Patient:  Does the patient know what this is regarding?:  Would the patient rather a call back or a response via [facility]? Call   Best Call Back Number:[phone]  Additional Information: pt states that he would like to know if there is a program for erectile dysfunction and would like to know more information if there is

## 2023-10-04 NOTE — TELEPHONE ENCOUNTER
There is not a specific program here or in the city that I am aware of however our Urologist manage ED so I placed a referral for him.     There is also a Urologist at [facility] that specializes in ED named Dr. THERESA DE LA GARZA MD, FACS   [phone]    Dr. Idalia Alexandra D.O.   [facility]

## 2023-10-09 ENCOUNTER — TELEPHONE (OUTPATIENT)
Dept: INTERNAL MEDICINE | Facility: CLINIC | Age: 68
End: 2023-10-09
Payer: MEDICARE

## 2023-10-09 ENCOUNTER — TELEPHONE (OUTPATIENT)
Dept: PRIMARY CARE CLINIC | Facility: CLINIC | Age: 68
End: 2023-10-09
Payer: MEDICARE

## 2023-10-09 NOTE — TELEPHONE ENCOUNTER
----- Message from Heena South sent at 10/9/2023  2:33 PM CDT -----  Name of Who is Calling:RICK SHUKLA [[id number]]           What is the request in detail:pt stated that he would like to speak with the office directly in regards to his questions/concerns, PT DID NOT GIVE INFO ON WHAT IT WAS IN REGARDS TO .Please contact to further discuss and advise.           Can the clinic reply by [facility]: NO           What Number to Call Back if not in [facility]:[phone]

## 2023-10-09 NOTE — TELEPHONE ENCOUNTER
----- Message from Marisa Childs sent at 10/9/2023  4:09 PM CDT -----  Type: Patient Call Back    Who called:pt     What is the request in detail:pt calling to discuss urology referral but needs to go to [facility]. Call pt     Can the clinic reply by [facility]?    Would the patient rather a call back or a response via [facility]? call    Best call back number:[phone] (home)       Additional Information:

## 2023-10-09 NOTE — TELEPHONE ENCOUNTER
Spoke with pt and gave Dr. Alexandra's urology MD recs below:    There is also a Urologist at [facility] that specializes in ED named Dr. THERESA DE LA GARZA MD, FACS   [phone]

## 2023-10-09 NOTE — TELEPHONE ENCOUNTER
----- Message from Heena South sent at 10/9/2023  2:33 PM CDT -----  Name of Who is Calling:RICK SHUKLA [[id number]]           What is the request in detail:pt stated that he would like to speak with the office directly in regards to his questions/concerns, PT DID NOT GIVE INFO ON WHAT IT WAS IN REGARDS TO .Please contact to further discuss and advise.           Can the clinic reply by [facility]: NO           What Number to Call Back if not in [facility]:[phone]

## 2023-10-09 NOTE — TELEPHONE ENCOUNTER
Attempted to contact the patient and the call was forwarded, but I was unable to leave a message.

## 2023-10-10 ENCOUNTER — TELEPHONE (OUTPATIENT)
Dept: INTERNAL MEDICINE | Facility: CLINIC | Age: 68
End: 2023-10-10
Payer: MEDICARE

## 2023-10-10 NOTE — TELEPHONE ENCOUNTER
----- Message from Marisa Childs sent at 10/9/2023  4:09 PM CDT -----  Type: Patient Call Back    Who called:pt     What is the request in detail:pt calling to discuss urology referral but needs to go to [facility]. Call pt     Can the clinic reply by [facility]?    Would the patient rather a call back or a response via [facility]? call    Best call back number:[phone] (home)       Additional Information:

## 2023-10-10 NOTE — TELEPHONE ENCOUNTER
"Called urologist office and spoke Miss Tian for fax number to fax urology referral per message below:    There is also a Urologist at [facility] that specializes in ED named Dr. THERESA DE LA GARZA MD, FACS   [phone]      Fax# [phone]      Your fax has been successfully sent to [phone] at [phone].  ------------------------------------------------------------  From: [id number]  ------------------------------------------------------------  10/10/2023 8:10:27 AM Transmission Record          Sent to [phone] with remote ID "[id number]"          Result: (0/339;0/0) Success          Page record: 1 - 3          Elapsed time: 02:09 on channel 17      Pt wife informed urology referral faxed.  "

## 2023-10-11 ENCOUNTER — TELEPHONE (OUTPATIENT)
Dept: INTERNAL MEDICINE | Facility: CLINIC | Age: 68
End: 2023-10-11
Payer: MEDICARE

## 2023-10-11 NOTE — TELEPHONE ENCOUNTER
"Spoke with Mr. Gonzalez and he said that he was told Dr. THERESA DE LA GARZA MD, FACS office never received pt urology fax but I informed pt I told his wife on yesterday that the referral was successfully sent per message below:    Sultana HOBSON    10/10/23  8:04 AM  Note  Called urologist office and spoke Miss Tian for fax number to fax urology referral per message below:     There is also a Urologist at [facility] that specializes in ED named Dr. THERESA DE LA GARZA MD, FACS   [phone]        Fax# [phone]        Your fax has been successfully sent to [phone] at [phone].  ------------------------------------------------------------  From: [id number]  ------------------------------------------------------------  10/10/2023 8:10:27 AM Transmission Record          Sent to [phone] with remote ID "[id number]"          Result: (0/339;0/0) Success          Page record: 1 - 3          Elapsed time: 02:09 on channel 17        Pt wife informed urology referral faxed.        Pt asked that I send to another fax# [phone]  "

## 2023-10-11 NOTE — TELEPHONE ENCOUNTER
"m stating urology referral faxed to   Dr. THERESA DE LA GARZA MD, FACS     Your fax has been successfully sent to [phone] at [phone].  ------------------------------------------------------------  From: [id number]  ------------------------------------------------------------  10/11/2023 4:16:42 PM Transmission Record          Sent to [phone] with remote ID "[id number]"          Result: (0/339;0/0) Success          Page record: 1 - 3          Elapsed time: 01:13 on channel 16  "

## 2023-10-11 NOTE — TELEPHONE ENCOUNTER
----- Message from Yenny Ly sent at 10/11/2023  3:46 PM CDT -----  Name of Who is Calling:RICK SHUKLA [[id number]]                What is the request in detail: Pt calling because he states that he has been waiting for fax to be sent.Please call back to further assist.                  Can the clinic reply by [facility]: No                What [facility][phone]

## 2023-11-21 DIAGNOSIS — E11.65 TYPE 2 DIABETES MELLITUS WITH HYPERGLYCEMIA, WITH LONG-TERM CURRENT USE OF INSULIN: ICD-10-CM

## 2023-11-21 DIAGNOSIS — E11.621 TYPE 2 DIABETES MELLITUS WITH FOOT ULCER, WITH LONG-TERM CURRENT USE OF INSULIN: ICD-10-CM

## 2023-11-21 DIAGNOSIS — Z79.4 TYPE 2 DIABETES MELLITUS WITH HYPERGLYCEMIA, WITH LONG-TERM CURRENT USE OF INSULIN: ICD-10-CM

## 2023-11-21 DIAGNOSIS — E11.52 TYPE 2 DIABETES MELLITUS WITH DIABETIC PERIPHERAL ANGIOPATHY AND GANGRENE, WITHOUT LONG-TERM CURRENT USE OF INSULIN: ICD-10-CM

## 2023-11-21 DIAGNOSIS — Z79.4 TYPE 2 DIABETES MELLITUS WITH FOOT ULCER, WITH LONG-TERM CURRENT USE OF INSULIN: ICD-10-CM

## 2023-11-21 DIAGNOSIS — L97.509 TYPE 2 DIABETES MELLITUS WITH FOOT ULCER, WITH LONG-TERM CURRENT USE OF INSULIN: ICD-10-CM

## 2023-11-21 RX ORDER — SEMAGLUTIDE 1.34 MG/ML
1 INJECTION, SOLUTION SUBCUTANEOUS
Qty: 9 ML | Refills: 1 | OUTPATIENT
Start: 2023-11-21 | End: 2024-05-19

## 2023-11-21 NOTE — TELEPHONE ENCOUNTER
Care Due:                  Date            Visit Type   Department     Provider  --------------------------------------------------------------------------------                                EP -                              PRIMARY      [facility] PRIMARY  Last Visit: 03-      CARE ([facility])   CARE           Idalia Alexandra  Next Visit: None Scheduled  None         None Found                                                            Last  Test          Frequency    Reason                     Performed    Due Date  --------------------------------------------------------------------------------    Lipid Panel.  12 months..  rosuvastatin.............  12- 12-    [facility] Embedded Care Due Messages. Reference number: [id number].   11/21/2023 4:57:42 PM CST

## 2023-11-21 NOTE — TELEPHONE ENCOUNTER
Please notify pt. The patient's last visit with me was on 3/29/2023. I saw that his diabetes is much worse and he needs to be seen to discuss. I increased his semaglutide 1 mg to 2 mg daily. Okay for virtual. He needs to seen    Orders Placed This Encounter    semaglutide (OZEMPIC) 2 mg/dose (8 mg/3 mL) PnIj       Dr. Idalia Alexandra D.O.   Family Medicine

## 2023-11-21 NOTE — TELEPHONE ENCOUNTER
Refill Encounter    PCP Visits: Recent Visits  Date Type Provider Dept   03/29/23 Office Visit Idalia Alexandra, DO [facility]   12/14/22 Office Visit Idlaia Alexandra, DO [facility]   Showing recent visits within past 360 days and meeting all other requirements  Future Appointments  No visits were found meeting these conditions.  Showing future appointments within next 720 days and meeting all other requirements     Last 3 Blood Pressure:   BP Readings from Last 3 Encounters:   03/29/23 122/78   12/23/22 (!) 171/93   09/16/22 122/70     Preferred Pharmacy:   [id number] - [place] - [address]  [address]  [place] [address]  Phone: [phone] Fax: [phone]    [facility]pharmacy #[id number] - [place] - [address] AT [address] [facility]  [address] [place] [address]  Phone: [phone] Fax: [phone]    [facility] - [place] - [facility] AT [facility] to [facility]  [place] [address]  Phone: [phone] Fax: [phone]    Requested RX:  Requested Prescriptions     Pending Prescriptions Disp Refills    semaglutide (OZEMPIC) 1 mg/dose (4 mg/3 mL) 9 mL 1     Sig: Inject 1 mg into the skin every 7 days.      RX Route: Normal

## 2023-11-21 NOTE — TELEPHONE ENCOUNTER
----- Message from Lise Max sent at 11/21/2023  3:41 PM CST -----  Regarding: Refill Request  Who Called: RICK SHUKLA [[id number]]          New Prescription or Refill : Refill      RX Name and Strength:  semaglutide (OZEMPIC) 1 mg/dose (4 mg/3 mL)      30 day or 90 day RX: 30      Preferred Pharmacy: [facility] [address] [facility] [address]   Phone: [phone]  Fax: [phone]            Would the patient rather a call back or a response via [facility]? Call back        Best Call Back Number:   [phone]        Additional Information: Patient also needs to schedule an appt before procedure that is scheduled 12/18.

## 2023-11-24 ENCOUNTER — TELEPHONE (OUTPATIENT)
Dept: PRIMARY CARE CLINIC | Facility: CLINIC | Age: 68
End: 2023-11-24
Payer: MEDICARE

## 2023-11-24 NOTE — TELEPHONE ENCOUNTER
----- Message from Shannon Cole sent at 11/24/2023 10:24 AM CST -----  Regarding: testosterone inj  Name of Who is Calling:RICK SHUKLA [[id number]]          What is the request in detail:  Pt would like to get a testosterone injection. He is asking if he can get it done at pharmacy. Please call to assist        Can the clinic reply by [facility]:          What Number to Call Back if not in [facility]: [phone]

## 2023-11-24 NOTE — TELEPHONE ENCOUNTER
----- Message from Shannon Cole sent at 11/24/2023 10:21 AM CST -----  Regarding: refill  Who Called:RICK SHUKLA [[id number]]      Refill or New Rx: Refill        RX Name and Strength  Clindamycin gel 1%    Is this a 30 day or 90 day RX:      Preferred Pharmacy with phone number:  [facility]   Phone: [phone]  Fax: [phone]            Local or Mail Order:local       Would the patient rather a call back or a response via [facility]?      best Call Back Number: [phone]      Additional Information:

## 2023-11-27 ENCOUNTER — TELEPHONE (OUTPATIENT)
Dept: PRIMARY CARE CLINIC | Facility: CLINIC | Age: 68
End: 2023-11-27

## 2023-11-27 ENCOUNTER — OFFICE VISIT (OUTPATIENT)
Dept: PRIMARY CARE CLINIC | Facility: CLINIC | Age: 68
End: 2023-11-27
Attending: FAMILY MEDICINE
Payer: MEDICARE

## 2023-11-27 DIAGNOSIS — D64.9 NORMOCYTIC ANEMIA: ICD-10-CM

## 2023-11-27 DIAGNOSIS — E11.65 TYPE 2 DIABETES MELLITUS WITH HYPERGLYCEMIA, WITH LONG-TERM CURRENT USE OF INSULIN: Primary | ICD-10-CM

## 2023-11-27 DIAGNOSIS — I70.263 ATHEROSCLEROSIS OF NATIVE ARTERY OF BOTH LOWER EXTREMITIES WITH GANGRENE: ICD-10-CM

## 2023-11-27 DIAGNOSIS — E11.52 TYPE 2 DIABETES MELLITUS WITH DIABETIC PERIPHERAL ANGIOPATHY AND GANGRENE, WITHOUT LONG-TERM CURRENT USE OF INSULIN: ICD-10-CM

## 2023-11-27 DIAGNOSIS — E78.5 HYPERLIPIDEMIA ASSOCIATED WITH TYPE 2 DIABETES MELLITUS: ICD-10-CM

## 2023-11-27 DIAGNOSIS — E11.59 HYPERTENSION ASSOCIATED WITH TYPE 2 DIABETES MELLITUS: ICD-10-CM

## 2023-11-27 DIAGNOSIS — Z79.4 TYPE 2 DIABETES MELLITUS WITH HYPERGLYCEMIA, WITH LONG-TERM CURRENT USE OF INSULIN: Primary | ICD-10-CM

## 2023-11-27 DIAGNOSIS — E11.69 HYPERLIPIDEMIA ASSOCIATED WITH TYPE 2 DIABETES MELLITUS: ICD-10-CM

## 2023-11-27 DIAGNOSIS — I15.2 HYPERTENSION ASSOCIATED WITH TYPE 2 DIABETES MELLITUS: ICD-10-CM

## 2023-11-27 PROBLEM — E11.621 TYPE 2 DIABETES MELLITUS WITH FOOT ULCER, WITH LONG-TERM CURRENT USE OF INSULIN: Status: RESOLVED | Noted: 2021-11-09 | Resolved: 2023-11-27

## 2023-11-27 PROBLEM — L97.509 TYPE 2 DIABETES MELLITUS WITH FOOT ULCER, WITH LONG-TERM CURRENT USE OF INSULIN: Status: RESOLVED | Noted: 2021-11-09 | Resolved: 2023-11-27

## 2023-11-27 PROBLEM — R07.9 CHEST PAIN: Status: RESOLVED | Noted: 2023-03-29 | Resolved: 2023-11-27

## 2023-11-27 PROCEDURE — 99214 OFFICE O/P EST MOD 30 MIN: CPT | Mod: 95,,, | Performed by: FAMILY MEDICINE

## 2023-11-27 PROCEDURE — 99214 PR OFFICE/OUTPT VISIT, EST, LEVL IV, 30-39 MIN: ICD-10-PCS | Mod: 95,,, | Performed by: FAMILY MEDICINE

## 2023-11-27 RX ORDER — LOSARTAN POTASSIUM AND HYDROCHLOROTHIAZIDE 25; 100 MG/1; MG/1
1 TABLET ORAL DAILY
Qty: 90 TABLET | Refills: 3 | Status: SHIPPED | OUTPATIENT
Start: 2023-11-27 | End: 2024-11-26

## 2023-11-27 RX ORDER — INSULIN GLARGINE 100 [IU]/ML
35 INJECTION, SOLUTION SUBCUTANEOUS DAILY
Qty: 30 ML | Refills: 3 | Status: SHIPPED | OUTPATIENT
Start: 2023-11-27 | End: 2024-11-26

## 2023-11-27 RX ORDER — FLASH GLUCOSE SENSOR
2 KIT MISCELLANEOUS
Qty: 2 KIT | Refills: 11 | Status: SHIPPED | OUTPATIENT
Start: 2023-11-27 | End: 2023-12-19 | Stop reason: SDUPTHER

## 2023-11-27 RX ORDER — TIRZEPATIDE 5 MG/.5ML
5 INJECTION, SOLUTION SUBCUTANEOUS
Qty: 4 PEN | Refills: 2 | Status: CANCELLED | OUTPATIENT
Start: 2023-11-27 | End: 2024-02-25

## 2023-11-27 NOTE — Clinical Note
Please schedule: 1. 3 month virtual diabetes follow-up with labs prior to visit at [facility]

## 2023-11-27 NOTE — TELEPHONE ENCOUNTER
----- Message from Idalia Alexandra DO sent at 11/27/2023  1:32 PM CST -----  Please schedule:  1. 3 month virtual diabetes follow-up with labs prior to visit at [facility]

## 2023-11-27 NOTE — PROGRESS NOTES
"FAMILY MEDICINE  [facility]    Reason for visit:   Chief Complaint   Patient presents with    Diabetes         SUBJECTIVE: Sj Gonzalez is a 68 y.o. male  - obesity, hypertension, hyperlipidemia, type 2 diabetes, lumbar degenerative disc disease, and peripheral arterial disease presents for diabetes follow-up     Cardiology: Dr. Jeovany Hart   Endocrinology Dr. Guzman  Podiatry Dr. Zepeda  Gastroenterology: Dr. Hodges  Urology Dr. Reno Riddle    The patient's last visit with me was on 3/29/2023. He has been following with Endocrinology for his diabetes but reports that he was doing well and has not seen endocrinology in over 6 months.  He reports that he has been unable to afford his semaglutide since he reached the "donut hole" for his Medicare.  He is also unsure of his freestyle Jaelyn or insulin will be covered since he has not picked them up yet for this month.  He has noticed that his weight as well as glucose levels have significantly increased without semaglutide.    1. Diabetes Type 2     Endocrinology Dr. Guzman     Current Outpatient Medications on File Prior to Visit:  insulin (BASAGLAR KWIKPEN U-100 INSULIN) glargine 100 units/mL SubQ pen, Inject 30 Units into the skin once daily., Disp: 27 mL, Rfl: 3  semaglutide (OZEMPIC) 2 mg/dose (8 mg/3 mL) PnIj, Inject 2 mg into the skin every 7 days., Disp: 9 mL, Rfl: 0  - not taking x 2 months 2/2 Medicare "doughnut hole"    Side effects from treatment:  denies     Prior medication:   Metformin (stomach cramps)  Trulicity (too expensive)   Glipizide (discontinue started Trulicity with Basaglar)  Pioglitazone (cost)    Complications of diabetes: history of diabetic foot ulcer healed; PAD     Glucometer: FreeStyle Jaelyn   Glucose monitoring: premeal and bedtime    Hemoglobin A1C       Date                     Value               Ref Range           Status                11/20/2023               8.5 (H)             4.0 - 5.6 %         " Final              Comment:    ADA Screening Guidelines:  5.7-6.4%  Consistent with prediabetes  >or=6.5%  Consistent with diabetes    High levels of fetal hemoglobin interfere with the HbA1C  assay. Heterozygous hemoglobin variants (HbS, HgC, etc)do  not significantly interfere with this assay.   However, presence of multiple variants may affect accuracy.         06/28/2023               6.5 (H)             4.0 - 5.6 %         Final              Comment:    ADA Screening Guidelines:  5.7-6.4%  Consistent with prediabetes  >or=6.5%  Consistent with diabetes    High levels of fetal hemoglobin interfere with the HbA1C  assay. Heterozygous hemoglobin variants (HbS, HgC, etc)do  not significantly interfere with this assay.   However, presence of multiple variants may affect accuracy.         03/21/2023               7.0 (H)             4.0 - 5.6 %         Final              Comment:    ADA Screening Guidelines:  5.7-6.4%  Consistent with prediabetes  >or=6.5%  Consistent with diabetes    High levels of fetal hemoglobin interfere with the HbA1C  assay. Heterozygous hemoglobin variants (HbS, HgC, etc)do  not significantly interfere with this assay.   However, presence of multiple variants may affect accuracy.    ----------    Low dose statin: Rosuvastatin   Last eye exam:6/8/22. Referral placed 11/27/23  Last foot exam: 3/29/2023     Vaccines:   Influenza:  Due each flu season and pt declined  Prevnar 20: recommended and he declines  Covid-19 recommended and he declines         Diabetes        Review of Systems   All other systems reviewed and are negative.      HEALTH MAINTENANCE:   Health Maintenance   Topic Date Due    Shingles Vaccine (1 of 2) Never done    Eye Exam  06/08/2023    Lipid Panel  12/12/2023    Hemoglobin A1c  02/20/2024    High Dose Statin  03/29/2024    Aspirin/Antiplatelet Therapy  03/29/2024    PROSTATE-SPECIFIC ANTIGEN  10/31/2024    Foot Exam  11/27/2024    Colorectal Cancer Screening  09/25/2030     TETANUS VACCINE  06/07/2032    Hepatitis C Screening  Completed     Health Maintenance Topics with due status: Not Due       Topic Last Completion Date    Colorectal Cancer Screening 09/25/2020    TETANUS VACCINE 06/07/2022    Diabetes Urine Screening 03/21/2023    High Dose Statin 03/29/2023    Aspirin/Antiplatelet Therapy 03/29/2023    PROSTATE-SPECIFIC ANTIGEN 10/31/2023    Hemoglobin A1c 11/20/2023    Foot Exam 11/27/2023     Health Maintenance Due   Topic Date Due    COVID-19 Vaccine (1) Never done    Pneumococcal Vaccines (Age 65+) (1 - PCV) Never done    Shingles Vaccine (1 of 2) Never done    RSV Vaccine (Age 60+ and Pregnant patients) (1 - 1-dose 60+ series) Never done    Eye Exam  06/08/2023    Influenza Vaccine (1) 09/01/2023    Lipid Panel  12/12/2023       HISTORY:   Past Medical History:   Diagnosis Date    COVID-19 04/12/2022    - COVID-19 positive - counseling on management COVID-19 - COVID risk score equals 5 - Recommend Paxlovid    Diabetes mellitus     Epigastric pain 09/07/2022    Generalized abdominal pain 03/16/2022    - unknown etiology - reviewed x-ray, abdominal ultrasound and colonoscopy - recommend CT scan abdomen pelvis - recommend re-evaluation by gastroenterology    Heart murmur     MVP    Hypertension     Non-recurrent unilateral inguinal hernia without obstruction or gangrene 09/30/2021    Type 2 diabetes mellitus with foot ulcer, with long-term current use of insulin 11/09/2021    Lab Results  Component  Value  Date     HGBA1C  8.5 (H)  11/20/2023     - +PAD  - ulcer resolved    Urinary tract infection        Past Surgical History:   Procedure Laterality Date    AORTOGRAPHY WITH SERIALOGRAPHY N/A 11/04/2021    Procedure: AORTOGRAM, WITH SERIALOGRAPHY;  Surgeon: Jeovany Hart III, MD;  Location: [facility] CATH LAB;  Service: Cardiology;  Laterality: N/A;    ARTHROSCOPY OF KNEE Right     COLONOSCOPY N/A 09/25/2020    Procedure: COLONOSCOPY;  Surgeon: Vicky Hodges MD;  Location:  [facility] ENDO;  Service: Endoscopy;  Laterality: N/A;    ESOPHAGOGASTRODUODENOSCOPY N/A 09/16/2022    Procedure: EGD (ESOPHAGOGASTRODUODENOSCOPY);  Surgeon: Vicky Hodges MD;  Location: [facility];  Service: Endoscopy;  Laterality: N/A;    HERNIA REPAIR      ROBOT-ASSISTED LAPAROSCOPIC REPAIR OF INGUINAL HERNIA Right 09/30/2021    Procedure: ROBOTIC REPAIR, HERNIA, INGUINAL;  Surgeon: Carmelo Farooq MD;  Location: [facility] OR;  Service: General;  Laterality: Right;    SPINE SURGERY      Lumbar       Family History   Problem Relation Age of Onset    No Known Problems Mother     No Known Problems Father     Diabetes Sister     Diabetes Brother     No Known Problems Daughter     Cancer Neg Hx     Heart disease Neg Hx     Prostate cancer Neg Hx     Kidney disease Neg Hx        Social History     Tobacco Use    Smoking status: Never     Passive exposure: Never    Smokeless tobacco: Never   Substance Use Topics    Alcohol use: Yes     Comment: daily    Drug use: No       Social History     Social History Narrative    He lives in [facility]. He lives with his wife and 2 children. His children are attending [employer]. He lived [place]. . Nonsmoker. Social drinker. 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 His son attended [employer].        ALLERGIES:   Review of patient's allergies indicates:   Allergen Reactions    Metformin      GI intolerance    Lisinopril        MEDS:   Outpatient Medications as of 11/27/2023   Medication Sig Dispense Refill    aspirin 81 MG Chew Take 1 tablet (81 mg total) by mouth once daily. 30 tablet 1    acyclovir 5% (ZOVIRAX) 5 % ointment Apply topically 5 (five) times daily. 15 g 11    amLODIPine (NORVASC) 10 MG tablet Take 1 tablet (10 mg total) by mouth once daily. 90 tablet 3    blood sugar diagnostic Strp Test blood sugar 3 (three) times daily before meals. 100 strip 11    flash glucose scanning reader ([employer] MAKI 14 DAY READER) Misc use once daily 1 each 0    insulin  "(BASAGLAR KWIKPEN U-100 INSULIN) glargine 100 units/mL SubQ pen Inject 35 Units into the skin once daily. 31.5 mL 3    losartan-hydrochlorothiazide 100-25 mg (HYZAAR) 100-25 mg per tablet Take 1 tablet by mouth once daily. 90 tablet 3    omeprazole (PRILOSEC) 40 MG capsule Take 1 capsule (40 mg total) by mouth once daily. 30 capsule 5    pen needle, diabetic 32 gauge x 5/32" Ndle Use nightly as directed with insulin pen. 100 each 5    rosuvastatin (CRESTOR) 20 MG tablet Take 1 tablet (20 mg total) by mouth once daily. 90 tablet 3    valACYclovir (VALTREX) 1000 MG tablet 1 tab po BID x 5 days prn cold sore 60 tablet 11     No current facility-administered medications on file as of 11/27/2023.       Vital signs:   There were no vitals filed for this visit.    There is no height or weight on file to calculate BMI.    PHYSICAL EXAM:     Physical Exam  Constitutional:       General: He is not in acute distress.  Pulmonary:      Effort: Pulmonary effort is normal. No respiratory distress.   Neurological:      Mental Status: He is alert.   Psychiatric:         Speech: Speech normal.             PERTINENT RESULTS:   No visits with results within 1 Week(s) from this visit.   Latest known visit with results is:   Lab Visit on 11/20/2023   Component Date Value Ref Range Status    Urine Culture, Routine 11/20/2023 No significant growth   Final    Hemoglobin A1C 11/20/2023 8.5 (H)  4.0 - 5.6 % Final    Comment: ADA Screening Guidelines:  5.7-6.4%  Consistent with prediabetes  >or=6.5%  Consistent with diabetes    High levels of fetal hemoglobin interfere with the HbA1C  assay. Heterozygous hemoglobin variants (HbS, HgC, etc)do  not significantly interfere with this assay.   However, presence of multiple variants may affect accuracy.      Estimated Avg Glucose 11/20/2023 197 (H)  68 - 131 mg/dL Final    Testosterone, Total 11/20/2023 231 (L)  304 - 1227 ng/dL Final       ASSESSMENT/PLAN:    1. Type 2 diabetes mellitus with " hyperglycemia, with long-term current use of insulin  Overview:  Lab Results   Component Value Date    HGBA1C 8.5 (H) 11/20/2023     - A1c worsening because he is unable to afford his semaglutide  - recommend increase Basaglar 30 units daily to 35 units daily pending ability to get his semaglutide  - referral for pharmacy assistance    Orders:  -     Ambulatory referral/consult to Optometry; Future; Expected date: 12/04/2023  -     Lipid Panel; Future; Expected date: 02/27/2024  -     Hemoglobin A1C; Future; Expected date: 02/27/2024  -     Microalbumin/Creatinine Ratio, Urine; Future; Expected date: 02/27/2024  -     Comprehensive Metabolic Panel; Future; Expected date: 02/27/2024  -     Ambulatory referral/consult to Pharmacy Assistance; Future; Expected date: 12/04/2023  -     semaglutide (OZEMPIC) 2 mg/dose (8 mg/3 mL) PnIj; Inject 2 mg into the skin every 7 days.  Dispense: 9 mL; Refill: 1    2. Type 2 diabetes mellitus with diabetic peripheral angiopathy and gangrene, without long-term current use of insulin  Overview:    Lab Results   Component Value Date    HGBA1C 8.5 (H) 11/20/2023       Orders:  -     Ambulatory referral/consult to Optometry; Future; Expected date: 12/04/2023  -     Lipid Panel; Future; Expected date: 02/27/2024  -     Hemoglobin A1C; Future; Expected date: 02/27/2024  -     Microalbumin/Creatinine Ratio, Urine; Future; Expected date: 02/27/2024  -     Comprehensive Metabolic Panel; Future; Expected date: 02/27/2024  -     insulin (BASAGLAR KWIKPEN U-100 INSULIN) glargine 100 units/mL SubQ pen; Inject 35 Units into the skin once daily.  Dispense: 31.5 mL; Refill: 3  -     flash glucose sensor (FREESTYLE MAKI 14 DAY SENSOR) Kit; use and change every 14 days  Dispense: 2 kit; Refill: 11  -     Ambulatory referral/consult to Pharmacy Assistance; Future; Expected date: 12/04/2023  -     semaglutide (OZEMPIC) 2 mg/dose (8 mg/3 mL) PnIj; Inject 2 mg into the skin every 7 days.  Dispense: 9 mL;  Refill: 1    3. Normocytic anemia  Overview:  Lab Results   Component Value Date    WBC 7.45 03/21/2023    HGB 13.4 (L) 03/21/2023    HCT 37.7 (L) 03/21/2023    MCV 95 03/21/2023     03/21/2023     - stable      4. Hypertension associated with type 2 diabetes mellitus  Overview:  - well controlled  - continue current medications    Orders:  -     Lipid Panel; Future; Expected date: 02/27/2024  -     CBC Auto Differential; Future; Expected date: 02/27/2024  -     losartan-hydrochlorothiazide 100-25 mg (HYZAAR) 100-25 mg per tablet; Take 1 tablet by mouth once daily.  Dispense: 90 tablet; Refill: 3    5. Hyperlipidemia associated with type 2 diabetes mellitus  Overview:  Lab Results   Component Value Date    LDLCALC 83.8 12/12/2022     - LDL goal <70 near goal  - continue rosuvastatin 20 mg daily    Orders:  -     Lipid Panel; Future; Expected date: 02/27/2024    6. 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  No intervention  - goal LDL <70  - BP goal < 130/80  - ASA 81 mg daily  - A1C goal </=7%            ORDERS:   Orders Placed This Encounter    Lipid Panel    Hemoglobin A1C    Microalbumin/Creatinine Ratio, Urine    Comprehensive Metabolic Panel    CBC Auto Differential    Ambulatory referral/consult to Optometry    Ambulatory referral/consult to Pharmacy Assistance    losartan-hydrochlorothiazide 100-25 mg (HYZAAR) 100-25 mg per tablet    insulin (BASAGLAR KWIKPEN U-100 INSULIN) glargine 100 units/mL SubQ pen    flash glucose sensor (FREESTYLE MAKI 14 DAY SENSOR) Kit    semaglutide (OZEMPIC) 2 mg/dose (8 mg/3 mL) PnIj         Vaccines recommended: PCV 20, shingles, flu and covid-19 booster. He declines all vaccines    Follow-up in 3 months  with labs virtual or sooner if any concerns.      This note is dictated using the M*Modal Fluency Direct word recognition program. There are word recognition mistakes that are occasionally missed on review.    Dr. Idalia Alexandra D.O.   [facility]

## 2023-11-29 ENCOUNTER — TELEPHONE (OUTPATIENT)
Dept: OPTOMETRY | Facility: CLINIC | Age: 68
End: 2023-11-29
Payer: MEDICARE

## 2023-11-29 NOTE — TELEPHONE ENCOUNTER
LVM informing pt that I scheduled their appt in Jan    ----- Message from Kristin Almodovar OD sent at 11/28/2023  2:22 PM CST -----  Call to offer appt in JAN

## 2023-12-05 ENCOUNTER — TELEPHONE (OUTPATIENT)
Dept: PHARMACY | Facility: CLINIC | Age: 68
End: 2023-12-05
Payer: MEDICARE

## 2023-12-05 ENCOUNTER — PATIENT MESSAGE (OUTPATIENT)
Dept: PHARMACY | Facility: CLINIC | Age: 68
End: 2023-12-05
Payer: MEDICARE

## 2023-12-05 NOTE — LETTER
December 5, 2023    Sj Gonzalez  [address] [place] [address]             Dear Sj Gonzalez    To follow up on your request for assistance. The Pharmacy Patient Assistance Program needs more information from you before we can submit your Ozempic application to the [employer] Program. Please return the following documents to the Pharmacy Patient Assistance office (forms can be returned by mail, email or fax) asap:      Proof of household Income( such as social security statement, 1099 form, pension statement or 3 consecutive pay stubs  Copy of all Insurance cards( front and back)  Signed and dated HIPAA /Patient Information Forms              Whats Next:      Once I receive your documentation and authorization from your Provider, your application will be submitted to the [employer] Program for review. Please be advised it will take 2 to 4 weeks for your application to be processed so you may have to purchase a month's supply of medication from your pharmacy to hold you over during the waiting period. You will be notified of approval or denial by The Program(mail) or myself.       If you have any questions or concerns, please give me a call          Sincerely   Ladi Farrar    Pharmacy Patient Assistance  [address] [place] [address]  [id number] [place] [address]  Phone: [phone]  Fax: [phone]  Email: [email or web address]

## 2023-12-05 NOTE — TELEPHONE ENCOUNTER
I have reached out to Sj Gonzalez to inform him of the [employer] application process for Ozempic and whats required to apply.  Sj Gonzalez did not answer. I left a voicemail,sent a portal message and mailed a letter introducing him to the pharmacy patient assistance program. I will follow up in 5 business days.

## 2023-12-05 NOTE — TELEPHONE ENCOUNTER
----- Message from Sydnie Pradhan sent at 11/27/2023  2:54 PM CST -----  Regarding: FW: Order for RICK SHUKLA,     Thank you for submitting a referral to the Pharmacy Patient Assistance Team. We have received your referral for Rick Shukla. This patient case has been assigned to Ladi Farrar, who will review the case and contact the patient with assistance options. You may review progress in the patient's chart through Telephone Encounter notes from Pharmacy Patient Assistance.       Thank you,     Pharmacy Patient Assistance Team  ----- Message -----  From: Idalia Alexandra DO  Sent: 11/27/2023   1:31 PM CST  To: Pharmacy Patient Assistance Team  Subject: Order for RICK SHUKLA

## 2023-12-11 ENCOUNTER — TELEPHONE (OUTPATIENT)
Dept: PRIMARY CARE CLINIC | Facility: CLINIC | Age: 68
End: 2023-12-11
Payer: MEDICARE

## 2023-12-11 DIAGNOSIS — L98.9 SKIN LESION OF UPPER EXTREMITY: Primary | ICD-10-CM

## 2023-12-11 NOTE — TELEPHONE ENCOUNTER
----- Message from Rebecca Blanton sent at 12/11/2023 11:17 AM CST -----  Contact: RICK SHUKLA [[id number]]  Type:  Patient Requesting Referral    Who Called  RICK SHUKLA [[id number]]    Referral to What Specialty: Dermatology    Reason for Referral: Sores on arms and hand    Does the patient want the referral with a specific physician?: No    [facility]    Would the patient rather a call back or a response via [facility]?  Call back     Best Call Back Number:   [phone]    Additional Information:

## 2023-12-14 ENCOUNTER — PATIENT MESSAGE (OUTPATIENT)
Dept: PRIMARY CARE CLINIC | Facility: CLINIC | Age: 68
End: 2023-12-14
Payer: MEDICARE

## 2023-12-14 NOTE — TELEPHONE ENCOUNTER
Orders Placed This Encounter    Ambulatory referral/consult to Dermatology     Referral placed 12/11/23.    Please call Sj Gonzalez and see how is glucose if since I increased his Basaglar to 35 mg daily. Please ask him to check him MyChart and I will message him    Dr. Idalia Alexandra D.O.   Family Medicine

## 2023-12-19 ENCOUNTER — TELEPHONE (OUTPATIENT)
Dept: PRIMARY CARE CLINIC | Facility: CLINIC | Age: 68
End: 2023-12-19
Payer: MEDICARE

## 2023-12-19 DIAGNOSIS — E11.52 TYPE 2 DIABETES MELLITUS WITH DIABETIC PERIPHERAL ANGIOPATHY AND GANGRENE, WITHOUT LONG-TERM CURRENT USE OF INSULIN: ICD-10-CM

## 2023-12-19 RX ORDER — FLASH GLUCOSE SENSOR
2 KIT MISCELLANEOUS
Qty: 2 KIT | Refills: 11 | Status: SHIPPED | OUTPATIENT
Start: 2023-12-19

## 2023-12-19 NOTE — TELEPHONE ENCOUNTER
I sent a refill of his FreeStle Jaelyn to the [facility]. I need him to check his glucose so that I can get his diabetes under control.     I also placed a referral to Dermatology    I do not do testosterone therapy so am unable to prescribe testosterone.    Orders Placed This Encounter    Ambulatory referral/consult to Dermatology    flash glucose sensor (FREESTYLE JAELYN 14 DAY SENSOR) Kit          Dr. Idalia Alexandra D.O.   [facility]

## 2023-12-19 NOTE — TELEPHONE ENCOUNTER
Pt stated he believes his glucose has been great, he hasn't checked it in a while since he doesn't have the shakeel any more but he would like to get it again and he doesn't like the idea of pricking his self.     Pt would like a referral for dermatology placed due to sores on his arms that he believes is from diabetes.     Pt would also like to know if  can prescribe him with some Testerone vials and needles that way he can give his self the Testerone injection like he did in the past or can she send it to the pharmacy so he can have it done there since he didn't receive it at the urology appointment.

## 2023-12-20 ENCOUNTER — TELEPHONE (OUTPATIENT)
Dept: ORTHOPEDICS | Facility: CLINIC | Age: 68
End: 2023-12-20
Payer: MEDICARE

## 2023-12-26 ENCOUNTER — PATIENT MESSAGE (OUTPATIENT)
Dept: PHARMACY | Facility: CLINIC | Age: 68
End: 2023-12-26
Payer: MEDICARE

## 2023-12-27 NOTE — TELEPHONE ENCOUNTER
A 2nd attempt has been made to establish contact with Sj Gonzalez  via LETTER and portal message. The final contact attempt will be made in 5 business days

## 2024-01-02 ENCOUNTER — TELEPHONE (OUTPATIENT)
Dept: PRIMARY CARE CLINIC | Facility: CLINIC | Age: 69
End: 2024-01-02
Payer: MEDICARE

## 2024-01-02 NOTE — TELEPHONE ENCOUNTER
----- Message from Ibeth Clement sent at 1/2/2024  4:07 PM CST -----  Regarding: Appointment  .Type:  Sooner Apoointment Request    Caller is requesting a sooner appointment.     Caller declined first available appointment listed below.    Name of Caller:PT    When is the first available appointment?1/9/2024    Symptoms:Cyst under rt arm      Would the patient rather a call back or a response via [facility]? Call back       Best Call Back Number:[phone]        Additional Information:

## 2024-01-05 ENCOUNTER — OFFICE VISIT (OUTPATIENT)
Dept: INTERNAL MEDICINE | Facility: CLINIC | Age: 69
End: 2024-01-05
Payer: MEDICARE

## 2024-01-05 VITALS
HEIGHT: 72 IN | BODY MASS INDEX: 32.85 KG/M2 | SYSTOLIC BLOOD PRESSURE: 120 MMHG | HEART RATE: 78 BPM | DIASTOLIC BLOOD PRESSURE: 65 MMHG | OXYGEN SATURATION: 97 % | WEIGHT: 242.5 LBS

## 2024-01-05 DIAGNOSIS — L02.91 ABSCESS: Primary | ICD-10-CM

## 2024-01-05 PROCEDURE — 99213 OFFICE O/P EST LOW 20 MIN: CPT | Mod: PBBFAC

## 2024-01-05 PROCEDURE — 99999 PR PBB SHADOW E&M-EST. PATIENT-LVL III: CPT | Mod: PBBFAC,,,

## 2024-01-05 PROCEDURE — 99213 OFFICE O/P EST LOW 20 MIN: CPT | Mod: S$PBB,,,

## 2024-01-05 RX ORDER — MUPIROCIN 20 MG/G
OINTMENT TOPICAL 3 TIMES DAILY
Qty: 15 G | Refills: 0 | Status: SHIPPED | OUTPATIENT
Start: 2024-01-05

## 2024-01-05 NOTE — PROGRESS NOTES
Subjective     Patient ID: Sj Gonzalez is a 68 y.o. male.    Chief Complaint: Cyst (Under right arm)    Pt seen in clinic today for cyst under R arm. Started 4-5 days ago. Had one in hairline a few years ago. Reports tender red lump. Denies fever, drainage.     Cyst      Review of Systems   Constitutional: Negative.           Objective     Physical Exam  Vitals reviewed.   Constitutional:       Appearance: He is well-developed.   HENT:      Head: Normocephalic and atraumatic.   Eyes:      Conjunctiva/sclera: Conjunctivae normal.   Cardiovascular:      Rate and Rhythm: Normal rate.   Pulmonary:      Effort: Pulmonary effort is normal. No respiratory distress.   Skin:     General: Skin is warm and dry.      Findings: Abscess present. No rash.             Comments: 1 cm x 0.5 cm red abscess without head or drainage under R arm..   Neurological:      Mental Status: He is alert and oriented to person, place, and time.      Coordination: Coordination normal.   Psychiatric:         Behavior: Behavior normal.            Assessment and Plan     1. Abscess  -     mupirocin (BACTROBAN) 2 % ointment; Apply topically 3 (three) times daily.  Dispense: 15 g; Refill: 0    Pt instructed to apply warm compresses 2-3 x day until a head appears and then it can be drained. Apply bactroban ointment 3 times daily and keep area clean and dry. F/u in clinic for unresolved symptoms despite treatment..             No follow-ups on file.

## 2024-01-08 ENCOUNTER — PATIENT MESSAGE (OUTPATIENT)
Dept: INTERNAL MEDICINE | Facility: CLINIC | Age: 69
End: 2024-01-08
Payer: MEDICARE

## 2024-01-09 ENCOUNTER — TELEPHONE (OUTPATIENT)
Dept: PRIMARY CARE CLINIC | Facility: CLINIC | Age: 69
End: 2024-01-09
Payer: MEDICARE

## 2024-01-09 NOTE — TELEPHONE ENCOUNTER
----- Message from Azael Vasquez sent at 1/9/2024  3:54 PM CST -----   Name of Who is Calling:     What is the request in detail: calling in reference to copy of clinical notes / certificate of necessity was received 12/29/23   Please contact to further discuss and advise      Can the clinic reply by [facility]:     What Number to Call Back if not in [facility]:  [facility] / [facility] / [phone]/ fax# [phone]

## 2024-01-29 ENCOUNTER — PATIENT OUTREACH (OUTPATIENT)
Dept: ADMINISTRATIVE | Facility: HOSPITAL | Age: 69
End: 2024-01-29
Payer: MEDICARE

## 2024-02-14 ENCOUNTER — PATIENT OUTREACH (OUTPATIENT)
Dept: ADMINISTRATIVE | Facility: HOSPITAL | Age: 69
End: 2024-02-14
Payer: MEDICARE

## 2024-02-19 ENCOUNTER — PATIENT MESSAGE (OUTPATIENT)
Dept: PRIMARY CARE CLINIC | Facility: CLINIC | Age: 69
End: 2024-02-19
Payer: MEDICARE

## 2024-02-19 DIAGNOSIS — R79.89 LOW TESTOSTERONE: Primary | ICD-10-CM

## 2024-02-19 DIAGNOSIS — D64.9 NORMOCYTIC ANEMIA: ICD-10-CM

## 2024-02-19 DIAGNOSIS — Z79.4 TYPE 2 DIABETES MELLITUS WITH HYPERGLYCEMIA, WITH LONG-TERM CURRENT USE OF INSULIN: ICD-10-CM

## 2024-02-19 DIAGNOSIS — E11.65 TYPE 2 DIABETES MELLITUS WITH HYPERGLYCEMIA, WITH LONG-TERM CURRENT USE OF INSULIN: ICD-10-CM

## 2024-02-20 ENCOUNTER — OFFICE VISIT (OUTPATIENT)
Dept: URGENT CARE | Facility: CLINIC | Age: 69
End: 2024-02-20
Payer: MEDICARE

## 2024-02-20 VITALS
RESPIRATION RATE: 17 BRPM | SYSTOLIC BLOOD PRESSURE: 161 MMHG | BODY MASS INDEX: 32.78 KG/M2 | WEIGHT: 242 LBS | DIASTOLIC BLOOD PRESSURE: 98 MMHG | HEIGHT: 72 IN | HEART RATE: 73 BPM | OXYGEN SATURATION: 98 % | TEMPERATURE: 98 F
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
## 2024-02-20 DIAGNOSIS — S29.9XXA TRAUMATIC INJURY OF RIB: Primary | ICD-10-CM

## 2024-02-20 DIAGNOSIS — R07.81 RIB PAIN ON RIGHT SIDE: ICD-10-CM

## 2024-02-20 PROCEDURE — 99203 OFFICE O/P NEW LOW 30 MIN: CPT | Mod: 25,S$GLB,, | Performed by: NURSE PRACTITIONER

## 2024-02-20 PROCEDURE — 96372 THER/PROPH/DIAG INJ SC/IM: CPT | Mod: S$GLB,,, | Performed by: FAMILY MEDICINE

## 2024-02-20 PROCEDURE — 71101 X-RAY EXAM UNILAT RIBS/CHEST: CPT | Mod: RT,S$GLB,, | Performed by: RADIOLOGY

## 2024-02-20 RX ORDER — CYCLOBENZAPRINE HCL 10 MG
10 TABLET ORAL 3 TIMES DAILY PRN
Qty: 30 TABLET | Refills: 0 | Status: SHIPPED | OUTPATIENT
Start: 2024-02-20 | End: 2024-03-01

## 2024-02-20 RX ORDER — NAPROXEN 500 MG/1
500 TABLET ORAL 2 TIMES DAILY
Qty: 60 TABLET | Refills: 0 | Status: SHIPPED | OUTPATIENT
Start: 2024-02-20 | End: 2024-03-21

## 2024-02-20 RX ORDER — KETOROLAC TROMETHAMINE 30 MG/ML
30 INJECTION, SOLUTION INTRAMUSCULAR; INTRAVENOUS
Status: COMPLETED | OUTPATIENT
Start: 2024-02-20 | End: 2024-02-20

## 2024-02-20 RX ADMIN — KETOROLAC TROMETHAMINE 30 MG: 30 INJECTION, SOLUTION INTRAMUSCULAR; INTRAVENOUS at 03:02

## 2024-02-20 NOTE — PROGRESS NOTES
Subjective:      Patient ID: Sj Gonzalez is a 68 y.o. male.    Vitals:  height is 6' (1.829 m) and weight is 109.8 kg (242 lb). His oral temperature is 98.3 °F (36.8 °C). His blood pressure is 161/98 (abnormal) and his pulse is 73. His respiration is 17 and oxygen saturation is 98%.     Chief Complaint: Rib Injury    69 y/o male presents today with a complaint of right rib pain secondary to sustaining a traumatic fall three days ago.  Reports falling approximately 6 feet on a concrete surface.  Denies traumatic head injury, dizziness, SOB, or palpitations.  Reports reproducible right upper rib pain, pain with sneezing and deep inhalation.      Injury  This is a new problem. The current episode started today. The problem occurs constantly. The problem has been unchanged. Pertinent negatives include no chills, coughing, fever, myalgias, nausea, numbness, rash or weakness. Treatments tried: aleve. The treatment provided no relief.       Constitution: Negative for chills, fever and generalized weakness.   Cardiovascular:  Negative for palpitations and sob on exertion.   Respiratory:  Negative for cough, shortness of breath, stridor, wheezing and asthma.         Pain with inhalation   Gastrointestinal:  Negative for nausea.   Musculoskeletal:  Negative for muscle ache.   Skin:  Negative for rash, erythema and bruising.   Allergic/Immunologic: Negative for asthma.   Neurological:  Negative for numbness.      Objective:     Physical Exam   Constitutional: He is oriented to person, place, and time. He appears well-developed. He is cooperative.  Non-toxic appearance. He does not appear ill. No distress.   HENT:   Head: Normocephalic and atraumatic.   Ears:   Right Ear: Hearing, tympanic membrane, external ear and ear canal normal.   Left Ear: Hearing, tympanic membrane, external ear and ear canal normal.   Nose: Nose normal. No mucosal edema or nasal deformity. No epistaxis. Right sinus exhibits no maxillary sinus  tenderness and no frontal sinus tenderness. Left sinus exhibits no maxillary sinus tenderness and no frontal sinus tenderness.   Mouth/Throat: Uvula is midline, oropharynx is clear and moist and mucous membranes are normal. Mucous membranes are moist. No trismus in the jaw. Normal dentition. No uvula swelling.   Eyes: Conjunctivae and lids are normal.   Neck: Trachea normal and phonation normal. Neck supple.   Cardiovascular: Normal rate, regular rhythm, normal heart sounds and normal pulses.   Pulmonary/Chest: Effort normal and breath sounds normal. No stridor. No respiratory distress. He has no wheezes. He has no rhonchi. He has no rales. Chest wall is not dull to percussion. He exhibits tenderness and bony tenderness. He exhibits no mass, no laceration, no crepitus, no edema, no deformity, no swelling and no retraction.   Right rib chest wall tenderness, negative ecchymosis, negative deformity, negative abrasion, contusion, or laceration             Comments: Right rib chest wall tenderness, negative ecchymosis, negative deformity, negative abrasion, contusion, or laceration    Abdominal: Normal appearance and bowel sounds are normal. Soft.   Musculoskeletal: Normal range of motion.         General: Normal range of motion.   Neurological: He is alert and oriented to person, place, and time. He exhibits normal muscle tone.   Skin: Skin is warm, dry, intact and not diaphoretic. No erythema   Psychiatric: His speech is normal and behavior is normal. Judgment and thought content normal.   Nursing note and vitals reviewed.    Assessment:     1. Traumatic injury of rib    2. Rib pain on right side    3. Fall, initial encounter      EXAMINATION:  XR RIBS MIN 3 VIEWS W/ PA CHEST RIGHT     CLINICAL HISTORY:  Unspecified injury of thorax, initial encounter     TECHNIQUE:  Single frontal chest x-ray and 2 x-ray views of the right-sided ribs.     COMPARISON:  Chest x-ray dated 04/21/2008.     FINDINGS:  Chest x-ray:     The  trachea is unremarkable.  The cardiomediastinal silhouette is within normal limits.  There is no evidence of free air beneath hemidiaphragms.  There are no pleural effusions.  There is no evidence of a pneumothorax.  There is no evidence of pneumomediastinum.  No airspace opacity is present.  There are degenerative changes in the osseous structures.     Right-sided ribs:     The bone mineralization is within normal limits.  There is no cortical step-off.  There is no evidence of a displaced right-sided rib fracture.     Impression:     No acute process.  Follow-up, as clinically warranted.        Electronically signed by: Radu Peck MD  Date:                                            02/20/2024  Time:                                           16:29           Exam Ended: 02/20/24 15:39 CST           Plan:   Traumatic injury of rib  -     Cancel: XR RIB LEFT W/ PA CHEST; Future; Expected date: 02/20/2024  -     Cancel: XR RIB RIGHT W/ PA CHEST; Future; Expected date: 02/20/2024  -     ketorolac injection 30 mg  -     cyclobenzaprine (FLEXERIL) 10 MG tablet; Take 1 tablet (10 mg total) by mouth 3 (three) times daily as needed for Muscle spasms (30).  Dispense: 30 tablet; Refill: 0    Rib pain on right side  -     ketorolac injection 30 mg  -     naproxen (NAPROSYN) 500 MG tablet; Take 1 tablet (500 mg total) by mouth 2 (two) times daily.  Dispense: 60 tablet; Refill: 0  -     cyclobenzaprine (FLEXERIL) 10 MG tablet; Take 1 tablet (10 mg total) by mouth 3 (three) times daily as needed for Muscle spasms (30).  Dispense: 30 tablet; Refill: 0    Fall, initial encounter    Rib pain/sprain:    -  Please return here or go to the Emergency Department for any concerns or worsening of condition such as:  Dizziness, weakness or fainting  Shortness of breath with or without chest discomfort  New or worsening abdominal pain  Discomfort in other areas of your upper body such as your shoulders, jaw, neck, or arms    -  If you were  not prescribed an anti-inflammatory medication, and if you do not have any history of stomach/intestinal ulcers, or kidney disease, or are not taking a blood thinner such as Coumadin, Plavix, Pradaxa, Eloquis, or Xaralta for example, it is OK to take over the counter Ibuprofen or Advil or Motrin or Aleve as directed. Do not take these medications on an empty stomach.     -  Hold a pillow or similar soft brace against fracture site  When coughing, sneezing or laughing.     - Warm compress to affected area as needed for comfort.    -  To keep your lungs healthy while your rib(s) heal, breath deeply and cough every hour or so while you are awake.     -  Avoid heavy lifting or sports for the first three weeks. Begin to increase activity level, but can be done gradually.         You must understand that you've received an Urgent Care treatment only and that you may be released before all your medical problems are known or treated. You, the patient, will arrange for follow up care as instructed.  Follow up with your PCP or specialty clinic as directed in the next 1-2 weeks if not improved or as needed.  You can call [phone] to schedule an appointment with the appropriate provider.  If your condition worsens we recommend that you receive another evaluation at the emergency room immediately or contact your primary medical clinics after hours call service to discuss your concerns.  Please return here or go to the Emergency Department for any concerns or worsening of condition.    If you were prescribed a narcotic or controlled medication, do not drive or operate heavy equipment or machinery while taking these medications.    Thank you for choosing [facility]!    Our goal in the Urgent Care is to always provide outstanding medical care. You may receive a survey by mail or e-mail in the next week regarding your experience today. We would greatly appreciate you completing and returning the survey. Your feedback  provides us with a way to recognize our staff who provide very good care, and it helps us learn how to improve when your experience was below our aspiration of excellence.      We appreciate you trusting us with your medical care. We hope you feel better soon. We will be happy to take care of you for all of your future medical needs.   This note was prepared using voice-recognition software.  Although efforts are made to proofread the note, some errors may persist in the final document.     Sincerely,    Garo Hart DNP, FNP-C

## 2024-02-20 NOTE — PATIENT INSTRUCTIONS
Rib pain/sprain:    -  Please return here or go to the Emergency Department for any concerns or worsening of condition such as:  Dizziness, weakness or fainting  Shortness of breath with or without chest discomfort  New or worsening abdominal pain  Discomfort in other areas of your upper body such as your shoulders, jaw, neck, or arms    -  If you were not prescribed an anti-inflammatory medication, and if you do not have any history of stomach/intestinal ulcers, or kidney disease, or are not taking a blood thinner such as Coumadin, Plavix, Pradaxa, Eloquis, or Xaralta for example, it is OK to take over the counter Ibuprofen or Advil or Motrin or Aleve as directed. Do not take these medications on an empty stomach.     -  Hold a pillow or similar soft brace against fracture site  When coughing, sneezing or laughing.     - Warm compress to affected area as needed for comfort.    -  To keep your lungs healthy while your rib(s) heal, breath deeply and cough every hour or so while you are awake.     -  Avoid heavy lifting or sports for the first three weeks. Begin to increase activity level, but can be done gradually.         You must understand that you've received an Urgent Care treatment only and that you may be released before all your medical problems are known or treated. You, the patient, will arrange for follow up care as instructed.  Follow up with your PCP or specialty clinic as directed in the next 1-2 weeks if not improved or as needed.  You can call [phone] to schedule an appointment with the appropriate provider.  If your condition worsens we recommend that you receive another evaluation at the emergency room immediately or contact your primary medical clinics after hours call service to discuss your concerns.  Please return here or go to the Emergency Department for any concerns or worsening of condition.    If you were prescribed a narcotic or controlled medication, do not drive or operate heavy  equipment or machinery while taking these medications.    Thank you for choosing [facility]!    Our goal in the Urgent Care is to always provide outstanding medical care. You may receive a survey by mail or e-mail in the next week regarding your experience today. We would greatly appreciate you completing and returning the survey. Your feedback provides us with a way to recognize our staff who provide very good care, and it helps us learn how to improve when your experience was below our aspiration of excellence.      We appreciate you trusting us with your medical care. We hope you feel better soon. We will be happy to take care of you for all of your future medical needs.   This note was prepared using voice-recognition software.  Although efforts are made to proofread the note, some errors may persist in the final document.     Sincerely,    Garo Hart DNP, FNP-C

## 2024-02-21 NOTE — PROGRESS NOTES
FAMILY MEDICINE  [facility]    Reason for visit:   Chief Complaint   Patient presents with    Follow-up    Diabetes         SUBJECTIVE: Sj Gonzalez is a 68 y.o. male  - obesity, hypertension, hyperlipidemia, type 2 diabetes, lumbar degenerative disc disease, and peripheral arterial disease presents for diabetes follow-up     Cardiology: Dr. Jeovany Hart   Endocrinology Dr. Castillo Guzman  Podiatry Dr. Zepeda  Gastroenterology: Dr. Hodges  Urology Dr. Reno Riddle    Sj Gonzalez reports overall that he is doing well but he is still suffers from erectile dysfunction.  He was also concerned about his low testosterone and would like testosterone supplementation.  Did see Dr. Avila last year reports that he would discuss testosterone supplementation but he never got the prescription.  They also talked about surgical options.  He would like to restart his Cialis 5 mg daily which has helped him in the past as well with his erectile dysfunction.  He reports that is been unchanged for several years.  In the past when he was supplement with testosterone when he was about 52 years old he reported a significant improvement.    Since last visit he has not seen his endocrinologist and does not have an appointment scheduled currently.  But he plans to have it scheduled.  He had to misses 12/2023 appointment with Endocrinology.  He has restarted his Ozempic 2 mg weekly and tolerating well.  His glucose levels have significantly improved.  He has a continuous glucose monitor which helps him.  He is only eating 1 meal a day and reports that when he does eat anything with the carbohydrate his glucose will increase to 200 -300 mg/dL however within 1-2 hours his glucose levels improved.  He has lost some weight since his last visit.    He was also recently seen at urgent care 02/20/2024 after fall.  He would bruised rib.  He reports he is healing well.    1. Diabetes Type 2     Endocrinology Dr. Guzman      Current Outpatient Medications  flash glucose scanning reader (FREESTYLE MAKI 14 DAY READER) Misc, use once daily, Disp: 1 each, Rfl: 0  flash glucose sensor (FREESTYLE MAKI 14 DAY SENSOR) Kit, use and change every 14 days, Disp: 2 kit, Rfl: 11  insulin (BASAGLAR KWIKPEN U-100 INSULIN) glargine 100 units/mL SubQ pen, Inject 35 Units into the skin once  semaglutide (OZEMPIC) 2 mg/dose (8 mg/3 mL) PnIj, Inject 2 mg into the skin every 7 days., Disp: 9 mL, Rfl: 1    Side effects from treatment:  denies      Prior medication:   Metformin (stomach cramps)  Trulicity (too expensive)   Glipizide (discontinue started Trulicity with Basaglar)  Pioglitazone (cost)    Complications of diabetes: history of diabetic foot ulcer healed; PAD     Glucometer: FreeStyle Maki 3  Glucose monitoring: premeal and bedtime    Lab Results       Component                Value               Date                       HGBA1C                   6.9 (H)             02/20/2024              Low dose statin: Rosuvastatin   Last eye exam:6/8/22. Scheduled 5/1/24  Last foot exam: 3/29/2023     Vaccines:   Influenza:  Due each flu season and pt declined  Prevnar 20: recommended and he declines  Covid-19 recommended and he declines     2. Hyperlipidemia  - aortic atherosclerosis  - LDL goal < 70    Current treatment:  rosuvastatin (CRESTOR) 20 MG tablet, Take 1 tablet (20 mg total) by mouth once daily., Disp: 90 tablet, Rfl: 3    Side effects from current treatment: none    Lab Results       Component                Value               Date                       CHOL                     138                 02/20/2024                 CHOL                     141                 12/12/2022                 CHOL                     142                 02/04/2022            Lab Results       Component                Value               Date                       HDL                      40                  02/20/2024                 HDL                       47                  12/12/2022                 HDL                      39 (L)              02/04/2022            Lab Results       Component                Value               Date                       LDLCALC                  85.0                02/20/2024                 LDLCALC                  83.8                12/12/2022                 LDLCALC                  90.2                02/04/2022            Lab Results       Component                Value               Date                       TRIG                     65                  02/20/2024                 TRIG                     51                  12/12/2022                 TRIG                     64                  02/04/2022            Lab Results       Component                Value               Date                       CHOLHDL                  29.0                02/20/2024                 CHOLHDL                  33.3                12/12/2022                 CHOLHDL                  27.5                02/04/2022                      Diabetes        Review of Systems   All other systems reviewed and are negative.      HEALTH MAINTENANCE:   Health Maintenance   Topic Date Due    Aspirin/Antiplatelet Therapy  Never done    Shingles Vaccine (1 of 2) Never done    Eye Exam  05/01/2024 (Originally 6/8/2023)    Hemoglobin A1c  08/20/2024    PROSTATE-SPECIFIC ANTIGEN  10/31/2024    Foot Exam  11/27/2024    Lipid Panel  02/20/2025    High Dose Statin  02/28/2025    Colorectal Cancer Screening  09/25/2030    TETANUS VACCINE  06/07/2032    Hepatitis C Screening  Completed     Health Maintenance Topics with due status: Not Due       Topic Last Completion Date    Colorectal Cancer Screening 09/25/2020    TETANUS VACCINE 06/07/2022    PROSTATE-SPECIFIC ANTIGEN 10/31/2023    Foot Exam 11/27/2023    Diabetes Urine Screening 02/20/2024    Lipid Panel 02/20/2024    Hemoglobin A1c 02/20/2024    High Dose Statin 02/28/2024     Health Maintenance Due    Topic Date Due    COVID-19 Vaccine (1) Never done    Pneumococcal Vaccines (Age 65+) (1 of 2 - PCV) Never done    Aspirin/Antiplatelet Therapy  Never done    Shingles Vaccine (1 of 2) Never done    RSV Vaccine (Age 60+ and Pregnant patients) (1 - 1-dose 60+ series) Never done    Influenza Vaccine (1) 09/01/2023       HISTORY:   Past Medical History:   Diagnosis Date    COVID-19 04/12/2022    - COVID-19 positive - counseling on management COVID-19 - COVID risk score equals 5 - Recommend Paxlovid    Diabetes mellitus     Epigastric pain 09/07/2022    Generalized abdominal pain 03/16/2022    - unknown etiology - reviewed x-ray, abdominal ultrasound and colonoscopy - recommend CT scan abdomen pelvis - recommend re-evaluation by gastroenterology    Heart murmur     MVP    Hypertension     Non-recurrent unilateral inguinal hernia without obstruction or gangrene 09/30/2021    Type 2 diabetes mellitus with foot ulcer, with long-term current use of insulin 11/09/2021    Lab Results  Component  Value  Date     HGBA1C  8.5 (H)  11/20/2023     - +PAD  - ulcer resolved    Urinary tract infection        Past Surgical History:   Procedure Laterality Date    AORTOGRAPHY WITH SERIALOGRAPHY N/A 11/04/2021    Procedure: AORTOGRAM, WITH SERIALOGRAPHY;  Surgeon: Jeovany Hart III, MD;  Location: [facility] CATH LAB;  Service: Cardiology;  Laterality: N/A;    ARTHROSCOPY OF KNEE Right     COLONOSCOPY N/A 09/25/2020    Procedure: COLONOSCOPY;  Surgeon: Vicky Hodges MD;  Location: [facility] ENDO;  Service: Endoscopy;  Laterality: N/A;    ESOPHAGOGASTRODUODENOSCOPY N/A 09/16/2022    Procedure: EGD (ESOPHAGOGASTRODUODENOSCOPY);  Surgeon: Vicky Hodges MD;  Location: [facility] ENDO;  Service: Endoscopy;  Laterality: N/A;    HERNIA REPAIR      ROBOT-ASSISTED LAPAROSCOPIC REPAIR OF INGUINAL HERNIA Right 09/30/2021    Procedure: ROBOTIC REPAIR, HERNIA, INGUINAL;  Surgeon: Carmelo Farooq MD;  Location: [facility] OR;  Service: General;   Laterality: Right;    SPINE SURGERY      Lumbar       Family History   Problem Relation Age of Onset    No Known Problems Mother     No Known Problems Father     Diabetes Sister     Diabetes Brother     No Known Problems Daughter     Cancer Neg Hx     Heart disease Neg Hx     Prostate cancer Neg Hx     Kidney disease Neg Hx        Social History     Tobacco Use    Smoking status: Never     Passive exposure: Never    Smokeless tobacco: Never   Substance Use Topics    Alcohol use: Yes     Comment: daily    Drug use: No       Social History     Social History Narrative    He lives in [place]. He lives with his wife and 2 children. His children are attending [employer]. He lived [place]. . Nonsmoker. Social drinker. 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 His son attended [employer].        ALLERGIES:   Review of patient's allergies indicates:   Allergen Reactions    Metformin      GI intolerance    Lisinopril        MEDS:   Outpatient Medications as of 2/28/2024   Medication Sig Dispense Refill    acyclovir 5% (ZOVIRAX) 5 % ointment Apply topically 5 (five) times daily. 15 g 11    amLODIPine (NORVASC) 10 MG tablet Take 1 tablet (10 mg total) by mouth once daily. 90 tablet 3    blood sugar diagnostic Strp Test blood sugar 3 (three) times daily before meals. 100 strip 11    cyclobenzaprine (FLEXERIL) 10 MG tablet Take 1 tablet (10 mg total) by mouth 3 (three) times daily as needed for Muscle spasms (30). 30 tablet 0    flash glucose scanning reader (FREESTYLE MAKI 14 DAY READER) Misc use once daily 1 each 0    flash glucose sensor (FREESTYLE MAKI 14 DAY SENSOR) Kit use and change every 14 days 2 kit 11    insulin (BASAGLAR KWIKPEN U-100 INSULIN) glargine 100 units/mL SubQ pen Inject 35 Units into the skin once daily. 30 mL 3    losartan-hydrochlorothiazide 100-25 mg (HYZAAR) 100-25 mg per tablet Take 1 tablet by mouth once daily. 90 tablet 3    mupirocin (BACTROBAN) 2 % ointment Apply topically 3 (three)  "times daily. 15 g 0    naproxen (NAPROSYN) 500 MG tablet Take 1 tablet (500 mg total) by mouth 2 (two) times daily. 60 tablet 0    omeprazole (PRILOSEC) 40 MG capsule Take 1 capsule (40 mg total) by mouth once daily. 30 capsule 5    pen needle, diabetic 32 gauge x 5/32" Ndle Use nightly as directed with insulin pen. 100 each 5    rosuvastatin (CRESTOR) 20 MG tablet Take 1 tablet (20 mg total) by mouth once daily. 90 tablet 3    tadalafiL (CIALIS) 5 MG tablet Take 1 tablet (5 mg total) by mouth once daily. 90 tablet 3    valACYclovir (VALTREX) 1000 MG tablet 1 tab po BID x 5 days prn cold sore 60 tablet 11     No current facility-administered medications on file as of 2/28/2024.       Vital signs:   Vitals:    02/28/24 0817   BP: 132/80   Weight: 105.7 kg (233 lb)   Height: 6' (1.829 m)       Body mass index is 31.6 kg/m².    PHYSICAL EXAM:     Physical Exam  Constitutional:       General: He is not in acute distress.  Pulmonary:      Effort: Pulmonary effort is normal. No respiratory distress.   Neurological:      Mental Status: He is alert.   Psychiatric:         Speech: Speech normal.             PERTINENT RESULTS:   Lab Visit on 02/20/2024   Component Date Value Ref Range Status    Microalbumin, Urine 02/20/2024 10.0  ug/mL Final    Creatinine, Urine 02/20/2024 61.0  23.0 - 375.0 mg/dL Final    Microalb/Creat Ratio 02/20/2024 16.4  0.0 - 30.0 ug/mg Final   Lab Visit on 02/20/2024   Component Date Value Ref Range Status    Cholesterol 02/20/2024 138  120 - 199 mg/dL Final    Comment: The National Cholesterol Education Program (NCEP) has set the  following guidelines (reference ranges) for Cholesterol:  Optimal.....................<200 mg/dL  Borderline High.............200-239 mg/dL  High........................> or = 240 mg/dL      Triglycerides 02/20/2024 65  30 - 150 mg/dL Final    Comment: The National Cholesterol Education Program (NCEP) has set the  following guidelines (reference values) for " triglycerides:  Normal......................<150 mg/dL  Borderline High.............150-199 mg/dL  High........................200-499 mg/dL      HDL 02/20/2024 40  40 - 75 mg/dL Final    Comment: The National Cholesterol Education Program (NCEP) has set the  following guidelines (reference values) for HDL Cholesterol:  Low...............<40 mg/dL  Optimal...........>60 mg/dL      LDL Cholesterol 02/20/2024 85.0  63.0 - 159.0 mg/dL Final    Comment: The National Cholesterol Education Program (NCEP) has set the  following guidelines (reference values) for LDL Cholesterol:  Optimal.......................<130 mg/dL  Borderline High...............130-159 mg/dL  High..........................160-189 mg/dL  Very High.....................>190 mg/dL      HDL/Cholesterol Ratio 02/20/2024 29.0  20.0 - 50.0 % Final    Total Cholesterol/HDL Ratio 02/20/2024 3.5  2.0 - 5.0 Final    Non-HDL Cholesterol 02/20/2024 98  mg/dL Final    Comment: Risk category and Non-HDL cholesterol goals:  Coronary heart disease (CHD)or equivalent (10-year risk of CHD >20%):  Non-HDL cholesterol goal     <130 mg/dL  Two or more CHD risk factors and 10-year risk of CHD <= 20%:  Non-HDL cholesterol goal     <160 mg/dL  0 to 1 CHD risk factor:  Non-HDL cholesterol goal     <190 mg/dL      Hemoglobin A1C 02/20/2024 6.9 (H)  4.0 - 5.6 % Final    Comment: ADA Screening Guidelines:  5.7-6.4%  Consistent with prediabetes  >or=6.5%  Consistent with diabetes    High levels of fetal hemoglobin interfere with the HbA1C  assay. Heterozygous hemoglobin variants (HbS, HgC, etc)do  not significantly interfere with this assay.   However, presence of multiple variants may affect accuracy.      Estimated Avg Glucose 02/20/2024 151 (H)  68 - 131 mg/dL Final    Sodium 02/20/2024 140  136 - 145 mmol/L Final    Potassium 02/20/2024 4.7  3.5 - 5.1 mmol/L Final    Chloride 02/20/2024 106  95 - 110 mmol/L Final    CO2 02/20/2024 31 (H)  23 - 29 mmol/L Final    Glucose  02/20/2024 136 (H)  70 - 110 mg/dL Final    BUN 02/20/2024 15  2 - 20 mg/dL Final    Creatinine 02/20/2024 0.89  0.50 - 1.40 mg/dL Final    Calcium 02/20/2024 9.2  8.7 - 10.5 mg/dL Final    Total Protein 02/20/2024 7.8  6.0 - 8.4 g/dL Final    Albumin 02/20/2024 4.3  3.5 - 5.2 g/dL Final    Total Bilirubin 02/20/2024 0.8  0.1 - 1.0 mg/dL Final    Comment: For infants and newborns, interpretation of results should be based  on gestational age, weight and in agreement with clinical  observations.    Premature Infant recommended reference ranges:  Up to 24 hours.............<8.0 mg/dL  Up to 48 hours............<12.0 mg/dL  3-5 days..................<15.0 mg/dL  6-29 days.................<15.0 mg/dL      Alkaline Phosphatase 02/20/2024 63  38 - 126 U/L Final    AST 02/20/2024 41  15 - 46 U/L Final    ALT 02/20/2024 46 (H)  10 - 44 U/L Final    Anion Gap 02/20/2024 3 (L)  8 - 16 mmol/L Final    eGFR 02/20/2024 >60.0  >60 mL/min/1.73 m^2 Final    WBC 02/20/2024 7.23  3.90 - 12.70 K/uL Final    RBC 02/20/2024 4.44 (L)  4.60 - 6.20 M/uL Final    Hemoglobin 02/20/2024 14.6  14.0 - 18.0 g/dL Final    Hematocrit 02/20/2024 41.6  40.0 - 54.0 % Final    MCV 02/20/2024 94  82 - 98 fL Final    MCH 02/20/2024 32.9 (H)  27.0 - 31.0 pg Final    MCHC 02/20/2024 35.1  32.0 - 36.0 g/dL Final    RDW 02/20/2024 11.3 (L)  11.5 - 14.5 % Final    Platelets 02/20/2024 242  150 - 450 K/uL Final    MPV 02/20/2024 9.5  9.2 - 12.9 fL Final    Immature Granulocytes 02/20/2024 0.3  0.0 - 0.5 % Final    Gran # (ANC) 02/20/2024 4.6  1.8 - 7.7 K/uL Final    Immature Grans (Abs) 02/20/2024 0.02  0.00 - 0.04 K/uL Final    Comment: Mild elevation in immature granulocytes is non specific and   can be seen in a variety of conditions including stress response,   acute inflammation, trauma and pregnancy. Correlation with other   laboratory and clinical findings is essential.      Lymph # 02/20/2024 1.6  1.0 - 4.8 K/uL Final    Mono # 02/20/2024 0.9  0.3 -  1.0 K/uL Final    Eos # 02/20/2024 0.1  0.0 - 0.5 K/uL Final    Baso # 02/20/2024 0.08  0.00 - 0.20 K/uL Final    nRBC 02/20/2024 0  0 /100 WBC Final    Gran % 02/20/2024 62.9  38.0 - 73.0 % Final    Lymph % 02/20/2024 22.5  18.0 - 48.0 % Final    Mono % 02/20/2024 11.8  4.0 - 15.0 % Final    Eosinophil % 02/20/2024 1.4  0.0 - 8.0 % Final    Basophil % 02/20/2024 1.1  0.0 - 1.9 % Final    Differential Method 02/20/2024 Automated   Final    Testosterone, Total 02/20/2024 318  304 - 1227 ng/dL Final       ASSESSMENT/PLAN:    1. Type 2 diabetes mellitus with diabetic peripheral angiopathy and gangrene, without long-term current use of insulin  Overview:    Lab Results   Component Value Date    HGBA1C 6.9 (H) 02/20/2024     - A1C improved from 8.5% to 6.9% once he was able to get his semaglutide  - well controlled  - continue current management plan   - patient encouraged to notify me with any changes  - eye exam scheduled for 05/01/2024    Orders:  -     semaglutide (OZEMPIC) 2 mg/dose (8 mg/3 mL) PnIj; Inject 2 mg into the skin every 7 days.  Dispense: 9 mL; Refill: 3  -     Hemoglobin A1C; Future; Expected date: 08/28/2024  -     Lipid Panel; Future; Expected date: 08/28/2024    2. Hyperlipidemia associated with type 2 diabetes mellitus  Overview:  Lab Results   Component Value Date    LDLCALC 85.0 02/20/2024     - LDL goal <70 near goal  - continue rosuvastatin 20 mg daily    Orders:  -     rosuvastatin (CRESTOR) 20 MG tablet; Take 1 tablet (20 mg total) by mouth once daily.  Dispense: 90 tablet; Refill: 3  -     Comprehensive Metabolic Panel; Future; Expected date: 08/28/2024    3. Hypertension associated with type 2 diabetes mellitus  Overview:  - well controlled  - continue current medications    Orders:  -     amLODIPine (NORVASC) 10 MG tablet; Take 1 tablet (10 mg total) by mouth once daily.  Dispense: 90 tablet; Refill: 3  -     Comprehensive Metabolic Panel; Future; Expected date: 08/28/2024    4.  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  No intervention  - goal LDL <70  - BP goal < 130/80  - ASA 81 mg daily  - A1C goal </=7%    Orders:  -     rosuvastatin (CRESTOR) 20 MG tablet; Take 1 tablet (20 mg total) by mouth once daily.  Dispense: 90 tablet; Refill: 3  -     Comprehensive Metabolic Panel; Future; Expected date: 08/28/2024    5. Elevated LFTs  Overview:  Lab Results   Component Value Date    ALT 46 (H) 02/20/2024    AST 41 02/20/2024    ALKPHOS 63 02/20/2024    BILITOT 0.8 02/20/2024     - mild and improving    Orders:  -     Comprehensive Metabolic Panel; Future; Expected date: 08/28/2024    6. Low testosterone  Overview:  - he reports that he will go back to his Endocrinologist who managed his testosterone in the past    Orders:  -     Ambulatory referral/consult to Urology; Future; Expected date: 03/06/2024  -     TESTOSTERONE; Future; Expected date: 08/28/2024    7. Erectile dysfunction, unspecified erectile dysfunction type  Overview:  - okay to restart Cialis 5 mg daily  - he has been evaluated by urology    Orders:  -     tadalafiL (CIALIS) 5 MG tablet; Take 1 tablet (5 mg total) by mouth once daily.  Dispense: 90 tablet; Refill: 3    8. Class 1 obesity due to excess calories with serious comorbidity and body mass index (BMI) of 31.0 to 31.9 in adult  Overview:  - discussed recommendation for diet and cardiovascular exercise  - counseling on lifestyle modifications for risk factor reduction  - good job with weight loss              ORDERS:   Orders Placed This Encounter    Comprehensive Metabolic Panel    Hemoglobin A1C    Lipid Panel    TESTOSTERONE    Ambulatory referral/consult to Urology    amLODIPine (NORVASC) 10 MG tablet     rosuvastatin (CRESTOR) 20 MG tablet    semaglutide (OZEMPIC) 2 mg/dose (8 mg/3 mL) PnIj    tadalafiL (CIALIS) 5 MG tablet       Vaccines recommended: PCV 20, shingles, flu and covid-19 booster. He declines all vaccines    Follow up in about 6 months (around 8/28/2024) for Annual, diabetes, Labs.      This note is dictated using the M*Modal Fluency Direct word recognition program. There are word recognition mistakes that are occasionally missed on review.    Dr. Idalia Alexandra D.O.   Family Medicine

## 2024-02-28 ENCOUNTER — OFFICE VISIT (OUTPATIENT)
Dept: PRIMARY CARE CLINIC | Facility: CLINIC | Age: 69
End: 2024-02-28
Attending: FAMILY MEDICINE
Payer: MEDICARE

## 2024-02-28 VITALS
SYSTOLIC BLOOD PRESSURE: 132 MMHG | WEIGHT: 233 LBS | DIASTOLIC BLOOD PRESSURE: 80 MMHG | HEIGHT: 72 IN | BODY MASS INDEX: 31.56 KG/M2

## 2024-02-28 DIAGNOSIS — E11.59 HYPERTENSION ASSOCIATED WITH TYPE 2 DIABETES MELLITUS: ICD-10-CM

## 2024-02-28 DIAGNOSIS — E11.52 TYPE 2 DIABETES MELLITUS WITH DIABETIC PERIPHERAL ANGIOPATHY AND GANGRENE, WITHOUT LONG-TERM CURRENT USE OF INSULIN: Primary | ICD-10-CM

## 2024-02-28 DIAGNOSIS — I70.263 ATHEROSCLEROSIS OF NATIVE ARTERY OF BOTH LOWER EXTREMITIES WITH GANGRENE: ICD-10-CM

## 2024-02-28 DIAGNOSIS — N52.9 ERECTILE DYSFUNCTION, UNSPECIFIED ERECTILE DYSFUNCTION TYPE: ICD-10-CM

## 2024-02-28 DIAGNOSIS — E66.09 CLASS 1 OBESITY DUE TO EXCESS CALORIES WITH SERIOUS COMORBIDITY AND BODY MASS INDEX (BMI) OF 31.0 TO 31.9 IN ADULT: ICD-10-CM

## 2024-02-28 DIAGNOSIS — R79.89 LOW TESTOSTERONE: ICD-10-CM

## 2024-02-28 DIAGNOSIS — I15.2 HYPERTENSION ASSOCIATED WITH TYPE 2 DIABETES MELLITUS: ICD-10-CM

## 2024-02-28 DIAGNOSIS — E11.69 HYPERLIPIDEMIA ASSOCIATED WITH TYPE 2 DIABETES MELLITUS: ICD-10-CM

## 2024-02-28 DIAGNOSIS — R79.89 ELEVATED LFTS: ICD-10-CM

## 2024-02-28 DIAGNOSIS — E78.5 HYPERLIPIDEMIA ASSOCIATED WITH TYPE 2 DIABETES MELLITUS: ICD-10-CM

## 2024-02-28 PROBLEM — R74.01 ELEVATED ALT MEASUREMENT: Status: RESOLVED | Noted: 2020-06-24 | Resolved: 2024-02-28

## 2024-02-28 PROBLEM — D64.9 NORMOCYTIC ANEMIA: Status: RESOLVED | Noted: 2023-03-29 | Resolved: 2024-02-28

## 2024-02-28 PROBLEM — Z79.4 TYPE 2 DIABETES MELLITUS WITH HYPERGLYCEMIA, WITH LONG-TERM CURRENT USE OF INSULIN: Status: RESOLVED | Noted: 2022-12-13 | Resolved: 2024-02-28

## 2024-02-28 PROBLEM — E11.65 TYPE 2 DIABETES MELLITUS WITH HYPERGLYCEMIA, WITH LONG-TERM CURRENT USE OF INSULIN: Status: RESOLVED | Noted: 2022-12-13 | Resolved: 2024-02-28

## 2024-02-28 PROCEDURE — 99214 OFFICE O/P EST MOD 30 MIN: CPT | Mod: 95,,, | Performed by: FAMILY MEDICINE

## 2024-02-28 RX ORDER — TADALAFIL 5 MG/1
5 TABLET ORAL DAILY
Qty: 90 TABLET | Refills: 3 | Status: SHIPPED | OUTPATIENT
Start: 2024-02-28 | End: 2025-02-27

## 2024-02-28 RX ORDER — ROSUVASTATIN CALCIUM 20 MG/1
20 TABLET, COATED ORAL DAILY
Qty: 90 TABLET | Refills: 3 | Status: SHIPPED | OUTPATIENT
Start: 2024-02-28 | End: 2025-02-27

## 2024-02-28 RX ORDER — AMLODIPINE BESYLATE 10 MG/1
10 TABLET ORAL DAILY
Qty: 90 TABLET | Refills: 3 | Status: SHIPPED | OUTPATIENT
Start: 2024-02-28 | End: 2025-02-27

## 2024-02-28 NOTE — Clinical Note
Please schedule annual 6 months in person DM2 with labs at [facility] prior to visit

## 2024-03-21 ENCOUNTER — OFFICE VISIT (OUTPATIENT)
Dept: OTOLARYNGOLOGY | Facility: CLINIC | Age: 69
End: 2024-03-21
Payer: MEDICARE

## 2024-03-21 ENCOUNTER — CLINICAL SUPPORT (OUTPATIENT)
Dept: OTOLARYNGOLOGY | Facility: CLINIC | Age: 69
End: 2024-03-21
Payer: MEDICARE

## 2024-03-21 VITALS
DIASTOLIC BLOOD PRESSURE: 71 MMHG | SYSTOLIC BLOOD PRESSURE: 129 MMHG | WEIGHT: 240 LBS | HEART RATE: 72 BPM | BODY MASS INDEX: 32.55 KG/M2

## 2024-03-21 DIAGNOSIS — H61.23 BILATERAL IMPACTED CERUMEN: Primary | ICD-10-CM

## 2024-03-21 DIAGNOSIS — H90.3 SENSORINEURAL HEARING LOSS (SNHL) OF BOTH EARS: Primary | ICD-10-CM

## 2024-03-21 DIAGNOSIS — H90.3 SENSORINEURAL HEARING LOSS (SNHL) OF BOTH EARS: ICD-10-CM

## 2024-03-21 PROCEDURE — 92557 COMPREHENSIVE HEARING TEST: CPT | Mod: PBBFAC,PN

## 2024-03-21 PROCEDURE — 99999 PR PBB SHADOW E&M-EST. PATIENT-LVL III: CPT | Mod: PBBFAC,,, | Performed by: NURSE PRACTITIONER

## 2024-03-21 PROCEDURE — 92567 TYMPANOMETRY: CPT | Mod: PBBFAC,PN

## 2024-03-21 PROCEDURE — 99213 OFFICE O/P EST LOW 20 MIN: CPT | Mod: 25,S$PBB,, | Performed by: NURSE PRACTITIONER

## 2024-03-21 PROCEDURE — 69210 REMOVE IMPACTED EAR WAX UNI: CPT | Mod: S$PBB,,, | Performed by: NURSE PRACTITIONER

## 2024-03-21 PROCEDURE — 99213 OFFICE O/P EST LOW 20 MIN: CPT | Mod: PBBFAC,PN | Performed by: NURSE PRACTITIONER

## 2024-03-21 PROCEDURE — 69209 REMOVE IMPACTED EAR WAX UNI: CPT | Mod: PBBFAC,PN | Performed by: NURSE PRACTITIONER

## 2024-03-21 NOTE — PROGRESS NOTES
Patient ID: Sj Gonzalez is a 68 y.o. y.o. male    Chief Complaint:   Chief Complaint   Patient presents with    Cerumen Impaction       Patient is self-referred for evaluation of a possible wax impaction in right ear.  he has complaints of hearing loss in the affected ear, but denies pain or drainage.  This has been an issue in the past.  The patient has not been using any sort of ear drop to soften the wax.      Review of Systems   Constitutional: Negative for fever, chills, fatigue and unexpected weight change.   HENT: Positive for ear blockage. Negative for hearing loss, nosebleeds, congestion, sore throat, facial swelling, rhinorrhea, sneezing, trouble swallowing, dental problem, voice change, postnasal drip, sinus pressure, tinnitus and ear discharge.    Eyes: Negative for redness, itching and visual disturbance.   Respiratory: Negative for cough, choking and wheezing.    Cardiovascular: Negative for chest pain and palpitations.   Gastrointestinal: Negative for abdominal pain.        No reflux.   Musculoskeletal: Negative for gait problem.   Skin: Negative for rash.   Neurological: Negative for dizziness, light-headedness and headaches.     Past Medical History:   Diagnosis Date    COVID-19 04/12/2022    - COVID-19 positive - counseling on management COVID-19 - COVID risk score equals 5 - Recommend Paxlovid    Diabetes mellitus     Epigastric pain 09/07/2022    Generalized abdominal pain 03/16/2022    - unknown etiology - reviewed x-ray, abdominal ultrasound and colonoscopy - recommend CT scan abdomen pelvis - recommend re-evaluation by gastroenterology    Heart murmur     MVP    Hypertension     Non-recurrent unilateral inguinal hernia without obstruction or gangrene 09/30/2021    Type 2 diabetes mellitus with foot ulcer, with long-term current use of insulin 11/09/2021    Lab Results  Component  Value  Date     HGBA1C  8.5 (H)  11/20/2023     - +PAD  - ulcer resolved    Urinary tract infection      Past  Surgical History:   Procedure Laterality Date    AORTOGRAPHY WITH SERIALOGRAPHY N/A 11/04/2021    Procedure: AORTOGRAM, WITH SERIALOGRAPHY;  Surgeon: Jeovany Hart III, MD;  Location: [facility] CATH LAB;  Service: Cardiology;  Laterality: N/A;    ARTHROSCOPY OF KNEE Right     COLONOSCOPY N/A 09/25/2020    Procedure: COLONOSCOPY;  Surgeon: Vicky Hodges MD;  Location: [facility] ENDO;  Service: Endoscopy;  Laterality: N/A;    ESOPHAGOGASTRODUODENOSCOPY N/A 09/16/2022    Procedure: EGD (ESOPHAGOGASTRODUODENOSCOPY);  Surgeon: Vicky Hodges MD;  Location: [facility] ENDO;  Service: Endoscopy;  Laterality: N/A;    HERNIA REPAIR      ROBOT-ASSISTED LAPAROSCOPIC REPAIR OF INGUINAL HERNIA Right 09/30/2021    Procedure: ROBOTIC REPAIR, HERNIA, INGUINAL;  Surgeon: Carmelo Farooq MD;  Location: [facility] OR;  Service: General;  Laterality: Right;    SPINE SURGERY      Lumbar     Social History     Socioeconomic History    Marital status:    Tobacco Use    Smoking status: Never     Passive exposure: Never    Smokeless tobacco: Never   Substance and Sexual Activity    Alcohol use: Yes     Comment: daily    Drug use: No    Sexual activity: Yes     Partners: Female   Social History Narrative    He lives in [facility]. He lives with his wife and 2 children. His children are attending [employer]. He lived [place]. . Nonsmoker. Social drinker. 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 His son attended [employer].      Social Determinants of Health     Financial Resource Strain: Low Risk  (3/29/2023)    Overall Financial Resource Strain (CARDIA)     Difficulty of Paying Living Expenses: Not hard at all   Food Insecurity: No Food Insecurity (3/29/2023)    Hunger Vital Sign     Worried About Running Out of Food in the Last Year: Never true     Ran Out of Food in the Last Year: Never true   Transportation Needs: No Transportation Needs (3/29/2023)    PRAPARE - Transportation     Lack of Transportation (Medical): No     Lack  of Transportation (Non-Medical): No   Physical Activity: Unknown (3/29/2023)    Exercise Vital Sign     Days of Exercise per Week: Patient declined   Stress: Patient Declined (3/29/2023)    [employer] [place] [employer] - Occupational Stress Questionnaire     Feeling of Stress : Patient declined   Social Connections: Unknown (3/29/2023)    Social Connection and Isolation Panel [NHANES]     Frequency of Communication with Friends and Family: More than three times a week     Frequency of Social Gatherings with Friends and Family: More than three times a week     Attends Club or Organization Meetings: More than 4 times per year     Marital Status:    Housing Stability: Low Risk  (3/29/2023)    Housing Stability Vital Sign     Unable to Pay for Housing in the Last Year: No     Number of Places Lived in the Last Year: 1     Unstable Housing in the Last Year: No     Family History   Problem Relation Age of Onset    No Known Problems Mother     No Known Problems Father     Diabetes Sister     Diabetes Brother     No Known Problems Daughter     Cancer Neg Hx     Heart disease Neg Hx     Prostate cancer Neg Hx     Kidney disease Neg Hx        Objective:      Vitals:    03/21/24 1431   BP: 129/71   Pulse: 72       Physical Exam   Constitutional: Well appearing / communicating without difficutly.  NAD.  Eyes: EOM I Bilaterally  Head/Face: Normocephalic. Negative paranasal sinus pressure/tenderness.  Salivary glands WNL.  House Brackmann I Bilaterally.     Right Ear: Auricle normal appearance. External Auditory Canal with excessive cerumen impaction. EAC with no lesions or masses,TM w/o masses/lesions/perforations. TM mobility noted.   Left Ear: Auricle normal appearance. External Auditory Canal with minimal cerumen impaction. EAC with no lesions or masses,TM w/o masses/lesions/perforations. TM mobility noted.  Nose: No gross nasal septal deviation. Inferior Turbinates 3+ bilaterally. No septal perforation. No  masses/lesions. External nasal skin appears normal without masses/lesions.   Oral Cavity: Gingiva/lips within normal limits.  Dentition/gingiva healthy appearing. Mucus membranes moist. Floor of mouth soft, no masses palpated. Oral Tongue mobile. Hard Palate appears normal.    Oropharynx: Base of tongue appears normal. No masses/lesions noted. Tonsillar fossa/pharyngeal wall without lesions. Posterior oropharynx WNL.  Soft palate without masses. Midline uvula.   Neck/Lymphatic: No LAD I-VI bilaterally.  No thyromegaly.  No masses noted on exam.     Mirror laryngoscopy/nasopharyngoscopy: Active gag reflex.  Unable to perform.     Neuro/Psychiatric: AOx3.  Normal mood and affect.   Cardiovascular: Normal carotid pulses bilaterally, no increasing jugular venous distention noted at cervical region bilaterally.    Respiratory: Normal respiratory effort, no stridor, no retractions noted.      Cerumen removal under binocular microscopy   Ear Cerumen Removal    Date/Time: 3/21/2024 3:00 PM    Performed by: Dominique Lennon NP  Authorized by: Dominique Lennon NP    Consent Done?:  Yes (Verbal)    Local anesthetic:  None  Location details:  Both ears (suction)  Procedure type: curette    Cerumen  Removal Results:  Cerumen completely removed  Patient tolerance:  Patient tolerated the procedure well with no immediate complications    Audiogram interpreted personally by me and discussed in detail with the patient today.   Pure tone testing revealed normal hearing at 250-2000 Hz sloping to moderate sensorineural hearing loss, bilaterally. Speech reception thresholds were obtained at 10 dBHL in the right ear and 10 dBHL in the left ear. Speech discrimination scores were 92% in the right ear and 80% in the left ear. Tympanometry revealed Type A  tympanograms in both ears.       Assessment:         ICD-10-CM ICD-9-CM    1. Bilateral impacted cerumen  H61.23 380.4 Ear Cerumen Removal      2. Sensorineural hearing loss (SNHL) of  both ears  H90.3 389.18            Plan:       -  Cerumen impaction:  Removed under microscopy today without difficulty.  I would recommend the use of a wax softening drop, either over the counter Debrox or mineral oil, on a weekly basis.  I also instructed the patient to avoid Qtips.  - We reviewed the patient's recent audiogram and hearing loss in detail.   We also discussed the use hearing protection when exposed to loud noise, including lawn equipment. Recommend audiogram in 1 year.    -f/u 1 year or sooner as needed        Dominique Lennon, NP

## 2024-03-21 NOTE — PROCEDURES
Ear Cerumen Removal    Date/Time: 3/21/2024 3:00 PM    Performed by: Dominique Lennon NP  Authorized by: Dominique Lennon NP    Consent Done?:  Yes (Verbal)    Local anesthetic:  None  Location details:  Both ears (suction)  Procedure type: curette    Cerumen  Removal Results:  Cerumen completely removed  Patient tolerance:  Patient tolerated the procedure well with no immediate complications

## 2024-03-21 NOTE — PROGRESS NOTES
Sj Gonzalez, a 68 y.o. male was seen today in the clinic for an audiologic evaluation after ear cleaning. The patient's primary complaint was ear pain and impacted cerumen.  Mr. Gonzalez denies history of noise exposure. He denies tinnitus, drainage, ear pain and dizziness.    Pure tone testing revealed normal hearing at 250-2000 Hz sloping to moderate sensorineural hearing loss, bilaterally. Speech reception thresholds were obtained at 10 dBHL in the right ear and 10 dBHL in the left ear. Speech discrimination scores were 92% in the right ear and 80% in the left ear. Tympanometry revealed Type A  tympanograms in both ears.    Results were reviewed with the patient and hearing aid consultation was discussed.    Recommendations:  Otologic evaluation  Annual audiologic evaluation  Hearing protection in noise  Hearing aid consultation when patient is ready to consider amplification.

## 2024-03-27 ENCOUNTER — OFFICE VISIT (OUTPATIENT)
Dept: DERMATOLOGY | Facility: CLINIC | Age: 69
End: 2024-03-27
Payer: MEDICARE

## 2024-03-27 DIAGNOSIS — L30.9 DERMATITIS, UNSPECIFIED: ICD-10-CM

## 2024-03-27 DIAGNOSIS — Z12.83 SCREENING EXAM FOR SKIN CANCER: ICD-10-CM

## 2024-03-27 DIAGNOSIS — D22.9 MULTIPLE BENIGN NEVI: ICD-10-CM

## 2024-03-27 DIAGNOSIS — L73.9 FOLLICULITIS: Primary | ICD-10-CM

## 2024-03-27 DIAGNOSIS — L82.1 SEBORRHEIC KERATOSES: ICD-10-CM

## 2024-03-27 PROCEDURE — 99203 OFFICE O/P NEW LOW 30 MIN: CPT | Mod: S$PBB,,, | Performed by: STUDENT IN AN ORGANIZED HEALTH CARE EDUCATION/TRAINING PROGRAM

## 2024-03-27 PROCEDURE — 99213 OFFICE O/P EST LOW 20 MIN: CPT | Mod: PBBFAC | Performed by: STUDENT IN AN ORGANIZED HEALTH CARE EDUCATION/TRAINING PROGRAM

## 2024-03-27 PROCEDURE — 99999 PR PBB SHADOW E&M-EST. PATIENT-LVL III: CPT | Mod: PBBFAC,,, | Performed by: STUDENT IN AN ORGANIZED HEALTH CARE EDUCATION/TRAINING PROGRAM

## 2024-03-27 RX ORDER — CLINDAMYCIN PHOSPHATE 10 UG/ML
LOTION TOPICAL
Qty: 60 ML | Refills: 2 | Status: SHIPPED | OUTPATIENT
Start: 2024-03-27

## 2024-03-27 NOTE — PROGRESS NOTES
Subjective:      Patient ID:  Sj Gonzalez is a 68 y.o. male who presents for   Chief Complaint   Patient presents with    Skin Check     Right and left arms/hands     Sj Gonzalez is a 68 y.o. male who presents for: UBSE screening exam for skin cancer, blisters    New patient    The patient has the following lesions of concern:  Location: right and left arms/hands  Duration: couple of years  Symptoms: blistering - occasionally drainage   Relieving factors/Previous treatments: none   Not itching    Patient with new complaint of lesion(s)  Location: left thigh  Duration: few years   Symptoms: none   Relieving factors/Previous treatments: none     Pertinent history:  History of blistering sunburns: No  History of tanning bed use: No  Family history of melanoma: No  Personal history of mole removal: No  Personal history of skin cancer: No      Review of Systems   Skin:  Positive for activity-related sunscreen use (sometimes). Negative for daily sunscreen use and recent sunburn.   Hematologic/Lymphatic: Bruises/bleeds easily (bruise).       Objective:   Physical Exam   Constitutional: He appears well-developed and well-nourished. No distress.   Neurological: He is alert and oriented to person, place, and time. He is not disoriented.   Psychiatric: He has a normal mood and affect.   Skin:   Areas Examined (abnormalities noted in diagram):   Scalp / Hair Palpated and Inspected  Head / Face Inspection Performed  Neck Inspection Performed  Chest / Axilla Inspection Performed  Abdomen Inspection Performed  Back Inspection Performed  RUE Inspected  LUE Inspection Performed            Diagram Legend     Erythematous scaling macule/papule c/w actinic keratosis       Vascular papule c/w angioma      Pigmented verrucoid papule/plaque c/w seborrheic keratosis      Yellow umbilicated papule c/w sebaceous hyperplasia      Irregularly shaped tan macule c/w lentigo     1-2 mm smooth white papules consistent with Milia      Movable  subcutaneous cyst with punctum c/w epidermal inclusion cyst      Subcutaneous movable cyst c/w pilar cyst      Firm pink to brown papule c/w dermatofibroma      Pedunculated fleshy papule(s) c/w skin tag(s)      Evenly pigmented macule c/w junctional nevus     Mildly variegated pigmented, slightly irregular-bordered macule c/w mildly atypical nevus      Flesh colored to evenly pigmented papule c/w intradermal nevus       Pink pearly papule/plaque c/w basal cell carcinoma      Erythematous hyperkeratotic cursted plaque c/w SCC      Surgical scar with no sign of skin cancer recurrence      Open and closed comedones      Inflammatory papules and pustules      Verrucoid papule consistent consistent with wart     Erythematous eczematous patches and plaques     Dystrophic onycholytic nail with subungual debris c/w onychomycosis     Umbilicated papule    Erythematous-base heme-crusted tan verrucoid plaque consistent with inflamed seborrheic keratosis     Erythematous Silvery Scaling Plaque c/w Psoriasis     See annotation      Assessment / Plan:      Folliculitis  Back, recommend BP 10% Panoxyl wash daily in shower (OTC medication)    Dermatitis, unspecified  Pt has scattered erosions on erythematous base on dorsal hands, elbows  Non pruritic - present x 1 year per patient he is wondering if related to diabetes  I discussed I am unclear on etiology of these blisters, I would consider DH or BPif they were itchy  Offered biopsy to help aid in diagnosis, pt declines today  I recommend vaseline or aquaphor twice daily to open cuts to help heal. He may want to apply every day to arms/hands for prevention  Wife over phone says clindamycin worked for him in the past for this condition- sent clindamycin lotion to try    Seborrheic keratoses  These are benign inherited growths without a malignant potential. Reassurance given to patient. No treatment is necessary.     Screening exam for skin cancer  Upper body skin examination  performed today including at least 6 points as noted in physical examination. No lesions suspicious for malignancy noted.    Recommend daily sun protection/avoidance and use of at least SPF 30, broad spectrum sunscreen (OTC drug).     RTC 1 year, sooner prn

## 2024-04-29 NOTE — PROGRESS NOTES
Subjective:       Sj Gonzalez is a 68 y.o. male who is a new patient who was referred by Dr. Idalia Alexandra  for evaluation of low T.      Previous pt of Dr Interiano for ED and low T. Was on TRT (200mg q2w) - last given by outside clinic about 18 months. Worked well when on TRT. Cialis 20mg PRN, Trimix for ED. Has been Cialis 5mg daily (not currently taking, has active rx). Has also seen Dr Riddle for low T. Returns now with low T though last value was low normal. Prior levels in 200 range.     T:  6/22 - 228  11/23 - 231  2/24 - 318    PSA 10/23 - 0.43    The following portions of the patient's history were reviewed and updated as appropriate: allergies, current medications, past family history, past medical history, past social history, past surgical history and problem list.    Review of Systems  Twelve point review of systems completed. Pertinent positive and negatives listed in HPI.      Objective:    Vitals: BP (!) 140/74 (BP Location: Right arm, Patient Position: Sitting, BP Method: Large (Automatic))   Pulse 77   Resp 12   Ht 6' (1.829 m)   Wt 107 kg (235 lb 14.3 oz)   SpO2 100%   BMI 31.99 kg/m²     Physical Exam   General: well developed, well nourished in no acute distress  Head: normocephalic, atraumatic  Neck: no obvious enlargement of thyroid  HEENT: EOMI, mucus membranes moist, sclera anicteric, no hearing impairment  Lungs: symmetric expansion, non-labored breathing  Neuro: alert and oriented x 3, no gross deficits  Psych: normal judgment and insight, normal mood/affect and non-anxious  Genitourinary:   deferred   JANNET: deferred      Lab Review   Urine analysis today in clinic shows no urine     Lab Results   Component Value Date    WBC 7.23 02/20/2024    HGB 14.6 02/20/2024    HCT 41.6 02/20/2024    MCV 94 02/20/2024     02/20/2024     Lab Results   Component Value Date    CREATININE 0.89 02/20/2024    BUN 15 02/20/2024     Lab Results   Component Value Date    PSA 0.43 10/31/2023  "    No results found for: "PSADIAG"    Imaging  NA         Assessment/Plan:      1. Hypogonadism male     - Symptomatic low T   - Prior TRT. Did well with injections. Comfortable with self-administering.    - Start T inj 200mg [id number]   - Labs 3-4 months     - Discussed hypogonadism, common symptoms, treatment options, and the risks and benefits of treatment.  His symptoms and blood tests support the diagnosis and therefore treatment is appropriate.   - Discussed the various risks associated with TRT, specifically a possible increase in risk of heart disease, MI, CVA, PE, DVT. Also discussed the issues related to testosterone replacement and prostate cancer. TRT does not increase his risk of developing cancer. Recommend annual JANNET and PSA for PCa screening. TRT recommended to stop if abnormal JANNET or elevated PSA.   - Treatment options reviewed: regular injections, topical treatments including gels and creams, and implants such as TestoPel.  Risks and benefits of each were reviewed specifically T fluctuations with q2-4week injections and risk of transfer of medication to other with topical application.       2. Low testosterone    - Restart TRT     3. Erectile dysfunction due to arterial insufficiency    - On Cialis 5mg daily   - Previously on Trimix PRN   - Discussed TRT impact on ED       Follow up in 4 months with labs         "

## 2024-05-01 ENCOUNTER — OFFICE VISIT (OUTPATIENT)
Dept: OPTOMETRY | Facility: CLINIC | Age: 69
End: 2024-05-01
Payer: MEDICARE

## 2024-05-01 ENCOUNTER — OFFICE VISIT (OUTPATIENT)
Dept: UROLOGY | Facility: CLINIC | Age: 69
End: 2024-05-01
Attending: FAMILY MEDICINE
Payer: MEDICARE

## 2024-05-01 VITALS
BODY MASS INDEX: 31.95 KG/M2 | OXYGEN SATURATION: 100 % | HEIGHT: 72 IN | HEART RATE: 77 BPM | DIASTOLIC BLOOD PRESSURE: 74 MMHG | RESPIRATION RATE: 12 BRPM | SYSTOLIC BLOOD PRESSURE: 140 MMHG | WEIGHT: 235.88 LBS

## 2024-05-01 DIAGNOSIS — H35.81 CWS (COTTON WOOL SPOTS): ICD-10-CM

## 2024-05-01 DIAGNOSIS — E11.69 HYPERLIPIDEMIA ASSOCIATED WITH TYPE 2 DIABETES MELLITUS: ICD-10-CM

## 2024-05-01 DIAGNOSIS — E11.59 HYPERTENSION ASSOCIATED WITH TYPE 2 DIABETES MELLITUS: ICD-10-CM

## 2024-05-01 DIAGNOSIS — H52.4 PRESBYOPIA: ICD-10-CM

## 2024-05-01 DIAGNOSIS — H25.13 NS (NUCLEAR SCLEROSIS), BILATERAL: ICD-10-CM

## 2024-05-01 DIAGNOSIS — E78.5 HYPERLIPIDEMIA ASSOCIATED WITH TYPE 2 DIABETES MELLITUS: ICD-10-CM

## 2024-05-01 DIAGNOSIS — E11.3299 NPDR (NONPROLIFERATIVE DIABETIC RETINOPATHY): ICD-10-CM

## 2024-05-01 DIAGNOSIS — E29.1 HYPOGONADISM MALE: Primary | ICD-10-CM

## 2024-05-01 DIAGNOSIS — Z79.4 TYPE 2 DIABETES MELLITUS WITH HYPERGLYCEMIA, WITH LONG-TERM CURRENT USE OF INSULIN: Primary | ICD-10-CM

## 2024-05-01 DIAGNOSIS — N42.9 DISORDER OF PROSTATE, UNSPECIFIED: ICD-10-CM

## 2024-05-01 DIAGNOSIS — N52.01 ERECTILE DYSFUNCTION DUE TO ARTERIAL INSUFFICIENCY: ICD-10-CM

## 2024-05-01 DIAGNOSIS — R79.89 LOW TESTOSTERONE: ICD-10-CM

## 2024-05-01 DIAGNOSIS — E11.52 TYPE 2 DIABETES MELLITUS WITH DIABETIC PERIPHERAL ANGIOPATHY AND GANGRENE, WITHOUT LONG-TERM CURRENT USE OF INSULIN: ICD-10-CM

## 2024-05-01 DIAGNOSIS — E11.65 TYPE 2 DIABETES MELLITUS WITH HYPERGLYCEMIA, WITH LONG-TERM CURRENT USE OF INSULIN: Primary | ICD-10-CM

## 2024-05-01 DIAGNOSIS — I15.2 HYPERTENSION ASSOCIATED WITH TYPE 2 DIABETES MELLITUS: ICD-10-CM

## 2024-05-01 DIAGNOSIS — H52.03 HYPEROPIA, BILATERAL: ICD-10-CM

## 2024-05-01 PROCEDURE — 99214 OFFICE O/P EST MOD 30 MIN: CPT | Mod: S$PBB,,, | Performed by: OPTOMETRIST

## 2024-05-01 PROCEDURE — 92015 DETERMINE REFRACTIVE STATE: CPT | Mod: ,,, | Performed by: OPTOMETRIST

## 2024-05-01 PROCEDURE — 99213 OFFICE O/P EST LOW 20 MIN: CPT | Mod: PBBFAC,PO | Performed by: OPTOMETRIST

## 2024-05-01 PROCEDURE — 99204 OFFICE O/P NEW MOD 45 MIN: CPT | Mod: S$GLB,,, | Performed by: UROLOGY

## 2024-05-01 PROCEDURE — 99999 PR PBB SHADOW E&M-EST. PATIENT-LVL III: CPT | Mod: PBBFAC,,, | Performed by: OPTOMETRIST

## 2024-05-01 RX ORDER — TESTOSTERONE CYPIONATE 200 MG/ML
200 INJECTION, SOLUTION INTRAMUSCULAR
Qty: 5 ML | Refills: 5 | Status: SHIPPED | OUTPATIENT
Start: 2024-05-01 | End: 2025-06-25

## 2024-05-01 RX ORDER — SYRINGE W-NEEDLE,DISPOSAB,3 ML 25GX5/8"
1 SYRINGE, EMPTY DISPOSABLE MISCELLANEOUS
Qty: 100 EACH | Refills: 2 | Status: SHIPPED | OUTPATIENT
Start: 2024-05-01

## 2024-05-01 NOTE — Clinical Note
Please schedule labs to be done at same time as next labs (8/26/24). Please also schedule f/u appt to see me about 1-2 weeks after labs. Thanks.

## 2024-05-02 ENCOUNTER — TELEPHONE (OUTPATIENT)
Dept: OPHTHALMOLOGY | Facility: CLINIC | Age: 69
End: 2024-05-02
Payer: MEDICARE

## 2024-05-02 NOTE — TELEPHONE ENCOUNTER
----- Message from Kristin Almodovar, OD sent at 5/2/2024  8:32 AM CDT -----  Please call pt to schedule dm/htn retinopathy cecy  Called pt to [facility] appt.

## 2024-05-02 NOTE — PROGRESS NOTES
HPI     dm       dfe        annual eye exam ou      Additional comments: Dfe   Dm   Last BS    300 TO 63  Last A1c    UNSURE     Blurred va ou            Comments    67 Yo male is here for routine eye exam wih no C/o about ocular health   Pt denies pain and discomfort   No f/f  Dls  06/2022  Eye med: no gtt          Last edited by Laura Dowell on 5/1/2024  3:11 PM.            Assessment /Plan     For exam results, see Encounter Report.          Type 2 diabetes mellitus with hyperglycemia, with long-term current use of insulin  Type 2 diabetes mellitus without ophthalmic manifestations  Essential hypertension             NPDR OU   CWS OS              Discussed importance of A1c control    Consult retina for eval    NS (nuclear sclerosis), bilateral              Mild, monitor     Presbyopia  Hyperopia, bilateral              Rx specs: discussed MF vs PAL    RTC 1 year, sooner PRN

## 2024-05-05 NOTE — TELEPHONE ENCOUNTER
Spoke with pt stated message, pt states he will call back and schedule he needs to check with his wife as to which  He will be going to.   
n/a

## 2024-05-06 NOTE — LETTER
May 29, 2018      Vicky Middleton, CRYSTAL  [address]  [place] [address]           [facility]  [address] [place] [address] [address]  [place] [address]  Phone: [phone]  Fax: [phone]          Patient: Sj Gonzalez   MR Number: [id number]   YOB: 1955   Date of Visit: 5/29/2018       Dear Vicky Middleton:    Thank you for referring Sj Gonzalez to me for evaluation. Attached you will find relevant portions of my assessment and plan of care.    If you have questions, please do not hesitate to call me. I look forward to following Sj Gonzalez along with you.    Sincerely,    Roman Interiano MD    Enclosure  CC:  Micaela Silva MD    If you would like to receive this communication electronically, please contact [email or web address] or [phone] to request more information on [facility] Link access.    For providers and/or their staff who would like to refer a patient to [facility], please contact us through our one-stop-shop provider referral line, [facility], at [phone].    If you feel you have received this communication in error or would no longer like to receive these types of communications, please e-mail [email or web address]         
[[email or web address]_ALL_OB_FT:[email or web address]],[NS_DeliveryRN_OBGYN_ALL_OB_FT:[id number]]

## 2024-06-19 NOTE — TELEPHONE ENCOUNTER
Detail Level: Detailed I spoke to the pharmacy ([facility]) and I was able to call in the prescription for the patient. I also reached out to the patient in regards to his medications. Patient has voiced understanding.

## 2024-06-24 DIAGNOSIS — E11.65 TYPE 2 DIABETES MELLITUS WITH HYPERGLYCEMIA, WITH LONG-TERM CURRENT USE OF INSULIN: ICD-10-CM

## 2024-06-24 DIAGNOSIS — E11.52 TYPE 2 DIABETES MELLITUS WITH DIABETIC PERIPHERAL ANGIOPATHY AND GANGRENE, WITHOUT LONG-TERM CURRENT USE OF INSULIN: ICD-10-CM

## 2024-06-24 DIAGNOSIS — Z79.4 TYPE 2 DIABETES MELLITUS WITH HYPERGLYCEMIA, WITH LONG-TERM CURRENT USE OF INSULIN: ICD-10-CM

## 2024-06-24 NOTE — TELEPHONE ENCOUNTER
No care due was identified.  [facility] Embedded Care Due Messages. Reference number: [id number].   6/24/2024 4:58:02 PM CDT

## 2024-06-24 NOTE — TELEPHONE ENCOUNTER
Care Due:                  Date            Visit Type   Department     Provider  --------------------------------------------------------------------------------                                ESTABLISHED                              PATIENT -    [facility]  Last Visit: 02-      VIRTUAL      CARE           Idalia Alexandra  Next Visit: None Scheduled  None         None Found                                                            Last  Test          Frequency    Reason                     Performed    Due Date  --------------------------------------------------------------------------------    HBA1C.......  6 months...  insulin, semaglutide.....  02- 08-    [facility] Embedded Care Due Messages. Reference number: [id number].   6/24/2024 4:51:45 PM CDT

## 2024-06-25 RX ORDER — SEMAGLUTIDE 2.68 MG/ML
2 INJECTION, SOLUTION SUBCUTANEOUS
Qty: 9 ML | Refills: 1 | OUTPATIENT
Start: 2024-06-25 | End: 2024-09-23

## 2024-06-25 NOTE — TELEPHONE ENCOUNTER
Refill Encounter    PCP Visits: Recent Visits  Date Type Provider Dept   02/28/24 Office Visit Idalia Alexandra, DO [facility]   11/27/23 Office Visit Idalia Alexandra, DO [facility]   Showing recent visits within past 360 days and meeting all other requirements  Future Appointments  Date Type Provider Dept   08/28/24 Appointment Idalia Alexandra,  [facility]   Showing future appointments within next 720 days and meeting all other requirements     Last 3 Blood Pressure:   BP Readings from Last 3 Encounters:   05/01/24 (!) 140/74   03/21/24 129/71   02/28/24 132/80     Preferred Pharmacy:   [id number] - [place] - [address]  [address]  [place] [address]  Phone: [phone] Fax: [phone]    [id number] - [place] - [address] AT [address] [facility]  [address]  [place] [address]  Phone: [phone] Fax: [phone]    [facility] - [place] - [facility] AT Portal to Registered [facility]  [place] [address]  Phone: [phone] Fax: [phone]    [facility]  [address]  [place] [address]  Phone: [phone] Fax: [phone]    [facility]  [address]  [place] [address]  Phone: [phone] Fax: [phone]    Requested RX:  Requested Prescriptions     Pending Prescriptions Disp Refills    semaglutide (OZEMPIC) 2 mg/dose (8 mg/3 mL) PnIj 9 mL 3     Sig: Inject 2 mg into the skin every 7 days.      RX Route: Normal

## 2024-07-08 ENCOUNTER — OFFICE VISIT (OUTPATIENT)
Dept: URGENT CARE | Facility: CLINIC | Age: 69
End: 2024-07-08
Payer: MEDICARE

## 2024-07-08 VITALS
HEART RATE: 72 BPM | HEIGHT: 72 IN | DIASTOLIC BLOOD PRESSURE: 88 MMHG | WEIGHT: 235 LBS | RESPIRATION RATE: 14 BRPM | TEMPERATURE: 99 F | OXYGEN SATURATION: 97 % | BODY MASS INDEX: 31.83 KG/M2 | SYSTOLIC BLOOD PRESSURE: 148 MMHG

## 2024-07-08 DIAGNOSIS — H60.503 ACUTE OTITIS EXTERNA OF BOTH EARS, UNSPECIFIED TYPE: Primary | ICD-10-CM

## 2024-07-08 PROCEDURE — 99213 OFFICE O/P EST LOW 20 MIN: CPT | Mod: S$GLB,,, | Performed by: FAMILY MEDICINE

## 2024-07-08 RX ORDER — AMOXICILLIN AND CLAVULANATE POTASSIUM 875; 125 MG/1; MG/1
1 TABLET, FILM COATED ORAL 2 TIMES DAILY
Qty: 20 TABLET | Refills: 0 | Status: SHIPPED | OUTPATIENT
Start: 2024-07-08 | End: 2024-07-18

## 2024-07-08 RX ORDER — NEOMYCIN SULFATE, POLYMYXIN B SULFATE, HYDROCORTISONE 3.5; 10000; 1 MG/ML; [USP'U]/ML; MG/ML
3 SOLUTION/ DROPS AURICULAR (OTIC) 3 TIMES DAILY
Qty: 10 ML | Refills: 0 | Status: SHIPPED | OUTPATIENT
Start: 2024-07-08 | End: 2024-07-18

## 2024-07-08 NOTE — PROGRESS NOTES
Subjective:      Patient ID: Sj Gonzalez is a 69 y.o. male.    Vitals:  height is 6' (1.829 m) and weight is 106.6 kg (235 lb). His oral temperature is 98.7 °F (37.1 °C). His blood pressure is 148/88 (abnormal) and his pulse is 72. His respiration is 14 and oxygen saturation is 97%.     Chief Complaint: Otalgia    Pt complain of left ear pain that started 1 week ago. Pt did not take anything for relief.    Otalgia   There is pain in the left ear. This is a new problem. The current episode started in the past 7 days. The problem occurs constantly. The problem has been gradually worsening. There has been no fever. The pain is at a severity of 8/10. The pain is moderate. Associated symptoms include hearing loss. Pertinent negatives include no abdominal pain, coughing, diarrhea, ear discharge, headaches, neck pain, rash, rhinorrhea, sore throat or vomiting. He has tried nothing for the symptoms. The treatment provided no relief. There is no history of a chronic ear infection, hearing loss or a tympanostomy tube.       HENT:  Positive for ear pain and hearing loss. Negative for ear discharge and sore throat.    Neck: Negative for neck pain.   Respiratory:  Negative for cough.    Gastrointestinal:  Negative for abdominal pain, vomiting and diarrhea.   Skin:  Negative for rash.   Neurological:  Negative for headaches.      Objective:     Physical Exam   Constitutional: He does not appear ill. No distress. obesity  HENT:   Head: Normocephalic and atraumatic.   Ears:   Right Ear: Hearing normal. There is swelling (canal edematous bilaterally - erytgematous, L>R). Tympanic membrane is not injected.   Left Ear: Hearing normal. There is drainage and swelling. Tympanic membrane is injected (poor;y visualized).   Nose: Nose normal.   Cardiovascular: Normal rate, normal heart sounds and normal pulses.   Abdominal: Normal appearance.   Neurological: He is alert.   Nursing note and vitals reviewed.    Assessment:     1. Acute  otitis externa of both ears, unspecified type        Plan:       Acute otitis externa of both ears, unspecified type  -     neomycin-polymyxin-hydrocortisone (CORTISPORIN) otic solution; Place 3 drops into both ears 3 (three) times daily. for 10 days  Dispense: 10 mL; Refill: 0  -     amoxicillin-clavulanate 875-125mg (AUGMENTIN) 875-125 mg per tablet; Take 1 tablet by mouth 2 (two) times daily. for 10 days  Dispense: 20 tablet; Refill: 0

## 2024-07-24 ENCOUNTER — TELEPHONE (OUTPATIENT)
Dept: UROLOGY | Facility: CLINIC | Age: 69
End: 2024-07-24
Payer: MEDICARE

## 2024-07-24 RX ORDER — SYRINGE W-NEEDLE,DISPOSAB,3 ML 25GX5/8"
1 SYRINGE, EMPTY DISPOSABLE MISCELLANEOUS WEEKLY
Qty: 100 EACH | Refills: 1 | Status: SHIPPED | OUTPATIENT
Start: 2024-07-24

## 2024-07-24 NOTE — TELEPHONE ENCOUNTER
"----- Message from Reza Bansal MA sent at 7/24/2024  1:41 PM CDT -----    ----- Message -----  From: Yenny Ly  Sent: 7/24/2024   1:19 PM CDT  To: [facility] Staff    Name of Who is Calling:Jonn calling from the pharmacy                 What is the request in detail: Jonn calling because they dont have gauge syringe with needle (SYRINGE 3CC/22GX1") 3 mL 22 gauge x 1" Syrg, , but states she have 21 and what to know if it can be changed.Please call back to further assist.                 Can the clinic reply by [facility]: No                [phone]  "

## 2024-08-22 NOTE — PROGRESS NOTES
"FAMILY MEDICINE  [facility]    Reason for visit:   Chief Complaint   Patient presents with    Annual Exam    Hypertension    Diabetes    Hyperlipidemia         SUBJECTIVE: Sj Gonzalez is a 69 y.o. male  - obesity, hypertension, hyperlipidemia, type 2 diabetes, lumbar degenerative disc disease, and peripheral arterial disease presents annual and f/u DM2, HTN and labs     Cardiology: Dr. Jeovany Hart   Endocrinology Dr. Castillo Guzman (no longer seeing)  Podiatry Dr. Zepeda  Gastroenterology: Dr. Hodges  Urology Dr. Bianka Hernandez        1. Diabetes Type 2     Endocrinology Dr. Guzman (no longer seeing)    Sj Gonzalez unfortunately in the 'donut hole" and Ozempic not affordable. He has not had his medication and glucose has been worsening.      Current Outpatient Medications  flash glucose scanning reader (FREESTYLE JAELYN 14 DAY READER) Misc, use once daily, Disp: 1 each, Rfl: 0  flash glucose sensor (FREESTYLE JAELYN 14 DAY SENSOR) Kit, use and change every 14 days, Disp: 2 kit, Rfl: 11  insulin (BASAGLAR KWIKPEN U-100 INSULIN) glargine 100 units/mL SubQ pen, Inject 35 Units into the skin once  - only taking 30 units daily  semaglutide (OZEMPIC) 2 mg/dose (8 mg/3 mL) PnIj, Inject 2 mg into the skin every 7 days., Disp: 9 mL, Rfl: 1  - not taking due to cost    Side effects from treatment:  denies      Prior medication:   Metformin (stomach cramps)  Trulicity (too expensive)   Glipizide (discontinue started Trulicity with Basaglar)  Pioglitazone (cost)    Complications of diabetes: history of diabetic foot ulcer healed; PAD     Glucometer: FreeStyle Jaelyn 3  Glucose monitoring: premeal and bedtime    Lab Results       Component                Value               Date                       HGBA1C                   7.7 (H)             08/26/2024              Lab Results       Component                Value               Date                       HGBA1C                   6.9 (H)            "  02/20/2024              Low dose statin: Rosuvastatin   Last eye exam: 5/2/24  Last foot exam: 11/27/23     Vaccines:   Influenza:  Due each flu season and pt declined  Prevnar 20: recommended and he declines  Covid-19 recommended and he declines     2. Hyperlipidemia  - aortic atherosclerosis, PAD with history of ulcer  - LDL goal < 70    Current treatment:  rosuvastatin (CRESTOR) 20 MG tablet, Take 1 tablet (20 mg total) by mouth once daily., Disp: 90 tablet, Rfl: 3  - not taking    Side effects from current treatment: none    Lab Results       Component                Value               Date                       CHOL                     147                 08/26/2024                 CHOL                     138                 02/20/2024                 CHOL                     141                 12/12/2022            Lab Results       Component                Value               Date                       HDL                      56                  08/26/2024                 HDL                      40                  02/20/2024                 HDL                      47                  12/12/2022            Lab Results       Component                Value               Date                       LDLCALC                  78.6                08/26/2024                 LDLCALC                  85.0                02/20/2024                 LDLCALC                  83.8                12/12/2022            Lab Results       Component                Value               Date                       TRIG                     62                  08/26/2024                 TRIG                     65                  02/20/2024                 TRIG                     51                  12/12/2022              Lab Results       Component                Value               Date                       CHOLHDL                  38.1                08/26/2024                 CHOLHDL                  29.0                 02/20/2024                 CHOLHDL                  33.3                12/12/2022              3. Hypertension  - Comorbid issues: HTN, DM2, PAD (with history of ulcer)  - BP goal < 130/80    Sj Gonzalez reports that his cardiologist just started Irbesartan for his BP but he is still taking losartan/HCTZ. He does not recall that his cardiologist told him to stop this medication.     Current medication treatment:   amLODIPine (NORVASC) 10 MG tablet, Take 1 tablet (10 mg total) by mouth once daily., Disp: 90 tablet, Rfl: 3  losartan-hydrochlorothiazide 100-25 mg (HYZAAR) 100-25 mg per tablet, Take 1 tablet by mouth once daily., Disp: 90 tablet, Rfl: 3  Irebesartan 300 mg daily 1/2 daily    Medication side effects: denies    Exercise regimen: none    Home BP cuff: Yes  How often does patient monitoring BP? 130-150/80-90's              Review of Systems   All other systems reviewed and are negative.      HEALTH MAINTENANCE:   Health Maintenance   Topic Date Due    Aspirin/Antiplatelet Therapy  Never done    Shingles Vaccine (1 of 2) Never done    Foot Exam  11/27/2024    Hemoglobin A1c  02/26/2025    Eye Exam  05/02/2025    PROSTATE-SPECIFIC ANTIGEN  08/26/2025    Lipid Panel  08/26/2025    High Dose Statin  08/28/2025    Colorectal Cancer Screening  09/25/2030    TETANUS VACCINE  06/07/2032    Hepatitis C Screening  Completed     Health Maintenance Topics with due status: Not Due       Topic Last Completion Date    Colorectal Cancer Screening 09/25/2020    Influenza Vaccine 11/04/2020    TETANUS VACCINE 06/07/2022    Foot Exam 11/27/2023    Diabetes Urine Screening 02/20/2024    Eye Exam 05/02/2024    PROSTATE-SPECIFIC ANTIGEN 08/26/2024    Lipid Panel 08/26/2024    Hemoglobin A1c 08/26/2024    High Dose Statin 08/28/2024     Health Maintenance Due   Topic Date Due    Pneumococcal Vaccines (Age 65+) (1 of 2 - PCV) Never done    Aspirin/Antiplatelet Therapy  Never done    Shingles Vaccine (1 of 2) Never  done    RSV Vaccine (Age 60+ and Pregnant patients) (1 - 1-dose 60+ series) Never done    COVID-19 Vaccine (1 - 2023-24 season) Never done       HISTORY:   Past Medical History:   Diagnosis Date    COVID-19 04/12/2022    - COVID-19 positive - counseling on management COVID-19 - COVID risk score equals 5 - Recommend Paxlovid    Diabetes mellitus     Epigastric pain 09/07/2022    Generalized abdominal pain 03/16/2022    - unknown etiology - reviewed x-ray, abdominal ultrasound and colonoscopy - recommend CT scan abdomen pelvis - recommend re-evaluation by gastroenterology    Heart murmur     MVP    Hypertension     Non-recurrent unilateral inguinal hernia without obstruction or gangrene 09/30/2021    Type 2 diabetes mellitus with foot ulcer, with long-term current use of insulin 11/09/2021    Lab Results  Component  Value  Date     HGBA1C  8.5 (H)  11/20/2023     - +PAD  - ulcer resolved    Urinary tract infection        Past Surgical History:   Procedure Laterality Date    AORTOGRAPHY WITH SERIALOGRAPHY N/A 11/04/2021    Procedure: AORTOGRAM, WITH SERIALOGRAPHY;  Surgeon: Jeovany Hart III, MD;  Location: [facility] CATH LAB;  Service: Cardiology;  Laterality: N/A;    ARTHROSCOPY OF KNEE Right     COLONOSCOPY N/A 09/25/2020    Procedure: COLONOSCOPY;  Surgeon: Vicky Hodges MD;  Location: [facility] ENDO;  Service: Endoscopy;  Laterality: N/A;    ESOPHAGOGASTRODUODENOSCOPY N/A 09/16/2022    Procedure: EGD (ESOPHAGOGASTRODUODENOSCOPY);  Surgeon: Vicky Hodges MD;  Location: [facility] ENDO;  Service: Endoscopy;  Laterality: N/A;    HERNIA REPAIR      ROBOT-ASSISTED LAPAROSCOPIC REPAIR OF INGUINAL HERNIA Right 09/30/2021    Procedure: ROBOTIC REPAIR, HERNIA, INGUINAL;  Surgeon: Carmelo Farooq MD;  Location[facility] OR;  Service: General;  Laterality: Right;    SPINE SURGERY      Lumbar       Family History   Problem Relation Name Age of Onset    No Known Problems Mother      No Known Problems  "Father      Diabetes Sister 3     Diabetes Brother 1     No Known Problems Daughter 1     Cancer Neg Hx      Heart disease Neg Hx      Prostate cancer Neg Hx      Kidney disease Neg Hx         Social History     Tobacco Use    Smoking status: Never     Passive exposure: Never    Smokeless tobacco: Never   Substance Use Topics    Alcohol use: Yes     Comment: daily    Drug use: No       Social History     Social History Narrative    He lives in [facility]. He lives with his wife and 2 children. His children are attending [employer]. He lived [place]. . Nonsmoker. Social drinker. 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 His son attended [employer].        ALLERGIES:   Review of patient's allergies indicates:   Allergen Reactions    Metformin      GI intolerance    Lisinopril        MEDS:   Current Outpatient Medications on File Prior to Visit   Medication Sig Dispense Refill Last Dose    amLODIPine (NORVASC) 10 MG tablet Take 1 tablet (10 mg total) by mouth once daily. 90 tablet 3 Taking    flash glucose scanning reader (FREESTYLE MAKI 14 DAY READER) Misc use once daily 1 each 0 Taking    flash glucose sensor (FREESTYLE MAKI 14 DAY SENSOR) Kit use and change every 14 days 2 kit 11 Taking    insulin (BASAGLAR KWIKPEN U-100 INSULIN) glargine 100 units/mL SubQ pen Inject 35 Units into the skin once daily. 30 mL 3 Taking    irbesartan (AVAPRO) 300 MG tablet Take 300 mg by mouth every evening.   Taking    rosuvastatin (CRESTOR) 20 MG tablet Take 1 tablet (20 mg total) by mouth once daily. 90 tablet 3     semaglutide (OZEMPIC) 2 mg/dose (8 mg/3 mL) PnIj Inject 2 mg into the skin every 7 days. 9 mL 3 Taking    syringe with needle (SYRINGE 3CC/21GX1") 3 mL 21 gauge x 1" Syrg 1 Units by Misc.(Non-Drug; Combo Route) route once a week. 100 each 1 Taking    syringe with needle (SYRINGE 3CC/22GX1") 3 mL 22 gauge x 1" Syrg 1 Units by Misc.(Non-Drug; Combo Route) route every 28 days. 100 each 2 Taking " "   tadalafiL (CIALIS) 5 MG tablet Take 1 tablet (5 mg total) by mouth once daily. 90 tablet 3 Taking    testosterone cypionate (DEPOTESTOTERONE CYPIONATE) 200 mg/mL injection Inject 1 mL (200 mg total) into the muscle every 14 (fourteen) days. 5 mL 5 Taking    [DISCONTINUED] losartan-hydrochlorothiazide 100-25 mg (HYZAAR) 100-25 mg per tablet Take 1 tablet by mouth once daily. 90 tablet 3     acyclovir 5% (ZOVIRAX) 5 % ointment Apply topically 5 (five) times daily. 15 g 11     clindamycin (CLEOCIN T) 1 % lotion Apply twice daily to affected area 60 mL 2     mupirocin (BACTROBAN) 2 % ointment Apply topically 3 (three) times daily. 15 g 0     valACYclovir (VALTREX) 1000 MG tablet 1 tab po BID x 5 days prn cold sore (Patient not taking: Reported on 8/28/2024) 60 tablet 11 Not Taking    [DISCONTINUED] blood sugar diagnostic Strp Test blood sugar 3 (three) times daily before meals. 100 strip 11     [DISCONTINUED] omeprazole (PRILOSEC) 40 MG capsule Take 1 capsule (40 mg total) by mouth once daily. 30 capsule 5     [DISCONTINUED] pen needle, diabetic 32 gauge x 5/32" Ndle Use nightly as directed with insulin pen. 100 each 5          Vital signs:   Vitals:    08/28/24 0901   BP: 137/83   Pulse: 80   SpO2: 95%   Weight: 112.1 kg (247 lb 3.9 oz)   Height: 6' (1.829 m)     Body mass index is 33.53 kg/m².    PHYSICAL EXAM:     Physical Exam  Vitals reviewed.   Constitutional:       General: He is not in acute distress.     Appearance: Normal appearance.   HENT:      Head: Normocephalic and atraumatic.      Right Ear: Tympanic membrane and ear canal normal.      Left Ear: Tympanic membrane and ear canal normal.      Nose: Nose normal.      Mouth/Throat:      Pharynx: Uvula midline.   Eyes:      General: No scleral icterus.     Conjunctiva/sclera: Conjunctivae normal.   Neck:      Thyroid: No thyromegaly.      Vascular: Normal carotid pulses. No carotid bruit or JVD.      Trachea: Trachea normal.   Cardiovascular:      " Rate and Rhythm: Normal rate and regular rhythm.      Pulses: Normal pulses.      Heart sounds: Normal heart sounds. No murmur heard.     No friction rub. No gallop.   Pulmonary:      Effort: Pulmonary effort is normal.      Breath sounds: Normal breath sounds. No decreased breath sounds, wheezing, rhonchi or rales.   Abdominal:      General: Bowel sounds are normal.      Palpations: Abdomen is soft. There is no hepatomegaly.      Tenderness: There is no abdominal tenderness.   Musculoskeletal:      Cervical back: Neck supple.      Right lower leg: No edema.      Left lower leg: No edema.   Lymphadenopathy:      Cervical: No cervical adenopathy.   Skin:     General: Skin is warm.      Capillary Refill: Capillary refill takes less than 2 seconds.      Nails: There is no clubbing.   Neurological:      Mental Status: He is alert and oriented to person, place, and time.           PERTINENT RESULTS:   Lab Visit on 08/26/2024   Component Date Value Ref Range Status    PSA Diagnostic 08/26/2024 0.62  0.00 - 4.00 ng/mL Final    Comment: The testing method is a chemiluminescent microparticle immunoassay   manufactured by [employer] and performed on the [employer]   or   Peer[employer] system. Values obtained with different assay manufacturers   for   methods may be different and cannot be used interchangeably.  PSA Expected levels:  Hormonal Therapy: <0.05 ng/ml  Prostatectomy: <0.01 ng/ml  Radiation Therapy: <1.00 ng/ml      Testosterone, Total 08/26/2024 >1500 (H)  304 - 1227 ng/dL Final    WBC 08/26/2024 8.09  3.90 - 12.70 K/uL Final    RBC 08/26/2024 4.54 (L)  4.60 - 6.20 M/uL Final    Hemoglobin 08/26/2024 15.2  14.0 - 18.0 g/dL Final    Hematocrit 08/26/2024 42.7  40.0 - 54.0 % Final    MCV 08/26/2024 94  82 - 98 fL Final    MCH 08/26/2024 33.5 (H)  27.0 - 31.0 pg Final    MCHC 08/26/2024 35.6  32.0 - 36.0 g/dL Final    RDW 08/26/2024 11.4 (L)  11.5 - 14.5 % Final    Platelets 08/26/2024 235  150 - 450  K/uL Final    MPV 08/26/2024 9.6  9.2 - 12.9 fL Final    Immature Granulocytes 08/26/2024 0.5  0.0 - 0.5 % Final    Gran # (ANC) 08/26/2024 5.0  1.8 - 7.7 K/uL Final    Immature Grans (Abs) 08/26/2024 0.04  0.00 - 0.04 K/uL Final    Comment: Mild elevation in immature granulocytes is non specific and   can be seen in a variety of conditions including stress response,   acute inflammation, trauma and pregnancy. Correlation with other   laboratory and clinical findings is essential.      Lymph # 08/26/2024 2.2  1.0 - 4.8 K/uL Final    Mono # 08/26/2024 0.8  0.3 - 1.0 K/uL Final    Eos # 08/26/2024 0.1  0.0 - 0.5 K/uL Final    Baso # 08/26/2024 0.07  0.00 - 0.20 K/uL Final    nRBC 08/26/2024 0  0 /100 WBC Final    Gran % 08/26/2024 61.3  38.0 - 73.0 % Final    Lymph % 08/26/2024 26.6  18.0 - 48.0 % Final    Mono % 08/26/2024 9.6  4.0 - 15.0 % Final    Eosinophil % 08/26/2024 1.6  0.0 - 8.0 % Final    Basophil % 08/26/2024 0.9  0.0 - 1.9 % Final    Differential Method 08/26/2024 Automated   Final   Lab Visit on 08/26/2024   Component Date Value Ref Range Status    Sodium 08/26/2024 138  136 - 145 mmol/L Final    Potassium 08/26/2024 4.3  3.5 - 5.1 mmol/L Final    Chloride 08/26/2024 105  95 - 110 mmol/L Final    CO2 08/26/2024 23  23 - 29 mmol/L Final    Glucose 08/26/2024 202 (H)  70 - 110 mg/dL Final    BUN 08/26/2024 18  8 - 23 mg/dL Final    Creatinine 08/26/2024 1.3  0.5 - 1.4 mg/dL Final    Calcium 08/26/2024 9.2  8.7 - 10.5 mg/dL Final    Total Protein 08/26/2024 7.2  6.0 - 8.4 g/dL Final    Albumin 08/26/2024 3.7  3.5 - 5.2 g/dL Final    Total Bilirubin 08/26/2024 0.8  0.1 - 1.0 mg/dL Final    Comment: For infants and newborns, interpretation of results should be based  on gestational age, weight and in agreement with clinical  observations.    Premature Infant recommended reference ranges:  Up to 24 hours.............<8.0 mg/dL  Up to 48 hours............<12.0 mg/dL  3-5  days..................<15.0 mg/dL  6-29 days.................<15.0 mg/dL      Alkaline Phosphatase 08/26/2024 49 (L)  55 - 135 U/L Final    AST 08/26/2024 24  10 - 40 U/L Final    ALT 08/26/2024 29  10 - 44 U/L Final    eGFR 08/26/2024 >60.0  >60 mL/min/1.73 m^2 Final    Anion Gap 08/26/2024 10  8 - 16 mmol/L Final    Hemoglobin A1C 08/26/2024 7.7 (H)  4.0 - 5.6 % Final    Comment: ADA Screening Guidelines:  5.7-6.4%  Consistent with prediabetes  >or=6.5%  Consistent with diabetes    High levels of fetal hemoglobin interfere with the HbA1C  assay. Heterozygous hemoglobin variants (HbS, HgC, etc)do  not significantly interfere with this assay.   However, presence of multiple variants may affect accuracy.      Estimated Avg Glucose 08/26/2024 174 (H)  68 - 131 mg/dL Final    Cholesterol 08/26/2024 147  120 - 199 mg/dL Final    Comment: The National Cholesterol Education Program (NCEP) has set the  following guidelines (reference ranges) for Cholesterol:  Optimal.....................<200 mg/dL  Borderline High.............200-239 mg/dL  High........................> or = 240 mg/dL      Triglycerides 08/26/2024 62  30 - 150 mg/dL Final    Comment: The National Cholesterol Education Program (NCEP) has set the  following guidelines (reference values) for triglycerides:  Normal......................<150 mg/dL  Borderline High.............150-199 mg/dL  High........................200-499 mg/dL      HDL 08/26/2024 56  40 - 75 mg/dL Final    Comment: The National Cholesterol Education Program (NCEP) has set the  following guidelines (reference values) for HDL Cholesterol:  Low...............<40 mg/dL  Optimal...........>60 mg/dL      LDL Cholesterol 08/26/2024 78.6  63.0 - 159.0 mg/dL Final    Comment: The National Cholesterol Education Program (NCEP) has set the  following guidelines (reference values) for LDL Cholesterol:  Optimal.......................<130 mg/dL  Borderline High...............130-159  mg/dL  High..........................160-189 mg/dL  Very High.....................>190 mg/dL      HDL/Cholesterol Ratio 08/26/2024 38.1  20.0 - 50.0 % Final    Total Cholesterol/HDL Ratio 08/26/2024 2.6  2.0 - 5.0 Final    Non-HDL Cholesterol 08/26/2024 91  mg/dL Final    Comment: Risk category and Non-HDL cholesterol goals:  Coronary heart disease (CHD)or equivalent (10-year risk of CHD >20%):  Non-HDL cholesterol goal     <130 mg/dL  Two or more CHD risk factors and 10-year risk of CHD <= 20%:  Non-HDL cholesterol goal     <160 mg/dL  0 to 1 CHD risk factor:  Non-HDL cholesterol goal     <190 mg/dL      Testosterone, Total 08/26/2024 1468 (H)  304 - 1227 ng/dL Final       ASSESSMENT/PLAN:    1. Type 2 diabetes mellitus with diabetic peripheral angiopathy and gangrene, without long-term current use of insulin  Overview:    Lab Results   Component Value Date    HGBA1C 7.7 (H) 08/26/2024     - A1C improved from 8.5% to 6.9% once he was able to get his semaglutide and now worsening since unable to get medication  - while out of Ozempic increase Basaglar to 35 units daily  - patience assistance program  - continue current management plan   - patient encouraged to notify me with any changes  - eye exam scheduled for 05/01/2024    Orders:  -     Hemoglobin A1C; Future; Expected date: 11/28/2024  -     Ambulatory referral/consult to Pharmacy Assistance; Future; Expected date: 09/04/2024  -     Basic Metabolic Panel; Future; Expected date: 11/28/2024    2. Hypertension associated with type 2 diabetes mellitus  Overview:  - well controlled  - but I do not recommend losartan and irbesartan; so recommend stop losartan  - increase irbesartan 1/2 to 1 tab po daily  - continue HCTZ 25 mg daily  - monitor BP  Lab Results   Component Value Date    K 4.3 08/26/2024         Orders:  -     hydroCHLOROthiazide (HYDRODIURIL) 25 MG tablet; Take 1 tablet (25 mg total) by mouth once daily.  Dispense: 90 tablet; Refill: 0  -     Basic  Metabolic Panel; Future; Expected date: 11/28/2024    3. Elevated LFTs  Overview:  Lab Results   Component Value Date    ALT 29 08/26/2024    AST 24 08/26/2024    ALKPHOS 49 (L) 08/26/2024    BILITOT 0.8 08/26/2024     - resolved      4. Hyperlipidemia associated with type 2 diabetes mellitus  Overview:  Lab Results   Component Value Date    LDLCALC 78.6 08/26/2024     - LDL goal <70 near goal  - restart rosuvastatin 20 mg daily      5. Erectile dysfunction, unspecified erectile dysfunction type  Overview:  - okay to restart Cialis 5 mg daily  - he has been evaluated by urology      6. Low testosterone  Overview:  - followed by Urology    Orders:  -     Cancel: TESTOSTERONE; Future; Expected date: 08/28/2024  -     TESTOSTERONE; Future; Expected date: 11/28/2024    7. Class 1 obesity due to excess calories with serious comorbidity and body mass index (BMI) of 33.0 to 33.9 in adult  Overview:  - discussed recommendation for diet and cardiovascular exercise  - counseling on lifestyle modifications for risk factor reduction  - weight gained since off of Ozempic              ORDERS:   Orders Placed This Encounter    Hemoglobin A1C    Basic Metabolic Panel    TESTOSTERONE    Ambulatory referral/consult to Pharmacy Assistance    hydroCHLOROthiazide (HYDRODIURIL) 25 MG tablet       Vaccines recommended:  Prevnar 20, shingles, RSV and COVID-19.  He declines all vaccinations    Follow up in about 3 months (around 11/28/2024) for Virtual Visit, diabetes. or sooner with any concerns      This note is dictated using the M*Modal Fluency Direct word recognition program. There are word recognition mistakes that are occasionally missed on review.    Dr. Idalia Alexandra D.O.   Family Medicine

## 2024-08-28 ENCOUNTER — OFFICE VISIT (OUTPATIENT)
Dept: PRIMARY CARE CLINIC | Facility: CLINIC | Age: 69
End: 2024-08-28
Attending: FAMILY MEDICINE
Payer: MEDICARE

## 2024-08-28 ENCOUNTER — TELEPHONE (OUTPATIENT)
Dept: PHARMACY | Facility: CLINIC | Age: 69
End: 2024-08-28
Payer: MEDICARE

## 2024-08-28 VITALS
BODY MASS INDEX: 33.49 KG/M2 | HEIGHT: 72 IN | DIASTOLIC BLOOD PRESSURE: 83 MMHG | HEART RATE: 80 BPM | SYSTOLIC BLOOD PRESSURE: 137 MMHG | OXYGEN SATURATION: 95 % | WEIGHT: 247.25 LBS

## 2024-08-28 DIAGNOSIS — E11.69 HYPERLIPIDEMIA ASSOCIATED WITH TYPE 2 DIABETES MELLITUS: ICD-10-CM

## 2024-08-28 DIAGNOSIS — R79.89 ELEVATED LFTS: ICD-10-CM

## 2024-08-28 DIAGNOSIS — E11.59 HYPERTENSION ASSOCIATED WITH TYPE 2 DIABETES MELLITUS: ICD-10-CM

## 2024-08-28 DIAGNOSIS — R79.89 LOW TESTOSTERONE: ICD-10-CM

## 2024-08-28 DIAGNOSIS — N52.9 ERECTILE DYSFUNCTION, UNSPECIFIED ERECTILE DYSFUNCTION TYPE: ICD-10-CM

## 2024-08-28 DIAGNOSIS — I15.2 HYPERTENSION ASSOCIATED WITH TYPE 2 DIABETES MELLITUS: ICD-10-CM

## 2024-08-28 DIAGNOSIS — E66.09 CLASS 1 OBESITY DUE TO EXCESS CALORIES WITH SERIOUS COMORBIDITY AND BODY MASS INDEX (BMI) OF 33.0 TO 33.9 IN ADULT: ICD-10-CM

## 2024-08-28 DIAGNOSIS — E78.5 HYPERLIPIDEMIA ASSOCIATED WITH TYPE 2 DIABETES MELLITUS: ICD-10-CM

## 2024-08-28 DIAGNOSIS — E11.52 TYPE 2 DIABETES MELLITUS WITH DIABETIC PERIPHERAL ANGIOPATHY AND GANGRENE, WITHOUT LONG-TERM CURRENT USE OF INSULIN: Primary | ICD-10-CM

## 2024-08-28 PROBLEM — K30 DELAYED GASTRIC EMPTYING: Status: RESOLVED | Noted: 2022-12-14 | Resolved: 2024-08-28

## 2024-08-28 PROCEDURE — G2211 COMPLEX E/M VISIT ADD ON: HCPCS | Mod: S$PBB,,, | Performed by: FAMILY MEDICINE

## 2024-08-28 PROCEDURE — 99214 OFFICE O/P EST MOD 30 MIN: CPT | Mod: PBBFAC,PN | Performed by: FAMILY MEDICINE

## 2024-08-28 PROCEDURE — 99214 OFFICE O/P EST MOD 30 MIN: CPT | Mod: S$PBB,,, | Performed by: FAMILY MEDICINE

## 2024-08-28 PROCEDURE — 99999 PR PBB SHADOW E&M-EST. PATIENT-LVL IV: CPT | Mod: PBBFAC,,, | Performed by: FAMILY MEDICINE

## 2024-08-28 RX ORDER — HYDROCHLOROTHIAZIDE 25 MG/1
25 TABLET ORAL DAILY
Qty: 90 TABLET | Refills: 0 | Status: SHIPPED | OUTPATIENT
Start: 2024-08-28 | End: 2024-11-26

## 2024-08-28 RX ORDER — IRBESARTAN 300 MG/1
300 TABLET ORAL NIGHTLY
COMMUNITY

## 2024-08-28 NOTE — TELEPHONE ENCOUNTER
We have reached out to Mr. Gonzalez to inform him of the [employer] application process for Ozempic 2mg and whats required to apply.  He did not answer.         I left a voicemail   I mailed a letter and sent a portal message       Follow-up will be made in 5 business days.    Ladi Farrar  Pharmacy Patient Assistance Team

## 2024-08-28 NOTE — LETTER
August 28, 2024    Sj Gonzalez  [address] [place] [address]       Dear Mr. Gonzalez,      My name is Ladi Charan  I am reaching out on behalf of [facility]s Pharmacy Patient Assistance Team after receiving a referral from your Provider inquiring about assistance with your medication. I tried to contact you on 8/28/2024 . Sorry we missed you. Our goal is to assist qualified patients with financial assistance for their medications to better help enrollment for their medications to better help you achieve your health goals.      Please note that enrollment into available support will require the following documents:      Proof of household Income (such as social security statement, 1099 form, pension statement or 3 consecutive pay stubs)  Copy of all insurance cards (front and back)  Print out from your insurance or pharmacy showing how much you have spent on prescriptions this year  Completed Medication [facility] Authorization Forms (mailed to address on file)       Please reach out to my phone number below if you are still in need of assistance with your medications. Follow-up attempt to reach you through MyChart or letter will be made in 5 business days. We look forward to hearing from you soon!    [facility] you for [email or web address]      Sincerely  Ladi PARMAR @[phone]  Pharmacy Patient Assistance Team  [address] [place] [address]  [id number] [place] [id number]  Fax: [phone]  Email: [email or web address]

## 2024-09-06 NOTE — TELEPHONE ENCOUNTER
Please call patient and ask if his glucose levels have improved since I increased his Basaglar to 35 units daily.  Please get readings for his last 3 fasting glucose.    Dr. Idalia Alexandra D.O.   [facility]         No

## 2024-09-09 ENCOUNTER — TELEPHONE (OUTPATIENT)
Dept: UROLOGY | Facility: CLINIC | Age: 69
End: 2024-09-09
Payer: MEDICARE

## 2024-09-09 NOTE — TELEPHONE ENCOUNTER
Pt called staff about getting appointment changed to virtual. Staff was able to get appointment change. Pt now has virtual appointment with Dr. Hernandez.

## 2024-09-10 NOTE — PROGRESS NOTES
Subjective:       Sj Gonzalez is a 69 y.o. male who is an established patient who was referred by Dr. Idalia Alexandra  for evaluation of low T.      Previous pt of Dr Interiano for ED and low T. Was on TRT (200mg q2w) - last given by outside clinic about 18 months. Worked well when on TRT. Cialis 20mg PRN, Trimix for ED. Has been Cialis 5mg daily (not currently taking, has active rx). Has also seen Dr Riddle for low T. Returns now with low T though last value was low normal. Prior levels in 200 range.     Virtual visit for lab review. On T injections (200mg [id number]). T level high. Symptoms much improved with injections. No issues. Symptom control lasting until next dose. No SE.     Taking Cialis 5mg daily, occasionally 20mg PRN.     T:  6/22 - 228  11/23 - 231, PSA -0.43  2/24 - 318  8/24 - >1500, PSA - 0.62, Hgb - 15.2 (T inj 2d prior)      The following portions of the patient's history were reviewed and updated as appropriate: allergies, current medications, past family history, past medical history, past social history, past surgical history and problem list.    Review of Systems  Twelve point review of systems completed. Pertinent positive and negatives listed in HPI.      Objective:    Vitals: There were no vitals taken for this visit.    Physical Exam   General: well developed, well nourished in no acute distress  Head: normocephalic, atraumatic  Neck: no obvious enlargement of thyroid  HEENT: EOMI, mucus membranes moist, sclera anicteric, no hearing impairment  Lungs: symmetric expansion, non-labored breathing  Neuro: alert and oriented x 3, no gross deficits  Psych: normal judgment and insight, normal mood/affect and non-anxious  Genitourinary:   deferred   JANNET: deferred      Lab Review   Urine analysis today in clinic shows no urine     Lab Results   Component Value Date    WBC 8.09 08/26/2024    HGB 15.2 08/26/2024    HCT 42.7 08/26/2024    MCV 94 08/26/2024     08/26/2024     Lab Results    Component Value Date    CREATININE 1.3 08/26/2024    BUN 18 08/26/2024     Lab Results   Component Value Date    PSA 0.43 10/31/2023     Lab Results   Component Value Date    PSADIAG 0.62 08/26/2024       Imaging  NA         Assessment/Plan:      1. Hypogonadism male     - Symptomatic low T   - Prior TRT. Did well with injections. Comfortable with self-administering.    - T inj 200mg [id number] - doing well   - Labs 6 months   - T refilled today     - Discussed hypogonadism, common symptoms, treatment options, and the risks and benefits of treatment.  His symptoms and blood tests support the diagnosis and therefore treatment is appropriate.   - Discussed the various risks associated with TRT, specifically a possible increase in risk of heart disease, MI, CVA, PE, DVT. Also discussed the issues related to testosterone replacement and prostate cancer. TRT does not increase his risk of developing cancer. Recommend annual JANNET and PSA for PCa screening. TRT recommended to stop if abnormal JANNET or elevated PSA.   - Treatment options reviewed: regular injections, topical treatments including gels and creams, and implants such as TestoPel.  Risks and benefits of each were reviewed specifically T fluctuations with q2-4week injections and risk of transfer of medication to other with topical application.       2. Low testosterone    - Restarted TRT. Improved.      3. Erectile dysfunction due to arterial insufficiency    - On Cialis 5mg daily   - Previously on Trimix PRN   - Discussed TRT impact on ED       Follow up in 6 months with labs       The patient location is: [place]  The chief complaint leading to consultation is: low T    Visit type: audiovisual    Face to Face time with patient: 7min  15 minutes of total time spent on the encounter, which includes face to face time and non-face to face time preparing to see the patient (eg, review of tests), Obtaining and/or reviewing separately obtained history, Documenting clinical  information in the electronic or other health record, Independently interpreting results (not separately reported) and communicating results to the patient/family/caregiver, or Care coordination (not separately reported).         Each patient to whom he or she provides medical services by telemedicine is:  (1) informed of the relationship between the physician and patient and the respective role of any other health care provider with respect to management of the patient; and (2) notified that he or she may decline to receive medical services by telemedicine and may withdraw from such care at any time.    Notes:

## 2024-09-11 ENCOUNTER — PATIENT MESSAGE (OUTPATIENT)
Dept: UROLOGY | Facility: CLINIC | Age: 69
End: 2024-09-11

## 2024-09-11 ENCOUNTER — OFFICE VISIT (OUTPATIENT)
Dept: UROLOGY | Facility: CLINIC | Age: 69
End: 2024-09-11
Attending: UROLOGY
Payer: MEDICARE

## 2024-09-11 DIAGNOSIS — E29.1 HYPOGONADISM MALE: Primary | ICD-10-CM

## 2024-09-11 DIAGNOSIS — N52.01 ERECTILE DYSFUNCTION DUE TO ARTERIAL INSUFFICIENCY: ICD-10-CM

## 2024-09-11 RX ORDER — SYRINGE W-NEEDLE,DISPOSAB,3 ML 25GX5/8"
1 SYRINGE, EMPTY DISPOSABLE MISCELLANEOUS
Qty: 100 EACH | Refills: 2 | Status: SHIPPED | OUTPATIENT
Start: 2024-09-11

## 2024-09-11 RX ORDER — TESTOSTERONE CYPIONATE 200 MG/ML
200 INJECTION, SOLUTION INTRAMUSCULAR
Qty: 5 ML | Refills: 5 | Status: SHIPPED | OUTPATIENT
Start: 2024-09-11 | End: 2025-11-05

## 2024-09-12 ENCOUNTER — PATIENT MESSAGE (OUTPATIENT)
Dept: PHARMACY | Facility: CLINIC | Age: 69
End: 2024-09-12
Payer: MEDICARE

## 2024-10-12 ENCOUNTER — OFFICE VISIT (OUTPATIENT)
Dept: URGENT CARE | Facility: CLINIC | Age: 69
End: 2024-10-12
Payer: MEDICARE

## 2024-10-12 VITALS
HEIGHT: 72 IN | TEMPERATURE: 98 F | HEART RATE: 75 BPM | WEIGHT: 253.5 LBS | DIASTOLIC BLOOD PRESSURE: 80 MMHG | RESPIRATION RATE: 18 BRPM | OXYGEN SATURATION: 97 % | BODY MASS INDEX: 34.34 KG/M2 | SYSTOLIC BLOOD PRESSURE: 136 MMHG

## 2024-10-12 DIAGNOSIS — L03.113 CELLULITIS OF RIGHT HAND: ICD-10-CM

## 2024-10-12 DIAGNOSIS — T14.8XXA SKIN ABRASION: Primary | ICD-10-CM

## 2024-10-12 RX ORDER — AMOXICILLIN AND CLAVULANATE POTASSIUM 875; 125 MG/1; MG/1
1 TABLET, FILM COATED ORAL EVERY 12 HOURS
Qty: 20 TABLET | Refills: 0 | Status: SHIPPED | OUTPATIENT
Start: 2024-10-12

## 2024-10-12 RX ORDER — KETOROLAC TROMETHAMINE 10 MG/1
10 TABLET, FILM COATED ORAL EVERY 6 HOURS
Qty: 20 TABLET | Refills: 0 | Status: SHIPPED | OUTPATIENT
Start: 2024-10-12 | End: 2024-10-17

## 2024-10-12 RX ORDER — SILVER SULFADIAZINE 10 G/1000G
CREAM TOPICAL
Status: COMPLETED | OUTPATIENT
Start: 2024-10-12 | End: 2024-10-12

## 2024-10-12 RX ORDER — SILVER SULFADIAZINE 10 G/1000G
CREAM TOPICAL 2 TIMES DAILY
Qty: 20 G | Refills: 1 | Status: SHIPPED | OUTPATIENT
Start: 2024-10-12 | End: 2024-10-19

## 2024-10-12 RX ADMIN — SILVER SULFADIAZINE: 10 CREAM TOPICAL at 05:10

## 2024-10-12 NOTE — PATIENT INSTRUCTIONS
Use Silvadene cream once daily as we did in clinic.    Return to urgent care go to the emergency department if you get worse.

## 2024-10-12 NOTE — PROGRESS NOTES
Subjective:      Patient ID: Sj Gonzalez is a 69 y.o. male.    Vitals:  height is 6' (1.829 m) and weight is 115 kg (253 lb 8.5 oz). His oral temperature is 98.4 °F (36.9 °C). His blood pressure is 136/80 and his pulse is 75. His respiration is 18 and oxygen saturation is 97%.     Chief Complaint: Wound Check    Pt has an infection on his right hand that occurred from popping a blister 1 week ago.    Wound Check  He was originally treated 5 to 10 days ago. Prior ED Treatment: tripple antibiotics. His temperature was unmeasured prior to arrival. There has been colored discharge from the wound. The redness has not changed. The swelling has not changed. There is no pain present. There is difficulty moving the extremity or digit due to pain.       Skin:  Positive for wound and erythema.      Objective:     Physical Exam   Constitutional: He is oriented to person, place, and time. He appears well-developed.   HENT:   Head: Normocephalic and atraumatic. Head is without abrasion, without contusion and without laceration.   Ears:   Right Ear: External ear normal.   Left Ear: External ear normal.   Nose: Nose normal. No rhinorrhea or congestion.   Mouth/Throat: Oropharynx is clear and moist and mucous membranes are normal.   Eyes: Conjunctivae, EOM and lids are normal. Pupils are equal, round, and reactive to light. Right eye exhibits no discharge. Left eye exhibits no discharge.   Neck: Trachea normal and phonation normal. Neck supple.   Cardiovascular: Normal rate, regular rhythm and normal heart sounds.   Pulmonary/Chest: Effort normal and breath sounds normal. No stridor. No respiratory distress.   Musculoskeletal: Normal range of motion.         General: Normal range of motion.      Comments:  There is some mild erythema on the dorsum of the right hand with a small abrasion that is whenever cm oval.  There is only mild swelling.  Minimal tenderness palpation.  Distal sensory motor vascular all intact.  No streaking up  the arm.   Neurological: He is alert and oriented to person, place, and time.   Skin: Skin is warm, dry, intact and no rash. Capillary refill takes less than 2 seconds. erythema No abrasion, No burn, No bruising and No ecchymosis   Psychiatric: His speech is normal and behavior is normal. Judgment and thought content normal.   Nursing note and vitals reviewed.      Assessment:     1. Skin abrasion    2. Cellulitis of right hand        Plan:       Skin abrasion  -     silver sulfADIAZINE 1% cream  -     amoxicillin-clavulanate 875-125mg (AUGMENTIN) 875-125 mg per tablet; Take 1 tablet by mouth every 12 (twelve) hours.  Dispense: 20 tablet; Refill: 0  -     silver sulfADIAZINE 1% (SILVADENE) 1 % cream; Apply topically 2 (two) times daily. for 7 days  Dispense: 20 g; Refill: 1    Cellulitis of right hand  -     ketorolac (TORADOL) 10 mg tablet; Take 1 tablet (10 mg total) by mouth every 6 (six) hours. for 5 days  Dispense: 20 tablet; Refill: 0  -     amoxicillin-clavulanate 875-125mg (AUGMENTIN) 875-125 mg per tablet; Take 1 tablet by mouth every 12 (twelve) hours.  Dispense: 20 tablet; Refill: 0  -     silver sulfADIAZINE 1% (SILVADENE) 1 % cream; Apply topically 2 (two) times daily. for 7 days  Dispense: 20 g; Refill: 1      Follow up if symptoms worsen or fail to improve, for F/U with PCP or ED. There are no Patient Instructions on file for this visit.

## 2024-11-01 ENCOUNTER — PATIENT MESSAGE (OUTPATIENT)
Dept: PRIMARY CARE CLINIC | Facility: CLINIC | Age: 69
End: 2024-11-01
Payer: MEDICARE

## 2024-11-06 ENCOUNTER — PATIENT MESSAGE (OUTPATIENT)
Dept: PHARMACY | Facility: CLINIC | Age: 69
End: 2024-11-06
Payer: MEDICARE

## 2024-11-06 ENCOUNTER — OFFICE VISIT (OUTPATIENT)
Dept: PRIMARY CARE CLINIC | Facility: CLINIC | Age: 69
End: 2024-11-06
Payer: MEDICARE

## 2024-11-06 VITALS
HEART RATE: 69 BPM | OXYGEN SATURATION: 96 % | SYSTOLIC BLOOD PRESSURE: 134 MMHG | WEIGHT: 247 LBS | HEIGHT: 72 IN | BODY MASS INDEX: 33.46 KG/M2 | DIASTOLIC BLOOD PRESSURE: 82 MMHG

## 2024-11-06 DIAGNOSIS — E66.09 CLASS 1 OBESITY DUE TO EXCESS CALORIES WITH SERIOUS COMORBIDITY AND BODY MASS INDEX (BMI) OF 33.0 TO 33.9 IN ADULT: ICD-10-CM

## 2024-11-06 DIAGNOSIS — E11.59 HYPERTENSION ASSOCIATED WITH TYPE 2 DIABETES MELLITUS: Primary | ICD-10-CM

## 2024-11-06 DIAGNOSIS — E66.811 CLASS 1 OBESITY DUE TO EXCESS CALORIES WITH SERIOUS COMORBIDITY AND BODY MASS INDEX (BMI) OF 33.0 TO 33.9 IN ADULT: ICD-10-CM

## 2024-11-06 DIAGNOSIS — E11.69 HYPERLIPIDEMIA ASSOCIATED WITH TYPE 2 DIABETES MELLITUS: ICD-10-CM

## 2024-11-06 DIAGNOSIS — E78.5 HYPERLIPIDEMIA ASSOCIATED WITH TYPE 2 DIABETES MELLITUS: ICD-10-CM

## 2024-11-06 DIAGNOSIS — E11.52 TYPE 2 DIABETES MELLITUS WITH DIABETIC PERIPHERAL ANGIOPATHY AND GANGRENE, WITHOUT LONG-TERM CURRENT USE OF INSULIN: ICD-10-CM

## 2024-11-06 DIAGNOSIS — I15.2 HYPERTENSION ASSOCIATED WITH TYPE 2 DIABETES MELLITUS: Primary | ICD-10-CM

## 2024-11-06 PROCEDURE — 99215 OFFICE O/P EST HI 40 MIN: CPT | Mod: S$PBB,,,

## 2024-11-06 PROCEDURE — 99214 OFFICE O/P EST MOD 30 MIN: CPT | Mod: PBBFAC,PN

## 2024-11-06 PROCEDURE — 99999 PR PBB SHADOW E&M-EST. PATIENT-LVL IV: CPT | Mod: PBBFAC,,,

## 2024-11-06 RX ORDER — TIRZEPATIDE 2.5 MG/.5ML
2.5 INJECTION, SOLUTION SUBCUTANEOUS
Qty: 2 ML | Refills: 0 | Status: SHIPPED | OUTPATIENT
Start: 2024-11-06 | End: 2024-12-07

## 2024-11-06 NOTE — PROGRESS NOTES
INTERNAL MEDICINE  [facility]    Reason for visit:   Chief Complaint   Patient presents with    Hypertension    Diabetes     HPI: Sj Gonzalez is a 69 y.o. male presenting today for uncontrolled hypertension and type 2 diabetes mellitus.    Patient is an established patient of PCP, Dr. Idalia Alexandra DO. This patient is new to me.    Recent remote BP readings:  10-    143/89 HR 99  10-29             168/95 HR 75  10-30             158/96 HR 83  10-31             162/103 HR 79  11-1                171/104 HR 88    HYPERTENSION:  Current anti-hypertensive medications include:  amlodipine 10 mg daily taken in AM  irbesartan 300 mg daily taken in AM  hydrochlorothiazide 25 mg daily PRN leg swelling (not currently taking)    He monitors his blood pressure at home, reporting consistently high readings. He monitors blood pressure before taking his antihypertensive medication. Today's clinic reading is the most favorable recently. He denies associated symptoms with blood pressure readings.    DIABETES:  Blood glucose levels have also been elevated, with recent readings consistently in the 200s, higher than usual. He has been unable to fill his prescription for semaglutide due to a lack of coverage. Out of pocket cost is too expensive. He expresses interest in switching to an alternative GLP-1 as it provides better glucose control.     Patient recently restarted semaglutide (Ozempic) for diabetes and weight management after a period of discontinuation after finding doses in his fridge at home. He felt ill for 2 weeks after restarting, particularly when taking the full dose of 2 units on the pen. Patient denies any episodes of hypoglycemia.    He is on insulin glargine, taking 30 units daily in the morning. He aims to continue reducing his weight, believing it will improve his overall health and blood pressure.       ROS:  General: -fever, -chills, -fatigue, -weight gain, +weight  "loss  Cardiovascular: -chest pain, -palpitations, -lower extremity edema  Respiratory: -cough, -shortness of breath  Gastrointestinal: -abdominal pain, -nausea, -vomiting, -diarrhea, -constipation, -blood in stool  Musculoskeletal: -joint pain, -muscle pain  Skin: -rash, -lesion  Neurological: -headache, -dizziness, -numbness, -tingling        Social History     Social History Narrative    He lives in [facility]. He lives with his wife and 2 children. His children are attending [employer]. He lived [place]. . Nonsmoker. Social drinker. 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 His son attended [employer].        ALLERGIES:   Review of patient's allergies indicates:   Allergen Reactions    Metformin      GI intolerance    Lisinopril        MEDS:   Current Outpatient Medications on File Prior to Visit   Medication Sig Dispense Refill Last Dose/Taking    amLODIPine (NORVASC) 10 MG tablet Take 1 tablet (10 mg total) by mouth once daily. 90 tablet 3 Taking    flash glucose scanning reader (FREESTYLE MAKI 14 DAY READER) Misc use once daily 1 each 0 Taking    flash glucose sensor (FREESTYLE MAKI 14 DAY SENSOR) Kit use and change every 14 days 2 kit 11 Taking    hydroCHLOROthiazide (HYDRODIURIL) 25 MG tablet Take 1 tablet (25 mg total) by mouth once daily. 90 tablet 0 Taking Differently    insulin (BASAGLAR KWIKPEN U-100 INSULIN) glargine 100 units/mL SubQ pen Inject 35 Units into the skin once daily. 30 mL 3 Taking    irbesartan (AVAPRO) 300 MG tablet Take 300 mg by mouth every evening.   Taking    syringe with needle (SYRINGE 3CC/21GX1") 3 mL 21 gauge x 1" Syrg 1 Units by Misc.(Non-Drug; Combo Route) route once a week. 100 each 1 Taking    syringe with needle (SYRINGE 3CC/22GX1") 3 mL 22 gauge x 1" Syrg 1 Units by Misc.(Non-Drug; Combo Route) route every 28 days. 100 each 2 Taking    tadalafiL (CIALIS) 5 MG tablet Take 1 tablet (5 mg total) by mouth once daily. 90 tablet 3 Taking    testosterone cypionate " (DEPOTESTOTERONE CYPIONATE) 200 mg/mL injection Inject 1 mL (200 mg total) into the muscle every 14 (fourteen) days. 5 mL 5 Taking    valACYclovir (VALTREX) 1000 MG tablet 1 tab po BID x 5 days prn cold sore 60 tablet 11 Taking Differently    [DISCONTINUED] semaglutide (OZEMPIC) 2 mg/dose (8 mg/3 mL) PnIj Inject 2 mg into the skin every 7 days. 9 mL 3 Taking    acyclovir 5% (ZOVIRAX) 5 % ointment Apply topically 5 (five) times daily. (Patient not taking: Reported on 11/6/2024) 15 g 11 Not Taking    amoxicillin-clavulanate 875-125mg (AUGMENTIN) 875-125 mg per tablet Take 1 tablet by mouth every 12 (twelve) hours. (Patient not taking: Reported on 11/6/2024) 20 tablet 0 Not Taking    clindamycin (CLEOCIN T) 1 % lotion Apply twice daily to affected area (Patient not taking: Reported on 11/6/2024) 60 mL 2 Not Taking    mupirocin (BACTROBAN) 2 % ointment Apply topically 3 (three) times daily. (Patient not taking: Reported on 11/6/2024) 15 g 0 Not Taking    rosuvastatin (CRESTOR) 20 MG tablet Take 1 tablet (20 mg total) by mouth once daily. (Patient not taking: Reported on 11/6/2024) 90 tablet 3 Not Taking       Vital signs:   Vitals:    11/06/24 1311   BP: 134/82   Patient Position: Sitting   Pulse: 69   SpO2: 96%   Weight: 112.1 kg (247 lb 0.4 oz)   Height: 6' (1.829 m)     Body mass index is 33.5 kg/m².    PHYSICAL EXAM:     Physical Exam    Vitals: Blood pressure: 134/82.  General: No acute distress. Well-developed. Well-nourished.  Eyes: EOMI. Sclerae anicteric.  HENT: Normocephalic. Atraumatic.   Cardiovascular: Regular rate.   Respiratory: Normal respiratory effort.   Musculoskeletal: No obvious deformity.  Extremities: No lower extremity edema.  Neurological: Alert & oriented x3. No slurred speech.   Psychiatric: Normal mood. Normal affect. Good insight. Good judgment.  Skin: Warm. Dry. No rash.         PERTINENT RESULTS:   No visits with results within 1 Week(s) from this visit.   Latest known visit with results  is:   Lab Visit on 08/26/2024   Component Date Value Ref Range Status    PSA Diagnostic 08/26/2024 0.62  0.00 - 4.00 ng/mL Final    Comment: The testing method is a chemiluminescent microparticle immunoassay   manufactured by [employer] and performed on the [employer]   or   [employer] system. Values obtained with different assay manufacturers   for   methods may be different and cannot be used interchangeably.  PSA Expected levels:  Hormonal Therapy: <0.05 ng/ml  Prostatectomy: <0.01 ng/ml  Radiation Therapy: <1.00 ng/ml      Testosterone, Total 08/26/2024 >1500 (H)  304 - 1227 ng/dL Final    WBC 08/26/2024 8.09  3.90 - 12.70 K/uL Final    RBC 08/26/2024 4.54 (L)  4.60 - 6.20 M/uL Final    Hemoglobin 08/26/2024 15.2  14.0 - 18.0 g/dL Final    Hematocrit 08/26/2024 42.7  40.0 - 54.0 % Final    MCV 08/26/2024 94  82 - 98 fL Final    MCH 08/26/2024 33.5 (H)  27.0 - 31.0 pg Final    MCHC 08/26/2024 35.6  32.0 - 36.0 g/dL Final    RDW 08/26/2024 11.4 (L)  11.5 - 14.5 % Final    Platelets 08/26/2024 235  150 - 450 K/uL Final    MPV 08/26/2024 9.6  9.2 - 12.9 fL Final    Immature Granulocytes 08/26/2024 0.5  0.0 - 0.5 % Final    Gran # (ANC) 08/26/2024 5.0  1.8 - 7.7 K/uL Final    Immature Grans (Abs) 08/26/2024 0.04  0.00 - 0.04 K/uL Final    Comment: Mild elevation in immature granulocytes is non specific and   can be seen in a variety of conditions including stress response,   acute inflammation, trauma and pregnancy. Correlation with other   laboratory and clinical findings is essential.      Lymph # 08/26/2024 2.2  1.0 - 4.8 K/uL Final    Mono # 08/26/2024 0.8  0.3 - 1.0 K/uL Final    Eos # 08/26/2024 0.1  0.0 - 0.5 K/uL Final    Baso # 08/26/2024 0.07  0.00 - 0.20 K/uL Final    nRBC 08/26/2024 0  0 /100 WBC Final    Gran % 08/26/2024 61.3  38.0 - 73.0 % Final    Lymph % 08/26/2024 26.6  18.0 - 48.0 % Final    Mono % 08/26/2024 9.6  4.0 - 15.0 % Final    Eosinophil % 08/26/2024 1.6  0.0 - 8.0 % Final     Basophil % 08/26/2024 0.9  0.0 - 1.9 % Final    Differential Method 08/26/2024 Automated   Final       ASSESSMENT/PLAN:    Assessment & Plan    Assessed patient's blood pressure control and diabetes management  Determined hydrochlorothiazide was beneficial for blood pressure control despite absence of edema  Considered timing of medication administration to optimize efficacy and minimize side effects  Evaluated effectiveness of current diabetes medication regimen  Discussed potential benefits of Mounjaro (tirzepatide) for weight loss and diabetes management  Will increase basal insulin dose due to consistently elevated glucose readings    HYPERTENSION:  - Explained that hydrochlorothiazide is effective for blood pressure control even without edema.  - Discussed potential side effects of taking hydrochlorothiazide at night, including increased urination.  - Patient to monitor blood pressure readings consistently, especially after medication changes.  - Patient to take blood pressure readings at night before taking medications and again in the morning.  - Started hydrochlorothiazide 25 mg daily.  - Continued amlodipine 10 mg daily.  - Continued irbesartan 300 mg daily.  - Send blood pressure readings via [employer] message in 1 week.    DIABETES:  - Provided information on Mounjaro as a new medication option for diabetes management and weight loss.  - Patient to continue tracking glucose levels.  - Increased basal insulin to 35 units daily in the morning until GLP-1 initiated.        1. Hypertension associated with type 2 diabetes mellitus  Overview:  - uncontrolled currently  - continue amlodipine 10 mg daily  - continue irbesartan 300 mg daily  - start HCTZ 25 mg daily  - monitor BP  Lab Results   Component Value Date    K 4.3 08/26/2024         Orders:  -     tirzepatide (MOUNJARO) 2.5 mg/0.5 mL PnIj; Inject 2.5 mg into the skin every 7 days. After one month, if tolerating, will send in higher dose  Dispense: 2 mL;  Refill: 0    2. Type 2 diabetes mellitus with diabetic peripheral angiopathy and gangrene, without long-term current use of insulin  Overview:    Lab Results   Component Value Date    HGBA1C 7.7 (H) 08/26/2024     - A1C improved from 8.5% to 6.9% once he was able to get his semaglutide and now worsening since unable to get medication  - while out of Ozempic increase Basaglar to 35 units daily  - sent prescription for alternative med Mounjaro  - patient encouraged to notify PCP with any changes      Orders:  -     tirzepatide (MOUNJARO) 2.5 mg/0.5 mL PnIj; Inject 2.5 mg into the skin every 7 days. After one month, if tolerating, will send in higher dose  Dispense: 2 mL; Refill: 0    3. Hyperlipidemia associated with type 2 diabetes mellitus  Overview:  Lab Results   Component Value Date    LDLCALC 78.6 08/26/2024     - LDL goal <70 near goal  - continue rosuvastatin 20 mg daily    Orders:  -     tirzepatide (MOUNJARO) 2.5 mg/0.5 mL PnIj; Inject 2.5 mg into the skin every 7 days. After one month, if tolerating, will send in higher dose  Dispense: 2 mL; Refill: 0    4. Class 1 obesity due to excess calories with serious comorbidity and body mass index (BMI) of 33.0 to 33.9 in adult  Overview:  - discussed recommendation for diet and cardiovascular exercise  - counseling on lifestyle modifications for risk factor reduction  - weight gained since off of Ozempic      Orders:  -     tirzepatide (MOUNJARO) 2.5 mg/0.5 mL PnIj; Inject 2.5 mg into the skin every 7 days. After one month, if tolerating, will send in higher dose  Dispense: 2 mL; Refill: 0      Follow up in about 4 weeks (around 12/4/2024) for labs and scheduled visit with PCP.     SERGEY Hudson   Internal Medicine

## 2024-11-16 ENCOUNTER — TELEPHONE (OUTPATIENT)
Dept: PRIMARY CARE CLINIC | Facility: CLINIC | Age: 69
End: 2024-11-16
Payer: MEDICARE

## 2024-11-16 DIAGNOSIS — E11.59 HYPERTENSION ASSOCIATED WITH TYPE 2 DIABETES MELLITUS: Primary | ICD-10-CM

## 2024-11-16 DIAGNOSIS — I15.2 HYPERTENSION ASSOCIATED WITH TYPE 2 DIABETES MELLITUS: Primary | ICD-10-CM

## 2024-11-16 NOTE — TELEPHONE ENCOUNTER
Refill Routing Note   Medication(s) are not appropriate for processing by [facility] for the following reason(s):        No active prescription written by provider    [facility] action(s):  Defer               Appointments  past 12m or future 3m with PCP    Date Provider   Last Visit   8/28/2024 Idalia Alexandra, DO   Next Visit   12/3/2024 Idalia Alexandra, DO   ED visits in past 90 days: 0        Note composed:1:21 PM 11/16/2024

## 2024-11-16 NOTE — TELEPHONE ENCOUNTER
No care due was identified.  [facility] Embedded Care Due Messages. Reference number: [id number].   11/16/2024 7:05:17 AM CST

## 2024-11-18 NOTE — TELEPHONE ENCOUNTER
This patient has orders for losartan and irbesartan. He should only take one of these, not both. Please clarify which med he is taking before I refill. TY!!

## 2024-11-18 NOTE — TELEPHONE ENCOUNTER
Message left in regards to medication refill request. Informed via message to please contact office in regards to medication clarification per NP Damián.

## 2024-11-26 RX ORDER — LOSARTAN POTASSIUM AND HYDROCHLOROTHIAZIDE 25; 100 MG/1; MG/1
1 TABLET ORAL DAILY
Qty: 90 TABLET | Refills: 3 | Status: SHIPPED | OUTPATIENT
Start: 2024-11-26 | End: 2025-11-26

## 2024-11-26 RX ORDER — LOSARTAN POTASSIUM AND HYDROCHLOROTHIAZIDE 25; 100 MG/1; MG/1
1 TABLET ORAL
Qty: 90 TABLET | Refills: 0 | OUTPATIENT
Start: 2024-11-26

## 2024-11-26 NOTE — TELEPHONE ENCOUNTER
Orders Placed This Encounter    losartan-hydrochlorothiazide 100-25 mg (HYZAAR) 100-25 mg per tablet     Please let Sj Gonzalez know medication refilled    Dr. Idalia Alexandra D.O.   Family Medicine  '

## 2024-11-26 NOTE — TELEPHONE ENCOUNTER
Called and spoke to patient via telephone call in regards to Irbesartan medication. Informed patient that Nayana wanted me to clarify that he should be taking Irbesartan, not Losartan. Patient stated he is only taking the Irbesartan not the Losartan.     Patient also stated that he is taking the Amlodipine but would like to substitute this medication for an alternative one. Patient stated the amlodipine is giving side effects of ED, diarrhea, lightheadedness, and also that his BP is still high on it.     Informed patient I would inform Dr. Alexandra of these side effects, but that she may want to address them during VV with Dr. Alexandra on 12/3/24.     Patient verbalized gratitude and understanding.     Please advise.

## 2024-12-02 RX ORDER — TIRZEPATIDE 5 MG/.5ML
5 INJECTION, SOLUTION SUBCUTANEOUS
Qty: 6 ML | Refills: 0 | Status: CANCELLED | OUTPATIENT
Start: 2024-12-02 | End: 2025-03-02

## 2024-12-02 RX ORDER — INSULIN GLARGINE 100 [IU]/ML
35 INJECTION, SOLUTION SUBCUTANEOUS DAILY
Qty: 31.5 ML | Refills: 3 | Status: CANCELLED | OUTPATIENT
Start: 2024-12-02 | End: 2025-12-02

## 2024-12-02 NOTE — PROGRESS NOTES
VIRTUAL/TELEMEDICINE VISIT   FAMILY MEDICINE  [facility]    The patient location is: [place]  The chief complaint leading to consultation is:   Chief Complaint   Patient presents with    Follow-up    Diabetes    Hypertension     Visit type: Virtual visit with synchronous audio and video   Total time spent: 30 minute  Each patient to whom he or she provides medical services by telemedicine is:  (1) informed of the relationship between the physician and patient and the respective role of any other health care provider with respect to management of the patient; and (2) notified that he or she may decline to receive medical services by telemedicine and may withdraw from such care at any time.    Reason for visit:   Chief Complaint   Patient presents with    Follow-up    Diabetes    Hypertension       SUBJECTIVE: Sj Gonzalez is a 69 y.o. male  - obesity, hypertension, hyperlipidemia, type 2 diabetes, lumbar degenerative disc disease, and peripheral arterial disease presents for follow-up blood pressure and diabetes    Cardiology: Dr. Jeovany Hart   Endocrinology Dr. Castillo Guzman  Podiatry Dr. Zepeda  Gastroenterology: Dr. Hodges  Urology Dr. Reno Riddle    Sj Gonzalez said he has been listening to podcast that focuses on diet and lifestyle to get off of medications.  He noticed that on podcast his medication amlodipine may cause erectile dysfunction and he would like to discontinue the medication.  However he reports his blood pressure has not improved.  He did recently see my nurse practitioner 11/06/2024 with concerns for his blood pressure.  At that time he was taking irbesartan 300 mg daily with amlodipine 10 mg daily and she told him to add back his hydrochlorothiazide 25 mg daily.  He reports that he is not taking irbesartan and he was taking losartan/hydrochlorothiazide and have request a refill recently about medication.  Far as his lifestyle changes he has only started to add  broccoli and cauliflower to his diet.  He was not eliminated processed foods.  He was not started a routine exercise program     Secondary to Medicare donut hole he has not had his GLP 1 Ozempic since his last visit.  He was supposed to increase his Basaglar from 30 units daily to 35 units daily but he did not make that change.  He was prescribed Mounjaro but reports that it is not covered by his insurance.  He should be out of the [facility] in 1 month.  He has not been monitoring his glucose levels.      Sj Gonzalez also reports concerns with the 4th finger in his right hand locking.  He was seen by urgent care 10/12/2024 secondary to a cellulitis of his hand.  He reports that it healed well and there was no more swelling or erythema however since then he notices that when he makes a fist his 4th finger will lock temporarily and then open up.  He denies any pain.    1. Diabetes Type 2     Endocrinology Dr. Guzman     Current Outpatient Medications  flash glucose scanning reader (FREESTYLE MAKI 14 DAY READER) Misc, use once daily, Disp: 1 each, Rfl: 0  flash glucose sensor (FREESTYLE MAKI 14 DAY SENSOR) Kit, use and change every 14 days, Disp: 2 kit, Rfl: 11  insulin (BASAGLAR KWIKPEN U-100 INSULIN) glargine 100 units/mL SubQ pen, Inject 35 Units into the skin once daily.  - only taking 30 units     Side effects from treatment:  denies      Prior medication:   Metformin (stomach cramps)  Trulicity (too expensive)   Glipizide (discontinue started Trulicity with Basaglar)  Pioglitazone (cost)  [facility]   Mounjaro (not covered)    Complications of diabetes: history of diabetic foot ulcer healed; PAD     Glucometer: FreeStyle Maki 3  Glucose monitoring: premeal and bedtime    Lab Results       Component                Value               Date                       HGBA1C                   9.2 (H)             12/02/2024              Lab Results       Component                Value               Date                        HGBA1C                   7.7 (H)             08/26/2024              Lab Results       Component                Value               Date                       HGBA1C                   6.9 (H)             02/20/2024              Low dose statin: Rosuvastatin   Last eye exam: 5/2/24  Last foot exam: 11/27/23     Vaccines:   Influenza:  Due each flu season and pt declined  Prevnar 20: recommended and he declines  Covid-19 recommended and he declines     2. Hypertension  - Comorbid issues:  Type 2 diabetes, hypertension, erectile dysfunction, hyperlipidemia, peripheral arterial disease    Current medication treatment:   amLODIPine (NORVASC) 10 MG tablet, Take 1 tablet (10 mg total) by mouth once daily., Disp: 90 tablet, Rfl: 3  losartan-hydrochlorothiazide 100-25 mg (HYZAAR) 100-25 mg per tablet, Take 1 tablet by mouth once daily., Disp: 90 tablet, Rfl: 3    Medication side effects:  Erectile dysfunction    Exercise regimen: none    Home BP cuff: Yes  How often does patient monitoring BP? PRN          Review of Systems   All other systems reviewed and are negative.        HISTORY:   Past Medical History:   Diagnosis Date    COVID-19 04/12/2022    - COVID-19 positive - counseling on management COVID-19 - COVID risk score equals 5 - Recommend Paxlovid    Diabetes mellitus     Epigastric pain 09/07/2022    Generalized abdominal pain 03/16/2022    - unknown etiology - reviewed x-ray, abdominal ultrasound and colonoscopy - recommend CT scan abdomen pelvis - recommend re-evaluation by gastroenterology    Heart murmur     MVP    Hypertension     Non-recurrent unilateral inguinal hernia without obstruction or gangrene 09/30/2021    Type 2 diabetes mellitus with foot ulcer, with long-term current use of insulin 11/09/2021    Lab Results  Component  Value  Date     HGBA1C  8.5 (H)  11/20/2023     - +PAD  - ulcer resolved    Urinary tract infection        Past Surgical History:   Procedure Laterality Date     AORTOGRAPHY WITH SERIALOGRAPHY N/A 11/04/2021    Procedure: AORTOGRAM, WITH SERIALOGRAPHY;  Surgeon: Jeovany Hart III, MD;  Location: [facility] CATH LAB;  Service: Cardiology;  Laterality: N/A;    ARTHROSCOPY OF KNEE Right     COLONOSCOPY N/A 09/25/2020    Procedure: COLONOSCOPY;  Surgeon: Vicky Hodges MD;  Location: [facility] ENDO;  Service: Endoscopy;  Laterality: N/A;    ESOPHAGOGASTRODUODENOSCOPY N/A 09/16/2022    Procedure: EGD (ESOPHAGOGASTRODUODENOSCOPY);  Surgeon: Vicky Hodges MD;  Location: [facility] ENDO;  Service: Endoscopy;  Laterality: N/A;    HERNIA REPAIR      ROBOT-ASSISTED LAPAROSCOPIC REPAIR OF INGUINAL HERNIA Right 09/30/2021    Procedure: ROBOTIC REPAIR, HERNIA, INGUINAL;  Surgeon: Camrelo Farooq MD;  Location: [facility] OR;  Service: General;  Laterality: Right;    SPINE SURGERY      Lumbar       Family History   Problem Relation Name Age of Onset    No Known Problems Mother      No Known Problems Father      Diabetes Sister 3     Diabetes Brother 1     No Known Problems Daughter 1     Cancer Neg Hx      Heart disease Neg Hx      Prostate cancer Neg Hx      Kidney disease Neg Hx         Social History     Tobacco Use    Smoking status: Never     Passive exposure: Never    Smokeless tobacco: Never   Substance Use Topics    Alcohol use: Yes     Comment: daily    Drug use: No       Social History     Social History Narrative    He lives in [facility]. He lives with his wife and 2 children. His children are attending [employer]. He lived [place]. . Nonsmoker. Social drinker. He has a [employer] business. His son attended [employer].        ALLERGIES:   Review of patient's allergies indicates:   Allergen Reactions    Metformin      GI intolerance    Lisinopril        MEDS:   Current Outpatient Medications on File Prior to Visit   Medication Sig Dispense Refill Last Dose/Taking    rosuvastatin (CRESTOR) 20 MG tablet Take 1 tablet (20 mg total) by mouth once daily. 90 tablet 3  "Taking    acyclovir 5% (ZOVIRAX) 5 % ointment Apply topically 5 (five) times daily. (Patient not taking: Reported on 11/6/2024) 15 g 11     amLODIPine (NORVASC) 10 MG tablet Take 1 tablet (10 mg total) by mouth once daily. 90 tablet 3     clindamycin (CLEOCIN T) 1 % lotion Apply twice daily to affected area (Patient not taking: Reported on 11/6/2024) 60 mL 2     flash glucose scanning reader (FREESTYLE MAKI 14 DAY READER) Misc use once daily 1 each 0     flash glucose sensor (FREESTYLE MAKI 14 DAY SENSOR) Kit use and change every 14 days 2 kit 11     syringe with needle (SYRINGE 3CC/21GX1") 3 mL 21 gauge x 1" Syrg 1 Units by Misc.(Non-Drug; Combo Route) route once a week. 100 each 1     syringe with needle (SYRINGE 3CC/22GX1") 3 mL 22 gauge x 1" Syrg 1 Units by Misc.(Non-Drug; Combo Route) route every 28 days. 100 each 2     tadalafiL (CIALIS) 5 MG tablet Take 1 tablet (5 mg total) by mouth once daily. 90 tablet 3     testosterone cypionate (DEPOTESTOTERONE CYPIONATE) 200 mg/mL injection Inject 1 mL (200 mg total) into the muscle every 14 (fourteen) days. 5 mL 5     tirzepatide (MOUNJARO) 2.5 mg/0.5 mL PnIj Inject 2.5 mg into the skin every 7 days. After one month, if tolerating, will send in higher dose 2 mL 0     valACYclovir (VALTREX) 1000 MG tablet 1 tab po BID x 5 days prn cold sore 60 tablet 11     [DISCONTINUED] insulin (BASAGLAR KWIKPEN U-100 INSULIN) glargine 100 units/mL SubQ pen Inject 35 Units into the skin once daily. 30 mL 3     [DISCONTINUED] losartan-hydrochlorothiazide 100-25 mg (HYZAAR) 100-25 mg per tablet Take 1 tablet by mouth once daily. 90 tablet 3        Vital signs:   There were no vitals filed for this visit.  There is no height or weight on file to calculate BMI.    PHYSICAL EXAM:     Physical Exam  Constitutional:       General: He is not in acute distress.  Pulmonary:      Effort: Pulmonary effort is normal. No respiratory distress.   Neurological:      Mental Status: He is alert. "   Psychiatric:         Speech: Speech normal.           PERTINENT RESULTS:   Lab Visit on 12/02/2024   Component Date Value Ref Range Status    Hemoglobin A1C 12/02/2024 9.2 (H)  4.0 - 5.6 % Final    Comment: ADA Screening Guidelines:  5.7-6.4%  Consistent with prediabetes  >or=6.5%  Consistent with diabetes    High levels of fetal hemoglobin interfere with the HbA1C  assay. Heterozygous hemoglobin variants (HbS, HgC, etc)do  not significantly interfere with this assay.   However, presence of multiple variants may affect accuracy.      Estimated Avg Glucose 12/02/2024 217 (H)  68 - 131 mg/dL Final    Sodium 12/02/2024 136  136 - 145 mmol/L Final    Potassium 12/02/2024 4.5  3.5 - 5.1 mmol/L Final    Chloride 12/02/2024 102  95 - 110 mmol/L Final    CO2 12/02/2024 27  23 - 29 mmol/L Final    Glucose 12/02/2024 238 (H)  70 - 110 mg/dL Final    BUN 12/02/2024 15  8 - 23 mg/dL Final    Creatinine 12/02/2024 1.1  0.5 - 1.4 mg/dL Final    Calcium 12/02/2024 9.3  8.7 - 10.5 mg/dL Final    Anion Gap 12/02/2024 7 (L)  8 - 16 mmol/L Final    eGFR 12/02/2024 >60.0  >60 mL/min/1.73 m^2 Final    Testosterone, Total 12/02/2024 254 (L)  304 - 1227 ng/dL Final       ASSESSMENT/PLAN:    1. Type 2 diabetes mellitus with hyperglycemia, with long-term current use of insulin  Overview:  Lab Results   Component Value Date    HGBA1C 9.2 (H) 12/02/2024     - he was worsening glucose levels secondary to not being able to afford his GLP 1 secondary to the donut hole of his Medicare plan   -he was supposed to increase his Basaglar from 30 units to 35 units after his last visit but he was not   -I recommend increase Basaglar 30 units to 35 units   -I recommend close follow up with Rakel in 1-2 weeks  - stressed the importance of medication compliance and adjusting his insulin since he was not on a GLP 1 inhibitor.  Stressed the importance of monitoring his glucose levels   -stressed the importance of diet and exercise and he would like to  reduce his medications    Orders:  -     Lipid Panel; Future; Expected date: 03/02/2025  -     Hemoglobin A1C; Future; Expected date: 03/02/2025  -     Microalbumin/Creatinine Ratio, Urine; Future; Expected date: 03/02/2025  -     Comprehensive Metabolic Panel; Future; Expected date: 03/02/2025    2. Type 2 diabetes mellitus with diabetic peripheral angiopathy and gangrene, without long-term current use of insulin  Overview:    Lab Results   Component Value Date    HGBA1C 9.2 (H) 12/02/2024         Orders:  -     Lipid Panel; Future; Expected date: 03/02/2025  -     Hemoglobin A1C; Future; Expected date: 03/02/2025  -     Microalbumin/Creatinine Ratio, Urine; Future; Expected date: 03/02/2025  -     Comprehensive Metabolic Panel; Future; Expected date: 03/02/2025  -     insulin glargine U-100, Lantus, (BASAGLAR KWIKPEN U-100 INSULIN) 100 unit/mL (3 mL) InPn pen; Inject 35 Units into the skin once daily.  Dispense: 30 mL; Refill: 3    3. Hypertension associated with type 2 diabetes mellitus  Overview:    Lab Results   Component Value Date    K 4.5 12/02/2024     - he would like to discontinue amlodipine secondary to side effects   -discuss okay to stop amlodipine and losartan/hydrochlorothiazide   -discuss replacing his medications in the olmesartan/hydrochlorothiazide 40/25 mg daily  - recommend repeat BMP in 1 month    Orders:  -     Comprehensive Metabolic Panel; Future; Expected date: 03/02/2025  -     CBC Auto Differential; Future; Expected date: 03/02/2025  -     olmesartan-hydrochlorothiazide (BENICAR HCT) 40-25 mg per tablet; Take 1 tablet by mouth once daily.  Dispense: 90 tablet; Refill: 3  -     Basic Metabolic Panel; Future; Expected date: 01/03/2025    4. Trigger ring finger of right hand  Overview:  - recommend OTC splint and rest  - recommend evaluation by hand orthopedics if no improvement      5. Class 1 obesity due to excess calories with serious comorbidity and body mass index (BMI) of 33.0 to 33.9  in adult  Overview:  - discussed recommendation for diet and cardiovascular exercise  - counseling on lifestyle modifications for risk factor reduction  - weight gained since off of Ozempic                - Emphasized the importance of treating elevated blood pressure to prevent permanent damage and complications like erectile dysfunction.  - Discussed the relationship between hypertension, diabetes, and overall health.  - Educated on trigger finger condition and its management.  - Sj to cut out all processed foods.  - Sj to add 4 colors of the rainbow to every meal for nutrient diversity.  - Recommend walking 15-30 minutes, 5 days a week, outside of work.  - Sj to apply warm compress to affected finger nightly for trigger finger.  - Started Olmesartan with hydrochlorothiazide (combination pill) for blood pressure management.  - Increased Basal glargine (insulin) from 30 units to 35 units daily for diabetes management.  - Discontinued Losartan and Amlodipine.  - Follow up virtually in 1 month.  - Nurse to schedule appointment.  - Respond to [employer] message in 1-2 weeks regarding blood pressure and diabetes management.  - Contact the office via [employer] message for potential medication adjustments.         ORDERS:   Orders Placed This Encounter    Lipid Panel    Hemoglobin A1C    Microalbumin/Creatinine Ratio, Urine    Comprehensive Metabolic Panel    CBC Auto Differential    Basic Metabolic Panel    olmesartan-hydrochlorothiazide (BENICAR HCT) 40-25 mg per tablet    insulin glargine U-100, Lantus, (BASAGLAR KWIKPEN U-100 INSULIN) 100 unit/mL (3 mL) InPn pen       Vaccines recommended:  Declines all vaccinations    Follow up in about 1 month (around 1/3/2025) for + 1-2 week MyChart, Virtual Visit, diabetes, blood pressure. or sooner with any concerns      This note is dictated using the M*Modal Fluency Direct word recognition program. There are word recognition mistakes that are occasionally missed on  review.    Dr. Idalia Alexandra D.O.   [facility]

## 2024-12-03 ENCOUNTER — OFFICE VISIT (OUTPATIENT)
Dept: PRIMARY CARE CLINIC | Facility: CLINIC | Age: 69
End: 2024-12-03
Attending: FAMILY MEDICINE
Payer: MEDICARE

## 2024-12-03 ENCOUNTER — PATIENT MESSAGE (OUTPATIENT)
Dept: PRIMARY CARE CLINIC | Facility: CLINIC | Age: 69
End: 2024-12-03

## 2024-12-03 DIAGNOSIS — E11.65 TYPE 2 DIABETES MELLITUS WITH HYPERGLYCEMIA, WITH LONG-TERM CURRENT USE OF INSULIN: Primary | ICD-10-CM

## 2024-12-03 DIAGNOSIS — I15.2 HYPERTENSION ASSOCIATED WITH TYPE 2 DIABETES MELLITUS: ICD-10-CM

## 2024-12-03 DIAGNOSIS — E66.811 CLASS 1 OBESITY DUE TO EXCESS CALORIES WITH SERIOUS COMORBIDITY AND BODY MASS INDEX (BMI) OF 33.0 TO 33.9 IN ADULT: ICD-10-CM

## 2024-12-03 DIAGNOSIS — Z79.4 TYPE 2 DIABETES MELLITUS WITH HYPERGLYCEMIA, WITH LONG-TERM CURRENT USE OF INSULIN: Primary | ICD-10-CM

## 2024-12-03 DIAGNOSIS — E66.09 CLASS 1 OBESITY DUE TO EXCESS CALORIES WITH SERIOUS COMORBIDITY AND BODY MASS INDEX (BMI) OF 33.0 TO 33.9 IN ADULT: ICD-10-CM

## 2024-12-03 DIAGNOSIS — E11.59 HYPERTENSION ASSOCIATED WITH TYPE 2 DIABETES MELLITUS: ICD-10-CM

## 2024-12-03 DIAGNOSIS — E11.52 TYPE 2 DIABETES MELLITUS WITH DIABETIC PERIPHERAL ANGIOPATHY AND GANGRENE, WITHOUT LONG-TERM CURRENT USE OF INSULIN: ICD-10-CM

## 2024-12-03 DIAGNOSIS — M65.341 TRIGGER RING FINGER OF RIGHT HAND: ICD-10-CM

## 2024-12-03 PROCEDURE — 99214 OFFICE O/P EST MOD 30 MIN: CPT | Mod: 95,,, | Performed by: FAMILY MEDICINE

## 2024-12-03 PROCEDURE — G2211 COMPLEX E/M VISIT ADD ON: HCPCS | Mod: 95,,, | Performed by: FAMILY MEDICINE

## 2024-12-03 RX ORDER — INSULIN GLARGINE 100 [IU]/ML
35 INJECTION, SOLUTION SUBCUTANEOUS DAILY
Qty: 30 ML | Refills: 3 | Status: SHIPPED | OUTPATIENT
Start: 2024-12-03 | End: 2025-12-03

## 2024-12-03 RX ORDER — OLMESARTAN MEDOXOMIL AND HYDROCHLOROTHIAZIDE 40/25 40; 25 MG/1; MG/1
1 TABLET ORAL DAILY
Qty: 90 TABLET | Refills: 3 | Status: SHIPPED | OUTPATIENT
Start: 2024-12-03 | End: 2025-12-03

## 2024-12-03 NOTE — PATIENT INSTRUCTIONS
Adjustment of medication:   1. Increase Basaglar 30 units daily to 35 units daily   2. Stopped amlodipine and losartan/hydrochlorothiazide   3. Start olmesartan/hydrochlorothiazide 40/25 mg daily  4. Monitor glucose level daily fasting and at bedtime  5. Check blood pressure daily notify me and blood pressure greater than 150/100    Diabetes:     1.It is important that you bring your glucose monitor to every clinic visit.   2. Glucose goals  - Fasting AM glucose (no food 8 hours) </= 120 mg/dL  - 2 hours after you eat a meal, your glucose should be </=140 mg/dL  3. Monitor your glucose:  Fasting and at bedtime    Diabetes increases risk for kidney disease, eye disease (retinopathy which can lead to blindness), neuropathy, pneumonia, infections, cardiovascular disorders and stroke. Therefore all diabetes are recommend to do the following to decrease those risks:  Goal A1C <7% must be checked every 3-6 months depending how well you are controlled  At least a low dose cholesterol medication no matter what your cholesterol is to decrease risk of cardiovascular disease.   Yearly dilated eye exam for retinopathy screening.   Yearly foot exam to assess blood flow and sensation  Pneumonia vaccination every 5-10 years    Healthy Diet and Lifestyle:   - I recommend adjusting your diet and avoiding processed carbohydrates (like sweets, pasta, crackers, cookies, chips, bread, flour etc), increasing vegetables and adding light cardiovascular exercise like walking 15-30 mins daily to prevent worsening to diabetes.   - goal diet should be colorful, rich in vegetables, good fats (omega-3 fish, olive oil, raw nuts/seeds, avocado) and lean protein   - American Heart Association goal for cardiovascular exercise is 150 minutes per week of light exercise (like walking, biking, swimming) or 75 minutes a week of moderate to strenuous exercise (like jogging, high intensity training)

## 2024-12-10 ENCOUNTER — PATIENT MESSAGE (OUTPATIENT)
Dept: PRIMARY CARE CLINIC | Facility: CLINIC | Age: 69
End: 2024-12-10
Payer: MEDICARE

## 2024-12-23 ENCOUNTER — PATIENT MESSAGE (OUTPATIENT)
Dept: PRIMARY CARE CLINIC | Facility: CLINIC | Age: 69
End: 2024-12-23
Payer: MEDICARE

## 2024-12-23 ENCOUNTER — TELEPHONE (OUTPATIENT)
Dept: PRIMARY CARE CLINIC | Facility: CLINIC | Age: 69
End: 2024-12-23
Payer: MEDICARE

## 2024-12-23 NOTE — TELEPHONE ENCOUNTER
Aubree with [employer] has been contacted in regards to forms. Informed that last progress note has been faxed and sent to provided fax number.   Fax number used: [phone]

## 2024-12-23 NOTE — TELEPHONE ENCOUNTER
----- Message from Layer sent at 12/23/2024  2:39 PM CST -----  Name of Who is Calling:  Aubree with [facility] is calling on behalf of RICK SHUKLA [[id number]]          What is the request in detail: Aubree is calling to get pt last office notes. Please fax to [phone]. Please assist.           Can the clinic reply by [facility]: No          What [facility] Back [facility]: [phone] option 4

## 2025-01-06 ENCOUNTER — TELEPHONE (OUTPATIENT)
Dept: PRIMARY CARE CLINIC | Facility: CLINIC | Age: 70
End: 2025-01-06
Payer: MEDICARE

## 2025-01-06 DIAGNOSIS — Z79.4 TYPE 2 DIABETES MELLITUS WITH HYPERGLYCEMIA, WITH LONG-TERM CURRENT USE OF INSULIN: Primary | ICD-10-CM

## 2025-01-06 DIAGNOSIS — E11.65 TYPE 2 DIABETES MELLITUS WITH HYPERGLYCEMIA, WITH LONG-TERM CURRENT USE OF INSULIN: Primary | ICD-10-CM

## 2025-01-06 RX ORDER — TIRZEPATIDE 5 MG/.5ML
5 INJECTION, SOLUTION SUBCUTANEOUS
Qty: 6 ML | Refills: 1 | Status: SHIPPED | OUTPATIENT
Start: 2025-01-06 | End: 2025-01-06

## 2025-01-06 RX ORDER — SEMAGLUTIDE 1.34 MG/ML
1 INJECTION, SOLUTION SUBCUTANEOUS
Qty: 9 ML | Refills: 3 | Status: SHIPPED | OUTPATIENT
Start: 2025-01-06 | End: 2025-01-06 | Stop reason: DRUGHIGH

## 2025-01-06 RX ORDER — SEMAGLUTIDE 0.68 MG/ML
INJECTION, SOLUTION SUBCUTANEOUS
Qty: 9 ML | Refills: 0 | Status: SHIPPED | OUTPATIENT
Start: 2025-01-06 | End: 2025-04-06

## 2025-01-06 NOTE — TELEPHONE ENCOUNTER
Please contact patient.  Since his diabetes has been more difficult to control I actually recommend changing from Ozempic to Mounjaro which is more effective.  Hopefully this year it will be covered by his insurance. I sent the medication to [facility] to check on coverage.   [facility]  [address]   [place] [address]   Hours: Mon-Fri, 8a-5:30p   P: [phone]     Orders Placed This Encounter    tirzepatide (MOUNJARO) 5 mg/0.5 mL Alex Alexandra D.O.   Family Medicine

## 2025-01-06 NOTE — TELEPHONE ENCOUNTER
----- Message from Arian sent at 1/6/2025  1:55 PM CST -----  Contact: Patient  Type: RX Refill Request    Who Called: Patient    Refill or New RX: Refill    RX Name and Strength: semaglutide (OZEMPIC) 0.25 mg or 0.5 mg(2 mg/1.5 mL) pen injector          Preferred Pharmacy with Phone Number:   [id number] [place], [place] - [address] [facility]  [address] [address] [address]  Phone: [phone] Fax: [phone]            Best Call Back Number: [phone]      Additional Information: Pt would like to be contacted once the prescription has been sent over.

## 2025-01-06 NOTE — TELEPHONE ENCOUNTER
Ozempic 0.25 mg weekly for 2 weeks then increase to 0.5 mg weekly sent to [facility] pharmacy. He has to start with a lower dose since he has been off the medication for so long and increase slowly so that he does not have increased side effects.

## 2025-01-06 NOTE — TELEPHONE ENCOUNTER
----- Message from Med Assistant Zarina sent at 1/6/2025  3:17 PM CST -----  Regarding: FW: OZEMPIC) 0.25 mg or 0.5 mg(2 mg/1.5 mL)  Please advise patient message regarding medication. Medication not in current meds.   Thank you.  ----- Message -----  From: Elissa Russo  Sent: 1/6/2025   2:12 PM CST  To: Nasrin Friedman Staff  Subject: OZEMPIC) 0.25 mg or 0.5 mg(2 mg/1.5 mL)          Type:  Patient Returning Call        Name of who is calling:pt        What is request in detail:pt is requesting a call back in regards to medication he wants his ozempic, pt wants new pen. Pt states he want his old pen where he can measure and know where to be        Can clinic reply by [facility]:no        What [facility]:[phone]

## 2025-01-06 NOTE — TELEPHONE ENCOUNTER
Patient informed per TOM Alexandra medication has been sent to [facility] pharmacy area. All other information provided and understanding voiced.

## 2025-02-12 ENCOUNTER — PATIENT OUTREACH (OUTPATIENT)
Dept: ADMINISTRATIVE | Facility: HOSPITAL | Age: 70
End: 2025-02-12
Payer: MEDICARE

## 2025-02-12 NOTE — PROGRESS NOTES
Health Maintenance reviewed, updated and links triggered. Foot exam due (fford) 2/12/25  Spoke to wife she will have the patient call back once he is home.

## 2025-02-22 DIAGNOSIS — Z00.00 ENCOUNTER FOR MEDICARE ANNUAL WELLNESS EXAM: ICD-10-CM

## 2025-03-01 DIAGNOSIS — I15.2 HYPERTENSION ASSOCIATED WITH TYPE 2 DIABETES MELLITUS: ICD-10-CM

## 2025-03-01 DIAGNOSIS — E11.59 HYPERTENSION ASSOCIATED WITH TYPE 2 DIABETES MELLITUS: ICD-10-CM

## 2025-03-01 RX ORDER — AMLODIPINE BESYLATE 10 MG/1
10 TABLET ORAL
Qty: 90 TABLET | Refills: 2 | Status: SHIPPED | OUTPATIENT
Start: 2025-03-01

## 2025-03-01 NOTE — TELEPHONE ENCOUNTER
No care due was identified.  [facility] Embedded Care Due Messages. Reference number: [id number].   3/01/2025 7:03:42 AM CST

## 2025-03-01 NOTE — TELEPHONE ENCOUNTER
Refill Decision Note   Sj Gonzalez  is requesting a refill authorization.  Brief Assessment and Rationale for Refill:  Approve     Medication Therapy Plan:        Comments:     Note composed:10:28 AM 03/01/2025

## 2025-03-05 ENCOUNTER — PATIENT MESSAGE (OUTPATIENT)
Dept: PRIMARY CARE CLINIC | Facility: CLINIC | Age: 70
End: 2025-03-05
Payer: MEDICARE

## 2025-03-05 NOTE — TELEPHONE ENCOUNTER
Please offer virtual visit Nayana or Gaby. Please all medical issues offer a visit before sending to me

## 2025-03-13 ENCOUNTER — PATIENT OUTREACH (OUTPATIENT)
Dept: ADMINISTRATIVE | Facility: HOSPITAL | Age: 70
End: 2025-03-13
Payer: MEDICARE

## 2025-04-14 DIAGNOSIS — Z79.4 TYPE 2 DIABETES MELLITUS WITH HYPERGLYCEMIA, WITH LONG-TERM CURRENT USE OF INSULIN: ICD-10-CM

## 2025-04-14 DIAGNOSIS — E11.65 TYPE 2 DIABETES MELLITUS WITH HYPERGLYCEMIA, WITH LONG-TERM CURRENT USE OF INSULIN: ICD-10-CM

## 2025-04-14 RX ORDER — SEMAGLUTIDE 0.68 MG/ML
0.5 INJECTION, SOLUTION SUBCUTANEOUS
Qty: 9 ML | Refills: 0 | OUTPATIENT
Start: 2025-04-14

## 2025-04-14 NOTE — TELEPHONE ENCOUNTER
Care Due:                  Date            Visit Type   Department     Provider  --------------------------------------------------------------------------------                                ESTABLISHED                              PATIENT -    [facility]  Last Visit: 12-      VIRTUAL      CARE           Idalia Alexandra  Next Visit: None Scheduled  None         None Found                                                            Last  Test          Frequency    Reason                     Performed    Due Date  --------------------------------------------------------------------------------    HBA1C.......  6 months...  insulin..................  12- 05-    [facility] Embedded Care Due Messages. Reference number: [id number].   4/14/2025 7:06:53 AM CDT

## 2025-04-14 NOTE — TELEPHONE ENCOUNTER
Provider Staff:  Action required for this patient    Requires labs      Please see care gap opportunities below in Care Due Message.    Thanks!  [facility]     Appointments      Date Provider   Last Visit   12/3/2024 Idalia Alexandra, DO   Next Visit   Visit date not found Idalia Alexandra, DO     Refill Decision Note   Sj Gonzalez  is requesting a refill authorization.  Brief Assessment and Rationale for Refill:  Quick Discontinue     Medication Therapy Plan:  OZEMPIC 0.25 OR 0.5MG DOSE  ON 25      Comments:     Note composed:12:10 PM 2025

## 2025-04-29 RX ORDER — TIRZEPATIDE 2.5 MG/.5ML
2.5 INJECTION, SOLUTION SUBCUTANEOUS
Qty: 2 ML | Refills: 0 | Status: CANCELLED | OUTPATIENT
Start: 2025-04-29 | End: 2025-05-29

## 2025-04-29 RX ORDER — ROSUVASTATIN CALCIUM 20 MG/1
20 TABLET, COATED ORAL DAILY
Qty: 90 TABLET | Refills: 3 | Status: CANCELLED | OUTPATIENT
Start: 2025-04-29 | End: 2026-04-29

## 2025-04-29 NOTE — PROGRESS NOTES
"FAMILY MEDICINE  [facility]    Reason for visit:   Chief Complaint   Patient presents with    Otalgia    Hand Pain    Diabetes    Hypertension         SUBJECTIVE: Sj Gonzalez is a 69 y.o. male  - obesity, hypertension, hyperlipidemia, type 2 diabetes, lumbar degenerative disc disease, and peripheral arterial disease presents for ear pain and see CC    Cardiology: Dr. Jeovany Hart   Endocrinology Dr. Castillo Guzman  Podiatry Dr. Zepeda  Gastroenterology: Dr. Hodges  Urology Dr. Reno Riddle    Sj reports an ear infection for approximately 2 months. He used a small bottle of previously prescribed medication that provided initial relief, but the infection has recurred. He describes pain extending into his jaw, along the left side. He notes an uncharacterized sound in his ear, which is not quite a buzzing or ringing. He is concerned about potential hearing loss due to the infection.    He complains of hand pain and swelling, particularly on the left side. A previous "trigger finger" symptom has resolved, but significant pain and swelling in the hand persist.  He denies any soft tissue swelling but does have joint swelling.  He reports both his hands are very achy and stiff.    Sj Gonzalez did not have his labs done that were previously ordered.  He reports he is not checking his glucose regularly.  He reports he is out of his freestyle Jaelyn.  He has had issue with insurance coverage for his Ozempic and recently just restarted his Ozempic 0.5 mg weekly.  He was previously on 2 mg weekly.  And he would like to uptitrate the dose.    1. Diabetes Type 2     Endocrinology Dr. Guzman     Current Outpatient Medications  flash glucose scanning reader (FREESTYLE JAELYN 14 DAY READER) Misc, use once daily, Disp: 1 each, Rfl: 0  flash glucose sensor (FREESTYLE JAELYN 14 DAY SENSOR) Kit, use and change every 14 days, Disp: 2 kit, Rfl: 11  insulin (BASAGLAR KWIKPEN U-100 INSULIN) glargine 100 units/mL " SubQ pen, Inject 35 Units into the skin once daily.  Restart Ozempic 0.5 mg weekly but not effective and would like 2 mg weekly    Side effects from treatment:  denies      Prior medication:   Metformin (stomach cramps)  Trulicity (too expensive)   Glipizide (discontinue started Trulicity with Basaglar)  Pioglitazone (cost)  Ozempic (donut hole)   Mounjaro (not covered)    Complications of diabetes: history of diabetic foot ulcer healed; PAD     Glucometer: FreeStyle Jaelyn 3  Glucose monitoring: premeal and bedtime    Lab Results       Component                Value               Date                       HGBA1C                   9.2 (H)             12/02/2024              Lab Results       Component                Value               Date                       HGBA1C                   7.7 (H)             08/26/2024              Lab Results       Component                Value               Date                       HGBA1C                   6.9 (H)             02/20/2024              Low dose statin: Rosuvastatin   Last eye exam: 5/2/24 referral placed 04/30/2025  Last foot exam: 04/30/2025      Vaccines:   Influenza:  Due each flu season and pt declined  Prevnar 20: recommended and he declines  Covid-19 recommended and he declines     2. Hypertension  - Comorbid issues:  Type 2 diabetes, hypertension, erectile dysfunction, hyperlipidemia, peripheral arterial disease    Current medication treatment:   amLODIPine (NORVASC) 10 MG tablet, Take 1 tablet (10 mg total) by mouth once daily., Disp: 90 tablet, Rfl: 3  losartan-hydrochlorothiazide 100-25 mg (HYZAAR) 100-25 mg per tablet, Take 1 tablet by mouth once daily., Disp: 90 tablet, Rfl: 3    Medication side effects:  Erectile dysfunction    Exercise regimen: none    Home BP cuff: Yes  How often does patient monitoring BP? PRN              Review of Systems   All other systems reviewed and are negative.      HEALTH MAINTENANCE:   Health Maintenance   Topic Date Due     Aspirin/Antiplatelet Therapy  Never done    Pneumococcal Vaccines (Age 50+) (1 of 2 - PCV) Never done    Shingles Vaccine (1 of 2) Never done    RSV Vaccine (Age 60+ and Pregnant patients) (1 - Risk 60-74 years 1-dose series) Never done    Influenza Vaccine (1) 09/01/2024    COVID-19 Vaccine (1 - 2024-25 season) Never done    Diabetes Urine Screening  02/20/2025    Hemoglobin A1c  03/02/2025    Diabetic Eye Exam  05/02/2025    PROSTATE-SPECIFIC ANTIGEN  08/26/2025    Lipid Panel  08/26/2025    High Dose Statin  04/30/2026    Foot Exam  04/30/2026    Colorectal Cancer Screening  09/25/2030    TETANUS VACCINE  06/07/2032    Hepatitis C Screening  Completed     Health Maintenance Topics with due status: Not Due       Topic Last Completion Date    Colorectal Cancer Screening 09/25/2020    TETANUS VACCINE 06/07/2022    PROSTATE-SPECIFIC ANTIGEN 08/26/2024    Lipid Panel 08/26/2024    High Dose Statin 04/30/2025    Foot Exam 04/30/2025     Health Maintenance Due   Topic Date Due    Aspirin/Antiplatelet Therapy  Never done    Pneumococcal Vaccines (Age 50+) (1 of 2 - PCV) Never done    Shingles Vaccine (1 of 2) Never done    RSV Vaccine (Age 60+ and Pregnant patients) (1 - Risk 60-74 years 1-dose series) Never done    Influenza Vaccine (1) 09/01/2024    COVID-19 Vaccine (1 - 2024-25 season) Never done    Diabetes Urine Screening  02/20/2025    Hemoglobin A1c  03/02/2025    Diabetic Eye Exam  05/02/2025       HISTORY:   Past Medical History:   Diagnosis Date    COVID-19 04/12/2022    - COVID-19 positive - counseling on management COVID-19 - COVID risk score equals 5 - Recommend Paxlovid    Diabetes mellitus     Epigastric pain 09/07/2022    Generalized abdominal pain 03/16/2022    - unknown etiology - reviewed x-ray, abdominal ultrasound and colonoscopy - recommend CT scan abdomen pelvis - recommend re-evaluation by gastroenterology    Heart murmur     MVP    Herpes labialis 03/29/2023    - well controlled  - continue  current management plan   - patient encouraged to notify me with any changes      Hypertension     Non-recurrent unilateral inguinal hernia without obstruction or gangrene 09/30/2021    Type 2 diabetes mellitus with foot ulcer, with long-term current use of insulin 11/09/2021    Lab Results  Component  Value  Date     HGBA1C  8.5 (H)  11/20/2023     - +PAD  - ulcer resolved    Urinary tract infection        Past Surgical History:   Procedure Laterality Date    AORTOGRAPHY WITH SERIALOGRAPHY N/A 11/04/2021    Procedure: AORTOGRAM, WITH SERIALOGRAPHY;  Surgeon: Jeovany Hart III, MD;  Location: [facility] CATH LAB;  Service: Cardiology;  Laterality: N/A;    ARTHROSCOPY OF KNEE Right     COLONOSCOPY N/A 09/25/2020    Procedure: COLONOSCOPY;  Surgeon: Vicky Hodges MD;  Location: [facility] ENDO;  Service: Endoscopy;  Laterality: N/A;    ESOPHAGOGASTRODUODENOSCOPY N/A 09/16/2022    Procedure: EGD (ESOPHAGOGASTRODUODENOSCOPY);  Surgeon: Vicky Hodges MD;  Location: [facility] ENDO;  Service: Endoscopy;  Laterality: N/A;    HERNIA REPAIR      ROBOT-ASSISTED LAPAROSCOPIC REPAIR OF INGUINAL HERNIA Right 09/30/2021    Procedure: ROBOTIC REPAIR, HERNIA, INGUINAL;  Surgeon: Carmelo Farooq MD;  Location: [facility] OR;  Service: General;  Laterality: Right;    SPINE SURGERY      Lumbar       Family History   Problem Relation Name Age of Onset    No Known Problems Mother      No Known Problems Father      Diabetes Sister 3     Diabetes Brother 1     No Known Problems Daughter 1     Cancer Neg Hx      Heart disease Neg Hx      Prostate cancer Neg Hx      Kidney disease Neg Hx         Social History[1]    Social History     Social History Narrative    He lives in [place]. He lives with his wife and 2 children. His children are attending [employer]. He lived [place]. . Nonsmoker. Social drinker. 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 His son attended [employer].        ALLERGIES:   Review of patient's allergies  "indicates:   Allergen Reactions    Metformin      GI intolerance    Lisinopril        MEDS:   Current Outpatient Medications on File Prior to Visit   Medication Sig Dispense Refill Last Dose/Taking    amLODIPine (NORVASC) 10 MG tablet Take 1 tablet by mouth once daily 90 tablet 2 Taking    flash glucose scanning reader (FREESTYLE MAKI 14 DAY READER) Misc use once daily 1 each 0 Taking    insulin glargine U-100, Lantus, (BASAGLAR KWIKPEN U-100 INSULIN) 100 unit/mL (3 mL) InPn pen Inject 35 Units into the skin once daily. 30 mL 3 Taking    olmesartan-hydrochlorothiazide (BENICAR HCT) 40-25 mg per tablet Take 1 tablet by mouth once daily. 90 tablet 3 Taking    syringe with needle (SYRINGE 3CC/21GX1") 3 mL 21 gauge x 1" Syrg 1 Units by Misc.(Non-Drug; Combo Route) route once a week. 100 each 1 Taking    syringe with needle (SYRINGE 3CC/22GX1") 3 mL 22 gauge x 1" Syrg 1 Units by Misc.(Non-Drug; Combo Route) route every 28 days. 100 each 2 Taking    testosterone cypionate (DEPOTESTOTERONE CYPIONATE) 200 mg/mL injection Inject 1 mL (200 mg total) into the muscle every 14 (fourteen) days. 5 mL 5 Taking    valACYclovir (VALTREX) 1000 MG tablet 1 tab po BID x 5 days prn cold sore 60 tablet 11 Taking    [DISCONTINUED] flash glucose sensor (FREESTYLE MAKI 14 DAY SENSOR) Kit use and change every 14 days 2 kit 11 Taking    [DISCONTINUED] rosuvastatin (CRESTOR) 20 MG tablet Take 1 tablet (20 mg total) by mouth once daily. 90 tablet 3 Taking    [DISCONTINUED] semaglutide (OZEMPIC) 2 mg/dose (8 mg/3 mL) PnIj Inject 2 mg into the skin every 7 days.   Taking    acyclovir 5% (ZOVIRAX) 5 % ointment Apply topically 5 (five) times daily. (Patient not taking: Reported on 4/30/2025) 15 g 11 Not Taking    [DISCONTINUED] clindamycin (CLEOCIN T) 1 % lotion Apply twice daily to affected area (Patient not taking: Reported on 4/30/2025) 60 mL 2 Not Taking    [DISCONTINUED] tadalafiL (CIALIS) 5 MG tablet Take 1 tablet (5 mg total) by mouth once " daily. 90 tablet 3          Vital signs:   Vitals:    04/30/25 1130   BP: 130/77   BP Location: Left arm   Patient Position: Sitting   Pulse: 79   SpO2: 96%   Weight: 110.8 kg (244 lb 4.3 oz)   Height: 6' (1.829 m)     Body mass index is 33.13 kg/m².    PHYSICAL EXAM:     Physical Exam  Constitutional:       General: He is not in acute distress.  HENT:      Right Ear: Ear canal normal. A middle ear effusion is present. Tympanic membrane is erythematous. Tympanic membrane is not perforated. Tympanic membrane has decreased mobility.      Left Ear: Tympanic membrane and ear canal normal.      Ears:      Comments: Right TM opaque  Cardiovascular:      Rate and Rhythm: Normal rate and regular rhythm.      Pulses:           Dorsalis pedis pulses are 2+ on the right side and 2+ on the left side.        Posterior tibial pulses are 2+ on the right side and 2+ on the left side.      Heart sounds: Normal heart sounds. No murmur heard.     No friction rub. No gallop.   Pulmonary:      Effort: Pulmonary effort is normal.      Breath sounds: Normal breath sounds. No wheezing, rhonchi or rales.   Musculoskeletal:      Right hand: Deformity present.      Left hand: Deformity present.        Hands:       Cervical back: Neck supple.      Right lower leg: No edema.      Left lower leg: No edema.      Comments: Bilateral hands ulnar deviation right >left    Feet:      Right foot:      Protective Sensation: 5 sites tested.  5 sites sensed.      Skin integrity: Skin integrity normal.      Left foot:      Protective Sensation: 5 sites tested.  5 sites sensed.      Skin integrity: Skin integrity normal.   Skin:     General: Skin is warm.   Neurological:      Mental Status: He is alert.             PERTINENT RESULTS:   No visits with results within 1 Week(s) from this visit.   Latest known visit with results is:   Lab Visit on 12/02/2024   Component Date Value Ref Range Status    Hemoglobin A1C 12/02/2024 9.2 (H)  4.0 - 5.6 % Final     Comment: ADA Screening Guidelines:  5.7-6.4%  Consistent with prediabetes  >or=6.5%  Consistent with diabetes    High levels of fetal hemoglobin interfere with the HbA1C  assay. Heterozygous hemoglobin variants (HbS, HgC, etc)do  not significantly interfere with this assay.   However, presence of multiple variants may affect accuracy.      Estimated Avg Glucose 12/02/2024 217 (H)  68 - 131 mg/dL Final    Sodium 12/02/2024 136  136 - 145 mmol/L Final    Potassium 12/02/2024 4.5  3.5 - 5.1 mmol/L Final    Chloride 12/02/2024 102  95 - 110 mmol/L Final    CO2 12/02/2024 27  23 - 29 mmol/L Final    Glucose 12/02/2024 238 (H)  70 - 110 mg/dL Final    BUN 12/02/2024 15  8 - 23 mg/dL Final    Creatinine 12/02/2024 1.1  0.5 - 1.4 mg/dL Final    Calcium 12/02/2024 9.3  8.7 - 10.5 mg/dL Final    Anion Gap 12/02/2024 7 (L)  8 - 16 mmol/L Final    eGFR 12/02/2024 >60.0  >60 mL/min/1.73 m^2 Final    Testosterone, Total 12/02/2024 254 (L)  304 - 1227 ng/dL Final       ASSESSMENT/PLAN:    1. Non-recurrent acute suppurative otitis media of right ear without spontaneous rupture of tympanic membrane  -     Ambulatory referral/consult to ENT; Future; Expected date: 05/07/2025  -     amoxicillin-clavulanate 875-125mg (AUGMENTIN) 875-125 mg per tablet; Take 1 tablet by mouth 2 (two) times daily. for 10 days  Dispense: 20 tablet; Refill: 0  -     neomycin-polymyxin-hydrocortisone (CORTISPORIN) 3.5-10,000-1 mg/mL-unit/mL-% otic suspension; Place 3 drops into the right ear 4 (four) times daily.  Dispense: 10 mL; Refill: 0    2. Decreased hearing of right ear  -     Ambulatory referral/consult to Audiology; Future; Expected date: 05/07/2025    3. Chronic hand pain, right  -     X-Ray Hand Complete Right; Future; Expected date: 04/30/2025  -     Ambulatory referral/consult to Rheumatology; Future; Expected date: 05/07/2025    4. Type 2 diabetes mellitus with hyperglycemia, with long-term current use of insulin  Overview:  Lab Results    Component Value Date    HGBA1C 9.2 (H) 12/02/2024     - poorly controlled which may be due to compliance issues   -okay to increase his Ozempic back to 2 mg weekly   -continue Basaglar 35 units daily  -he is having issues with postprandial hyperglycemia and I discuss pre meal insulin however he does not feel like he can be compliant with pre meal insulin would like to trial getting back on his 2 mg Ozempic  - encouraged him to keep his follow-ups regularly and complete his blood work as recommended    Orders:  -     Lipid Panel; Future; Expected date: 04/30/2025  -     Hemoglobin A1C; Future; Expected date: 04/30/2025  -     Microalbumin/Creatinine Ratio, Urine; Future; Expected date: 04/30/2025  -     Comprehensive Metabolic Panel; Future; Expected date: 04/30/2025  -     Ambulatory referral/consult to Optometry; Future; Expected date: 05/06/2025  -     semaglutide (OZEMPIC) 2 mg/dose (8 mg/3 mL) PnIj; Inject 2 mg into the skin every 7 days.  Dispense: 9 mL; Refill: 3    5. Type 2 diabetes mellitus with diabetic peripheral angiopathy and gangrene, without long-term current use of insulin  Overview:    Lab Results   Component Value Date    HGBA1C 9.2 (H) 12/02/2024         Orders:  -     Lipid Panel; Future; Expected date: 04/30/2025  -     Hemoglobin A1C; Future; Expected date: 04/30/2025  -     Microalbumin/Creatinine Ratio, Urine; Future; Expected date: 04/30/2025  -     Comprehensive Metabolic Panel; Future; Expected date: 04/30/2025  -     Ambulatory referral/consult to Optometry; Future; Expected date: 05/06/2025  -     flash glucose sensor (FREESTYLE MAKI 14 DAY SENSOR) Kit; use and change every 14 days  Dispense: 2 kit; Refill: 11  -     semaglutide (OZEMPIC) 2 mg/dose (8 mg/3 mL) PnIj; Inject 2 mg into the skin every 7 days.  Dispense: 9 mL; Refill: 3    6. Hypertension associated with type 2 diabetes mellitus  Overview:    Lab Results   Component Value Date    K 4.5 12/02/2024     - well controlled  -  continue current management plan   - patient encouraged to notify me with any changes    Orders:  -     CBC Auto Differential; Future; Expected date: 04/30/2025    7. Hyperlipidemia associated with type 2 diabetes mellitus  Overview:  Lab Results   Component Value Date    LDLCALC 78.6 08/26/2024     - LDL goal <70 near goal  - continue rosuvastatin 20 mg daily but he does not want to because his angiogram did not show blockages. I had a discussion with Sj Gonzalez that he does have atherosclerosis of his LE and coronaries and though no blockage warranting surgical intervention statins are the best at prevention of worsening of atherosclerosis that can cause occlusion/blockages    Orders:  -     Lipid Panel; Future; Expected date: 04/30/2025  -     rosuvastatin (CRESTOR) 20 MG tablet; Take 1 tablet (20 mg total) by mouth once daily.  Dispense: 90 tablet; Refill: 3    8. 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  No intervention  - goal LDL <70  - BP goal < 130/80  - ASA 81 mg daily  - A1C goal </=7%    Orders:  -     rosuvastatin (CRESTOR) 20 MG tablet; Take 1 tablet (20 mg total) by mouth once daily.  Dispense: 90 tablet; Refill: 3    9. Erectile dysfunction, unspecified erectile dysfunction type  Overview:  - he has been evaluated by urology  - stable    Orders:  -     tadalafiL (CIALIS) 5 MG tablet; Take 1 tablet (5 mg total) by mouth once daily.  Dispense: 90 tablet; Refill: 3          ORDERS:   Orders Placed This Encounter    X-Ray Hand Complete Right    Lipid Panel    Hemoglobin A1C    Microalbumin/Creatinine Ratio, Urine    Comprehensive Metabolic Panel    CBC Auto Differential    Ambulatory referral/consult to Optometry    Ambulatory  referral/consult to ENT    Ambulatory referral/consult to Rheumatology    Ambulatory referral/consult to Audiology    flash glucose sensor (FREESTYLE MAKI 14 DAY SENSOR) Kit    semaglutide (OZEMPIC) 2 mg/dose (8 mg/3 mL) PnIj    tadalafiL (CIALIS) 5 MG tablet    rosuvastatin (CRESTOR) 20 MG tablet    amoxicillin-clavulanate 875-125mg (AUGMENTIN) 875-125 mg per tablet    neomycin-polymyxin-hydrocortisone (CORTISPORIN) 3.5-10,000-1 mg/mL-unit/mL-% otic suspension       Vaccines recommended: he declined all    - code applied: Today's visit included increased complexity associated with the care of the episodic problem  addressed and managing the longitudinal care of the patient due to the serious and/or complex managed problem(s) . --the management of which requires the direction of a practitioner with specialized clinical knowledge, skill, and expertise.     Follow up in about 3 months (around 7/30/2025) for Virtual Visit, diabetes. or sooner with any concerns      This encounter was dictated and transcribed using DeepScribe and [employer], please excuse any typographical or grammatical errors.    Dr. Idalia Alexandra D.O.   Family Medicine         [1]   Social History  Tobacco Use    Smoking status: Never     Passive exposure: Never    Smokeless tobacco: Never   Substance Use Topics    Alcohol use: Yes     Comment: daily    Drug use: No

## 2025-04-30 ENCOUNTER — OFFICE VISIT (OUTPATIENT)
Dept: PRIMARY CARE CLINIC | Facility: CLINIC | Age: 70
End: 2025-04-30
Attending: FAMILY MEDICINE
Payer: MEDICARE

## 2025-04-30 VITALS
SYSTOLIC BLOOD PRESSURE: 130 MMHG | OXYGEN SATURATION: 96 % | HEART RATE: 79 BPM | WEIGHT: 244.25 LBS | DIASTOLIC BLOOD PRESSURE: 77 MMHG | HEIGHT: 72 IN | BODY MASS INDEX: 33.08 KG/M2

## 2025-04-30 DIAGNOSIS — H66.001 NON-RECURRENT ACUTE SUPPURATIVE OTITIS MEDIA OF RIGHT EAR WITHOUT SPONTANEOUS RUPTURE OF TYMPANIC MEMBRANE: Primary | ICD-10-CM

## 2025-04-30 DIAGNOSIS — I15.2 HYPERTENSION ASSOCIATED WITH TYPE 2 DIABETES MELLITUS: ICD-10-CM

## 2025-04-30 DIAGNOSIS — E11.65 TYPE 2 DIABETES MELLITUS WITH HYPERGLYCEMIA, WITH LONG-TERM CURRENT USE OF INSULIN: ICD-10-CM

## 2025-04-30 DIAGNOSIS — Z79.4 TYPE 2 DIABETES MELLITUS WITH HYPERGLYCEMIA, WITH LONG-TERM CURRENT USE OF INSULIN: ICD-10-CM

## 2025-04-30 DIAGNOSIS — I70.263 ATHEROSCLEROSIS OF NATIVE ARTERY OF BOTH LOWER EXTREMITIES WITH GANGRENE: ICD-10-CM

## 2025-04-30 DIAGNOSIS — E11.69 HYPERLIPIDEMIA ASSOCIATED WITH TYPE 2 DIABETES MELLITUS: ICD-10-CM

## 2025-04-30 DIAGNOSIS — E78.5 HYPERLIPIDEMIA ASSOCIATED WITH TYPE 2 DIABETES MELLITUS: ICD-10-CM

## 2025-04-30 DIAGNOSIS — E11.59 HYPERTENSION ASSOCIATED WITH TYPE 2 DIABETES MELLITUS: ICD-10-CM

## 2025-04-30 DIAGNOSIS — G89.29 CHRONIC HAND PAIN, RIGHT: ICD-10-CM

## 2025-04-30 DIAGNOSIS — N52.9 ERECTILE DYSFUNCTION, UNSPECIFIED ERECTILE DYSFUNCTION TYPE: ICD-10-CM

## 2025-04-30 DIAGNOSIS — E11.52 TYPE 2 DIABETES MELLITUS WITH DIABETIC PERIPHERAL ANGIOPATHY AND GANGRENE, WITHOUT LONG-TERM CURRENT USE OF INSULIN: ICD-10-CM

## 2025-04-30 DIAGNOSIS — H91.91 DECREASED HEARING OF RIGHT EAR: ICD-10-CM

## 2025-04-30 DIAGNOSIS — M79.641 CHRONIC HAND PAIN, RIGHT: ICD-10-CM

## 2025-04-30 PROBLEM — B00.1 HERPES LABIALIS: Status: RESOLVED | Noted: 2023-03-29 | Resolved: 2025-04-30

## 2025-04-30 PROCEDURE — 99214 OFFICE O/P EST MOD 30 MIN: CPT | Mod: S$PBB,,, | Performed by: FAMILY MEDICINE

## 2025-04-30 PROCEDURE — 99214 OFFICE O/P EST MOD 30 MIN: CPT | Mod: PBBFAC,PN | Performed by: FAMILY MEDICINE

## 2025-04-30 PROCEDURE — G2211 COMPLEX E/M VISIT ADD ON: HCPCS | Mod: S$PBB,,, | Performed by: FAMILY MEDICINE

## 2025-04-30 PROCEDURE — 99999 PR PBB SHADOW E&M-EST. PATIENT-LVL IV: CPT | Mod: PBBFAC,,, | Performed by: FAMILY MEDICINE

## 2025-04-30 RX ORDER — ROSUVASTATIN CALCIUM 20 MG/1
20 TABLET, COATED ORAL DAILY
Qty: 90 TABLET | Refills: 3 | Status: SHIPPED | OUTPATIENT
Start: 2025-04-30 | End: 2026-04-30

## 2025-04-30 RX ORDER — SEMAGLUTIDE 2.68 MG/ML
2 INJECTION, SOLUTION SUBCUTANEOUS
COMMUNITY
End: 2025-04-30

## 2025-04-30 RX ORDER — AMOXICILLIN AND CLAVULANATE POTASSIUM 875; 125 MG/1; MG/1
1 TABLET, FILM COATED ORAL 2 TIMES DAILY
Qty: 20 TABLET | Refills: 0 | Status: SHIPPED | OUTPATIENT
Start: 2025-04-30 | End: 2025-05-10

## 2025-04-30 RX ORDER — NEOMYCIN SULFATE, POLYMYXIN B SULFATE AND HYDROCORTISONE 10; 3.5; 1 MG/ML; MG/ML; [USP'U]/ML
3 SUSPENSION/ DROPS AURICULAR (OTIC) 4 TIMES DAILY
Qty: 10 ML | Refills: 0 | Status: SHIPPED | OUTPATIENT
Start: 2025-04-30

## 2025-04-30 RX ORDER — FLASH GLUCOSE SENSOR
2 KIT MISCELLANEOUS
Qty: 2 KIT | Refills: 11 | Status: SHIPPED | OUTPATIENT
Start: 2025-04-30

## 2025-04-30 RX ORDER — TADALAFIL 5 MG/1
5 TABLET ORAL DAILY
Qty: 90 TABLET | Refills: 3 | Status: SHIPPED | OUTPATIENT
Start: 2025-04-30 | End: 2026-04-30

## 2025-04-30 RX ORDER — SEMAGLUTIDE 2.68 MG/ML
2 INJECTION, SOLUTION SUBCUTANEOUS
Qty: 9 ML | Refills: 3 | Status: SHIPPED | OUTPATIENT
Start: 2025-04-30 | End: 2026-04-30

## 2025-05-01 ENCOUNTER — TELEPHONE (OUTPATIENT)
Dept: PRIMARY CARE CLINIC | Facility: CLINIC | Age: 70
End: 2025-05-01

## 2025-05-01 NOTE — TELEPHONE ENCOUNTER
BMI was above normal measurement. Current weight: 75.8 kg (167 lb 1.6 oz)  Weight change since last visit (-) denotes wt loss 7.1 lbs   Weight loss needed to achieve BMI 25: 47.7 Lbs  Weight loss needed to achieve BMI 30: 23.9 Lbs  Provided instructions on dietary changes.   Spoke with pt, they had a verbal understanding.

## 2025-05-01 NOTE — TELEPHONE ENCOUNTER
----- Message from Med Assistant Naidu sent at 5/1/2025  1:03 PM CDT -----  Name of Who is Calling: Kailey wife of RICK SHUKLA [[id number]]  What is the request in detail: Pt wife is requesting x-ray of his hand to be put back in the system and the pt will go to the [facility] to get it done. Please assist.  Can the clinic reply by [facility]: No  [facility] Back [facility]: [phone]

## 2025-05-07 ENCOUNTER — TELEPHONE (OUTPATIENT)
Dept: PRIMARY CARE CLINIC | Facility: CLINIC | Age: 70
End: 2025-05-07
Payer: MEDICARE

## 2025-05-07 NOTE — TELEPHONE ENCOUNTER
Paperwork completed for CGM from [employer] and last note attached    Dr. Idalia Alexandra D.O.   [facility]

## 2025-05-08 ENCOUNTER — PATIENT OUTREACH (OUTPATIENT)
Dept: ADMINISTRATIVE | Facility: HOSPITAL | Age: 70
End: 2025-05-08
Payer: MEDICARE

## 2025-05-09 ENCOUNTER — PATIENT OUTREACH (OUTPATIENT)
Dept: ADMINISTRATIVE | Facility: HOSPITAL | Age: 70
End: 2025-05-09
Payer: MEDICARE

## 2025-05-09 NOTE — PROGRESS NOTES
Health maintenance reviewed, updated and links triggered. DM Eye exam due ([facility]) 5/9/25  Pcp appt 7/2/25 lab appt 6/27/25  Care Coordination Encounter Details:       MyChart Portal Status:         [x]  Reviewed MyChart Portal Status offered / enrolled if applicable        Additional Notes:     MyChart Outcomes: Pt is enrolled & active          Updates Requested / Reviewed:        Care Everywhere         Health Maintenance Screening(s) Due:      Health Maintenance Topics Overdue:      VBHM Score: 0     Patient is not due for any topics at this time.    Pneumonia Vaccine  Shingles/Zoster Vaccine  RSV Vaccine                  Health Maintenance Topic(s) Outreach Outcomes & Actions Taken:    Primary Care Appt - Outreach Outcomes & Actions Taken  : Primary Care Appt Scheduled Pcp appt 7/2/25    Lab(s) - Outreach Outcomes & Actions Taken  : Overdue Lab(s) Scheduled Lab appt 6/27/25                  Additional Notes:  Health maintenance reviewed, updated and links triggered. DM Eye exam due ([facility]) 5/9/25  Pcp appt 7/2/25 lab appt 6/27/25           Chronic Disease Management:     Diabetes Measures        Lab Results   Component Value Date    HGBA1C 9.2 (H) 12/02/2024           [x]  Reviewed chart for active Diabetes diagnosis     [x]  Scheduled necessary follow up appointments if needed         Additional Notes:   lab appt 6/27/25           Hypertension Measures        BP Readings from Last 1 Encounters:   04/30/25 130/77           [x]  Reviewed chart for active Hypertension diagnosis     [x]  Reviewed & documented Home BP Cuff     [x]  Documented a Remote BP if needed & applicable     [x]  Scheduled necessary follow up appointments with Primary Care if needed         Additional Notes:  Pcp appt 7/2/25           Provider Team Continuity:     Last PCP Visit Date: 4/30/2025          [x]  Reviewed Primary Care Provider Visits, Annual Wellness Visit, and Future          Appointments to ensure appointments have been  scheduled and/or           completed        Additional Notes:             Social Determinants of Health          [x]  Reviewed, completed, and/or updated the following sections:                  Food Insecurity, Transportation Needs, Financial Resource Strain,                 Tobacco Use        Additional Notes:             Care Management, Digital Medicine, and/or Education Referrals    OPCM Risk Score: 15.7                 Additional Notes:

## 2025-05-28 ENCOUNTER — OFFICE VISIT (OUTPATIENT)
Dept: OTOLARYNGOLOGY | Facility: CLINIC | Age: 70
End: 2025-05-28
Payer: MEDICARE

## 2025-05-28 ENCOUNTER — CLINICAL SUPPORT (OUTPATIENT)
Dept: OTOLARYNGOLOGY | Facility: CLINIC | Age: 70
End: 2025-05-28
Payer: MEDICARE

## 2025-05-28 VITALS
WEIGHT: 242.31 LBS | DIASTOLIC BLOOD PRESSURE: 84 MMHG | BODY MASS INDEX: 32.86 KG/M2 | HEART RATE: 75 BPM | SYSTOLIC BLOOD PRESSURE: 136 MMHG

## 2025-05-28 DIAGNOSIS — H66.001 NON-RECURRENT ACUTE SUPPURATIVE OTITIS MEDIA OF RIGHT EAR WITHOUT SPONTANEOUS RUPTURE OF TYMPANIC MEMBRANE: ICD-10-CM

## 2025-05-28 DIAGNOSIS — H93.11 TINNITUS OF RIGHT EAR: ICD-10-CM

## 2025-05-28 DIAGNOSIS — H90.A31 MIXED CONDUCTIVE AND SENSORINEURAL HEARING LOSS OF RIGHT EAR WITH RESTRICTED HEARING OF LEFT EAR: Primary | ICD-10-CM

## 2025-05-28 DIAGNOSIS — H90.A31 MIXED CONDUCTIVE AND SENSORINEURAL HEARING LOSS OF RIGHT EAR WITH RESTRICTED HEARING OF LEFT EAR: ICD-10-CM

## 2025-05-28 DIAGNOSIS — H90.A22 SENSORINEURAL HEARING LOSS (SNHL) OF LEFT EAR WITH RESTRICTED HEARING OF RIGHT EAR: ICD-10-CM

## 2025-05-28 DIAGNOSIS — H61.23 BILATERAL IMPACTED CERUMEN: ICD-10-CM

## 2025-05-28 DIAGNOSIS — H60.501 ACUTE OTITIS EXTERNA OF RIGHT EAR, UNSPECIFIED TYPE: Primary | ICD-10-CM

## 2025-05-28 PROCEDURE — 99211 OFF/OP EST MAY X REQ PHY/QHP: CPT | Mod: PBBFAC,PN,25

## 2025-05-28 PROCEDURE — 99999 PR PBB SHADOW E&M-EST. PATIENT-LVL I: CPT | Mod: PBBFAC,,,

## 2025-05-28 PROCEDURE — 87070 CULTURE OTHR SPECIMN AEROBIC: CPT | Performed by: NURSE PRACTITIONER

## 2025-05-28 PROCEDURE — 69210 REMOVE IMPACTED EAR WAX UNI: CPT | Mod: PBBFAC,PN | Performed by: NURSE PRACTITIONER

## 2025-05-28 PROCEDURE — 99999 PR PBB SHADOW E&M-EST. PATIENT-LVL IV: CPT | Mod: PBBFAC,,, | Performed by: NURSE PRACTITIONER

## 2025-05-28 PROCEDURE — 92567 TYMPANOMETRY: CPT | Mod: PBBFAC,PN

## 2025-05-28 PROCEDURE — 92557 COMPREHENSIVE HEARING TEST: CPT | Mod: PBBFAC,PN

## 2025-05-28 PROCEDURE — 99214 OFFICE O/P EST MOD 30 MIN: CPT | Mod: PBBFAC,27,PN,25 | Performed by: NURSE PRACTITIONER

## 2025-05-28 RX ORDER — CLOTRIMAZOLE 1 G/ML
SOLUTION TOPICAL
Qty: 10 ML | Refills: 0 | Status: SHIPPED | OUTPATIENT
Start: 2025-05-28

## 2025-05-28 NOTE — PROCEDURES
Ear Cerumen Removal    Date/Time: 5/28/2025 2:20 PM    Performed by: Dominique Lennon NP  Authorized by: Dominique Lennon NP    Consent Done?:  Yes (Verbal)    Local anesthetic:  None  Location details:  Both ears  Procedure type: curette    Procedure type comment:  Suction  Cerumen  Removal Results:  Cerumen completely removed  Patient tolerance:  Patient tolerated the procedure well with no immediate complications

## 2025-05-28 NOTE — PROGRESS NOTES
Sj Gonzalez, a 69 y.o. male was seen today in the clinic for an audiologic evaluation. The patient's primary complaint was decreased hearing perception of right ear with recently diagnosed ear infection.  The following information was gathered through patient interview and/or record review:    Previous Audio: 3-21-24;  normal hearing at 250-2000 Hz sloping to moderate sensorineural hearing loss, bilaterally  Hearing Aids use: none  History of noise exposure: denied  Tinnitus: denied  Ear pain: denied  Dizziness: denied    Otoscopy revealed drainage of right EAC; cerumen of left EAC. Pure tone testing revealed normal sloping to severe mixed hearing loss in the right ear and normal sloping to moderate sensorineural hearing loss in the left ear. Speech reception thresholds were obtained at 20 dBHL in the right ear and 0 dBHL in the left ear. Speech discrimination scores were 100% in the right ear and 100% in the left ear. Tympanometry revealed Type A tympanograms in both ears.     Results were reviewed with the patient and the recommendation of a hearing aid consultation after medical clearance was briefly discussed.    Recommendations:  Otologic evaluation  Annual audiologic evaluation  Hearing protection in noise

## 2025-05-30 LAB — BACTERIA SPEC AEROBE CULT: NORMAL

## 2025-06-02 ENCOUNTER — TELEPHONE (OUTPATIENT)
Dept: OTOLARYNGOLOGY | Facility: CLINIC | Age: 70
End: 2025-06-02
Payer: MEDICARE

## 2025-06-02 ENCOUNTER — RESULTS FOLLOW-UP (OUTPATIENT)
Dept: OTOLARYNGOLOGY | Facility: CLINIC | Age: 70
End: 2025-06-02

## 2025-06-02 NOTE — PROGRESS NOTES
Please let Mr. Gustafson know that his ear culture was negative. I still recommend using the clotrimazole drops as prescribed based on the appearance of the drainage. Follow up as scheduled.

## 2025-06-10 ENCOUNTER — HOSPITAL ENCOUNTER (OUTPATIENT)
Dept: RADIOLOGY | Facility: HOSPITAL | Age: 70
Discharge: HOME | End: 2025-06-10
Attending: FAMILY MEDICINE
Payer: MEDICARE

## 2025-06-10 ENCOUNTER — TELEPHONE (OUTPATIENT)
Dept: PRIMARY CARE CLINIC | Facility: CLINIC | Age: 70
End: 2025-06-10
Payer: MEDICARE

## 2025-06-10 PROCEDURE — 73130 X-RAY EXAM OF HAND: CPT | Mod: TC,RT

## 2025-06-10 PROCEDURE — 73130 X-RAY EXAM OF HAND: CPT | Mod: 26,RT,, | Performed by: RADIOLOGY

## 2025-06-10 NOTE — TELEPHONE ENCOUNTER
----- Message from Med Assistant Woo sent at 6/10/2025  1:44 PM CDT -----  Type: Patient Call BackWho called: SelfWhat is the request in detail: pt. Is requesting his wrist be x-rayed as well .. States he's having a lot of pain today with it .. Can the clinic reply by [facility]?NOWould the patient rather a call back or a response via [facility]? Yes, call Best call back number: [phone]

## 2025-06-10 NOTE — TELEPHONE ENCOUNTER
Attempted to contact pt 3 times inregards to offer appointment . No answer was given out of all attempts. No Voicemail option was available on any number on file. Unable to reach pt.

## 2025-06-11 ENCOUNTER — RESULTS FOLLOW-UP (OUTPATIENT)
Dept: PRIMARY CARE CLINIC | Facility: CLINIC | Age: 70
End: 2025-06-11

## 2025-06-17 ENCOUNTER — NURSE TRIAGE (OUTPATIENT)
Dept: ADMINISTRATIVE | Facility: CLINIC | Age: 70
End: 2025-06-17
Payer: MEDICARE

## 2025-06-17 ENCOUNTER — OCHSNER VIRTUAL EMERGENCY DEPARTMENT (OUTPATIENT)
Facility: CLINIC | Age: 70
End: 2025-06-17
Payer: MEDICARE

## 2025-06-17 ENCOUNTER — PATIENT OUTREACH (OUTPATIENT)
Facility: OTHER | Age: 70
End: 2025-06-17
Payer: MEDICARE

## 2025-06-17 NOTE — PROGRESS NOTES
"Patient's spouse spoke with [facility] RN on 6/17/2025 with complaint of "Wife states pt his toes are turning black right and left foot. Pt was in the hospital for a week for the same problem a while back - like four years ago. He was told if this ever happened again to "come into the hospital right away." Pt denies pain or fever."    [facility] RN consulted with [facility] provider, Dr. Moore and disposition recommended was emergency department.  Will follow up with patient during the week of June 18-23, 2025 to assess if patient received the care he was needing.    "

## 2025-06-17 NOTE — TELEPHONE ENCOUNTER
"Wife states pt his toes are turning black right and left foot. Pt was in the hospital for a week for the same problem a while back - like four years ago. He was told if this ever happened again to "come into the hospital right away." Pt denies pain or fever. They are wanting to see a podiatrist. Dispo is see physician within 4 hours ( or PCP triage).     [facility] provider, Taj Moore MD, notified- "The ideal for them is to go to ED." Pt verbalized understanding.  Reason for Disposition   [1] Purple or black skin on foot or toe AND [2] new or increased    Additional Information   Negative: Sounds like a life-threatening emergency to the triager   Negative: SEVERE pain (e.g., excruciating)   Negative: Entire foot is cool or blue in comparison to other side   Negative: [1] Looks infected (spreading redness, red streak, pus) AND [2] fever   Negative: Patient sounds very sick or weak to the triager   Negative: Fever > 100.4 F (38.0 C)   Negative: [1] Drainage from foot AND [2] new or increased   Negative: [1] Foul-smelling odor from foot AND [2] new or increased   Negative: [1] Spreading redness or red streak AND [2] area > 2 inches (5 cm)    Protocols used: Diabetes - Foot Problems and Dimbywscb-B-GL    "

## 2025-06-25 ENCOUNTER — PATIENT MESSAGE (OUTPATIENT)
Dept: PRIMARY CARE CLINIC | Facility: CLINIC | Age: 70
End: 2025-06-25
Payer: MEDICARE

## 2025-06-25 ENCOUNTER — RESULTS FOLLOW-UP (OUTPATIENT)
Dept: UROLOGY | Facility: CLINIC | Age: 70
End: 2025-06-25

## 2025-06-25 ENCOUNTER — TELEPHONE (OUTPATIENT)
Dept: UROLOGY | Facility: CLINIC | Age: 70
End: 2025-06-25
Payer: MEDICARE

## 2025-06-25 ENCOUNTER — OFFICE VISIT (OUTPATIENT)
Dept: INTERNAL MEDICINE | Facility: CLINIC | Age: 70
End: 2025-06-25
Payer: MEDICARE

## 2025-06-25 VITALS
SYSTOLIC BLOOD PRESSURE: 110 MMHG | BODY MASS INDEX: 33.47 KG/M2 | HEIGHT: 72 IN | WEIGHT: 247.13 LBS | HEART RATE: 71 BPM | OXYGEN SATURATION: 97 % | DIASTOLIC BLOOD PRESSURE: 60 MMHG

## 2025-06-25 DIAGNOSIS — I15.2 HYPERTENSION ASSOCIATED WITH TYPE 2 DIABETES MELLITUS: ICD-10-CM

## 2025-06-25 DIAGNOSIS — E11.52 TYPE 2 DIABETES MELLITUS WITH DIABETIC PERIPHERAL ANGIOPATHY AND GANGRENE, WITHOUT LONG-TERM CURRENT USE OF INSULIN: ICD-10-CM

## 2025-06-25 DIAGNOSIS — E11.59 HYPERTENSION ASSOCIATED WITH TYPE 2 DIABETES MELLITUS: ICD-10-CM

## 2025-06-25 DIAGNOSIS — M19.041 PRIMARY OSTEOARTHRITIS OF RIGHT HAND: Primary | ICD-10-CM

## 2025-06-25 PROCEDURE — 99999PBSHW PR PBB SHADOW TECHNICAL ONLY FILED TO HB: Mod: JZ,PBBFAC,,

## 2025-06-25 PROCEDURE — 99999 PR PBB SHADOW E&M-EST. PATIENT-LVL IV: CPT | Mod: PBBFAC,,, | Performed by: COUNSELOR

## 2025-06-25 PROCEDURE — 96372 THER/PROPH/DIAG INJ SC/IM: CPT | Mod: PBBFAC

## 2025-06-25 PROCEDURE — 99214 OFFICE O/P EST MOD 30 MIN: CPT | Mod: S$PBB,,, | Performed by: COUNSELOR

## 2025-06-25 PROCEDURE — 99214 OFFICE O/P EST MOD 30 MIN: CPT | Mod: PBBFAC | Performed by: COUNSELOR

## 2025-06-25 RX ORDER — DICLOFENAC SODIUM 10 MG/G
2 GEL TOPICAL DAILY
Qty: 100 G | Refills: 0 | Status: SHIPPED | OUTPATIENT
Start: 2025-06-25

## 2025-06-25 RX ORDER — KETOROLAC TROMETHAMINE 30 MG/ML
15 INJECTION, SOLUTION INTRAMUSCULAR; INTRAVENOUS
Status: COMPLETED | OUTPATIENT
Start: 2025-06-25 | End: 2025-06-25

## 2025-06-25 RX ADMIN — KETOROLAC TROMETHAMINE 15 MG: 30 INJECTION, SOLUTION INTRAMUSCULAR; INTRAVENOUS at 04:06

## 2025-06-25 NOTE — TELEPHONE ENCOUNTER
Staff called pt to schedule appt. Pt was informed of appt time/date. Pt confirmed and voiced understanding.

## 2025-06-25 NOTE — PROGRESS NOTES
Subjective:       Patient ID: Sj Gonzalez is a 69 y.o. male with history of lumbar DDD, BPPV, HTN, HLD, atherosclerosis, ED, type 2 diabetes, chronic right hand pain.    Chief Complaint: Primary osteoarthritis of right hand [M19.041]    Patient is new to me, PCP is Dr. Idalia Alexandra. Here today for the following:    History of Present Illness    CHIEF COMPLAINT:  Patient presents today for significant hand pain.    HAND PAIN AND DYSFUNCTION:  He reports significant hand pain with difficulty fully closing the right hand, noting limited range of motion compared to his left hand. He has a history of trigger finger of the right fourth digit. He experienced severe wrist pain one week ago during an X-ray but currently denies active wrist pain. The hand area is tender with bruising present. The hand dysfunction impacts his daily activities. He expresses concern about potential underlying conditions such as infection or rheumatoid arthritis. He has good sensation in his hands. No surgical intervention has been pursued for the previous trigger finger condition.    Right hand X-ray 6/10/25 showed osteoarthritic changes at 1st-3rd MCP, thumb IP, 2nd and 3rd IP and PIP articulations.   Scheduled to see rheum in 09/2025.     DIABETES:  His recent A1C has improved to 6.9%. He recently experienced a significant hypoglycemic episode with glucose dropping to 40, accompanied by sweating and near-syncope when he did not eat or drink coffee in the morning. He reports diabetes-related skin changes including rapid blister formation within hours, which he self-manages by popping before they become too large. He has a history of severe toe and nail complications from four years ago. He currently maintains good sensation in hands and feet.    BLOOD PRESSURE:  He reports home blood pressure readings in the 130s. He acknowledges medication non-compliance, occasionally missing doses for an entire day when leaving medication in his  "pocket.    ROS:  General: -fever, -chills  Cardiovascular: -chest pain, -palpitations, -lower extremity edema  Respiratory: -shortness of breath  Gastrointestinal: -abdominal pain, -nausea, -vomiting, -diarrhea, -constipation, -blood in stool  Musculoskeletal: +joint pain, -muscle pain, +limb pain  Skin: -rash, +lesion  Neurological: -numbness, -tingling, -decreased sensation in extremities  Endocrine: +lightheaded if meals are missed            Current Outpatient Medications   Medication Instructions    acyclovir 5% (ZOVIRAX) 5 % ointment Topical (Top), 5 times daily    amLODIPine (NORVASC) 10 mg, Oral    BASAGLAR KWIKPEN U-100 INSULIN 35 Units, Subcutaneous, Daily    clotrimazole (LOTRIMIN) 1 % Soln Apply 5 to right ear twice daily x 14 days    diclofenac sodium (VOLTAREN ARTHRITIS PAIN) 2 g, Topical (Top), Daily    flash glucose scanning reader (FREESTYLE MAKI 14 DAY READER) Misc use once daily    flash glucose sensor (FREESTYLE MAKI 14 DAY SENSOR) Kit use and change every 14 days    neomycin-polymyxin-hydrocortisone (CORTISPORIN) 3.5-10,000-1 mg/mL-unit/mL-% otic suspension 3 drops, Right Ear, 4 times daily    olmesartan-hydrochlorothiazide (BENICAR HCT) 40-25 mg per tablet 1 tablet, Oral, Daily    OZEMPIC 2 mg, Subcutaneous, Every 7 days    rosuvastatin (CRESTOR) 20 mg, Oral, Daily    SYRINGE 3CC/21GX1" 1 Units, Misc.(Non-Drug; Combo Route), Weekly    SYRINGE 3CC/22GX1" 1 Units, Misc.(Non-Drug; Combo Route), Every 28 days    tadalafiL (CIALIS) 5 mg, Oral, Daily    testosterone cypionate (DEPOTESTOTERONE CYPIONATE) 200 mg, Intramuscular, Every 14 days    valACYclovir (VALTREX) 1000 MG tablet 1 tab po BID x 5 days prn cold sore     Objective:      Vitals:    06/25/25 1521   BP: 110/60   Pulse: 71   SpO2: 97%   Weight: 112.1 kg (247 lb 2.2 oz)   Height: 6' (1.829 m)   PainSc: (S)   2   PainLoc: (S) Hand     Body mass index is 33.52 kg/m².    Physical Exam  Vitals reviewed.   Constitutional:       General: He is " not in acute distress.     Appearance: Normal appearance. He is well-developed. He is obese. He is not ill-appearing or diaphoretic.   HENT:      Head: Normocephalic and atraumatic.      Nose: Nose normal.   Eyes:      General: No scleral icterus.        Right eye: No discharge.         Left eye: No discharge.   Neck:      Thyroid: No thyromegaly.      Trachea: No tracheal deviation.   Cardiovascular:      Comments: Radial pulses 2+ bilaterally  Pulmonary:      Effort: Pulmonary effort is normal.   Musculoskeletal:         General: No deformity.      Cervical back: Neck supple.      Right lower leg: No edema.      Left lower leg: No edema.      Comments: Right hand nontender to palpation.   Good passive ROM of fingers and wrist, but active  slightly limited.  Right fourth finger had slight locking when extending after full flexion.   Skin:     General: Skin is warm and dry.      Capillary Refill: Capillary refill takes less than 2 seconds.      Findings: Lesion present. No erythema or rash.      Comments: Multiple small open lesions of various healing stages across fingers and hands bilaterally.   One on right third MCP dorsum was slightly erythematous without swelling, discharge, tenderness, warmth. Otherwise, rest were nonerythematous, nontender, without swelling.      Neurological:      Mental Status: He is alert and oriented to person, place, and time. Mental status is at baseline.      Motor: No weakness.      Gait: Gait normal.   Psychiatric:         Mood and Affect: Mood normal.         Behavior: Behavior normal.         Thought Content: Thought content normal.         Judgment: Judgment normal.         Assessment:       1. Primary osteoarthritis of right hand    2. Type 2 diabetes mellitus with diabetic peripheral angiopathy and gangrene, without long-term current use of insulin    3. Hypertension associated with type 2 diabetes mellitus        IMPRESSION:  - Patient presents with hand pain and arthritic  changes evident on XR.  - Considered non-surgical management options for hand arthritis and trigger finger.  - Diabetes control improved, with A1C of 6.9%.  - Evaluated risk factors for NSAID use, including cardiac and GI history.    Plan:     PLAN SUMMARY:  - Apply Aquaphor or Vaseline to sores to prevent infection and promote healing  - Administer Toradol injection in clinic for immediate pain relief  - Recommend Voltaren gel for ongoing symptom management  - Refer to hand specialist for evaluation of arthritis and trigger finger for possible injectable medication before rheum evaluation.  - Refer to occupational therapy for hand exercises and motion improvement strategies  - Bring home BP machine to next appointment for calibration check  - Follow-up with office if hand pain worsens or signs of infection develop    Primary osteoarthritis of right hand  - Patient experiencing difficulty closing hand, trigger finger symptoms (finger stays open and clicks when moved), and wrist pain.  - Administered Toradol injection in clinic for immediate pain relief  - Recommend Voltaren gel for ongoing symptom management.  - Explained the difference between aspirin (in Goody's powder) and ibuprofen, discussing potential risks of long-term NSAID use including GI ulceration.  - Referred to hand specialist for evaluation of arthritis and trigger finger, and to occupational therapy for hand exercises and motion improvement strategies.  - Instructed patient to contact office if hand pain worsens or if signs of infection develop.  -     diclofenac sodium (VOLTAREN ARTHRITIS PAIN) 1 % Gel; Apply 2 g topically once daily.  Dispense: 100 g; Refill: 0  -     Ambulatory referral/consult to Hand Surgery; Future; Expected date: 07/02/2025  -     ketorolac injection 15 mg  -     Ambulatory Referral/Consult to Occupational Therapy    Type 2 diabetes mellitus with diabetic peripheral angiopathy and gangrene, without long-term current use of  insulin  - Monitored patient's hypoglycemic episode with glucose level dropping to 40, causing sweating and near fainting.  - A1C is currently 6.9%, indicating improved diabetes control.  - Continue current medication regimen and follow up with PCP.   - Monitored rapidly forming blisters attributed to diabetes that heal slowly.  - Recommend keeping sores covered with Aquaphor or Vaseline to prevent infection and promote healing.    Hypertension associated with type 2 diabetes mellitus  - Blood pressure readings are sometimes higher (around 130s), especially when medication is missed.  - Will continue amlodipine for blood pressure control.  - Requested patient to bring home BP machine to next appointment for calibration check.    This note was generated with the assistance of ambient listening technology. Verbal consent was obtained by the patient and accompanying visitor(s) for the recording of patient appointment to facilitate this note. I attest to having reviewed and edited the generated note for accuracy, though some syntax or spelling errors may persist. Please contact the author of this note for any clarification.    Bobby Olea PA-C    6/25/2025

## 2025-06-25 NOTE — PROGRESS NOTES
"Per order of Lefty ROJAS , patient to receive 15mg/mL/30mg/mL of Kenalog (triamcinolone acetonide). Injection given by Evelio Farrar LPN. The area of injection was palpated using the medial fold and the iliac crest as anatomical landmarks. Patient was advised to relax the muscle. The area was cleaned with alcohol and allowed to dry. Using a 25g 1.5" needle, 15mL of Kenalog (triamcinolone acetonide) 30mg/mL was placed intramuscularly into the Left upper outer gluteal quadrant. Patient experienced no complications and was discharged in stable condition. Kenalog Lot: [id number] Exp: 11/30/2025     "

## 2025-06-26 PROBLEM — H66.001 NON-RECURRENT ACUTE SUPPURATIVE OTITIS MEDIA OF RIGHT EAR WITHOUT SPONTANEOUS RUPTURE OF TYMPANIC MEMBRANE: Status: RESOLVED | Noted: 2025-04-30 | Resolved: 2025-06-26

## 2025-06-26 NOTE — PROGRESS NOTES
VIRTUAL/TELEMEDICINE VISIT   FAMILY MEDICINE  [facility]    The patient location is: [place]  The chief complaint leading to consultation is:   Chief Complaint   Patient presents with    Diabetes     Visit type: Virtual visit with synchronous audio and video   Total time spent: 30 minute  Each patient to whom he or she provides medical services by telemedicine is:  (1) informed of the relationship between the physician and patient and the respective role of any other health care provider with respect to management of the patient; and (2) notified that he or she may decline to receive medical services by telemedicine and may withdraw from such care at any time.    Reason for visit:   Chief Complaint   Patient presents with    Diabetes       SUBJECTIVE: Sj Gonzalez is a 70 y.o. male  - obesity, hypertension, hyperlipidemia, type 2 diabetes, lumbar degenerative disc disease, and peripheral arterial disease presents for f/u diabetes    Cardiology: Dr. Jeovany Hart   Podiatry Dr. Zepeda  Gastroenterology: Dr. Hodges  Urology Dr. Reno Gustafson reports a recent ER evaluation for a suspected blood blister on his toe. The ER explained that the long nail hitting the front of his shoe possibly caused a hematoma underneath the nail. The condition was not as severe as a similar incident 4 years ago when the skin and tissue of the toe had turned black. Currently, the affected area looks the same as when evaluated at the ER. He has been keeping his nails short to prevent further trauma. He was referred to Podiatry. He reports healing well.     He mentions difficulty losing weight, struggling to get below 230 lbs. He is currently taking Ozempic 2 mg for weight management and diabetes control. He would like to change to Mounjaro    He complains of hand arthritis, making it difficult to close his hand. He no longer has trigger finger, but the arthritis still affects his hand function. A friend  suggested cortisone injections for relief, which he is interested in exploring. Sj Gonzalez was seen by IM 6/25/25 and referred to PT and Ortho Hand. He has appt pending with Dr. Keith Nelson 7/28/25 and appt with Rheumatology 9/4/25    Sj Gonzalez's glucose on his lab was less than 50.  He reports he has not had any episodes of hypoglycemia at home.      1. Diabetes Type 2     Current Outpatient Medications  flash glucose scanning reader (FREESTYLE JAELYN 14 DAY READER) Misc, use once daily, Disp: 1 each, Rfl: 0  flash glucose sensor (FREESTYLE JAELYN 14 DAY SENSOR) Kit, use and change every 14 days, Disp: 2 kit, Rfl: 11  insulin (BASAGLAR KWIKPEN U-100 INSULIN) glargine 100 units/mL SubQ pen, Inject 35 Units into the skin once daily.  semaglutide (OZEMPIC) 2 mg/dose (8 mg/3 mL) PnIj, Inject 2 mg into the skin every 7 days., Disp: 9 mL, Rfl: 3    Side effects from treatment:  denies      Prior medication:   Metformin (stomach cramps)  Trulicity (too expensive)   Glipizide (discontinue started Trulicity with Basaglar)  Pioglitazone (cost)  Ozempic (donut hole)   Mounjaro (not covered)    Complications of diabetes: history of diabetic foot ulcer healed; PAD     Glucometer: FreeStyle Jaelyn 3  Glucose monitoring: premeal and bedtime    Lab Results       Component                Value               Date                       HGBA1C                   6.9 (H)             06/25/2025              Lab Results       Component                Value               Date                       HGBA1C                   9.2 (H)             12/02/2024              Lab Results       Component                Value               Date                       HGBA1C                   7.7 (H)             08/26/2024                    Low dose statin: Rosuvastatin   Last eye exam: 8/4/25 appt pending with Dr. Kristin Almodovar  Last foot exam: 04/30/2025             Review of Systems   All other systems reviewed and are negative.        HISTORY:    Past Medical History:   Diagnosis Date    COVID-19 04/12/2022    - COVID-19 positive - counseling on management COVID-19 - COVID risk score equals 5 - Recommend Paxlovid    Diabetes mellitus     Epigastric pain 09/07/2022    Generalized abdominal pain 03/16/2022    - unknown etiology - reviewed x-ray, abdominal ultrasound and colonoscopy - recommend CT scan abdomen pelvis - recommend re-evaluation by gastroenterology    Heart murmur     MVP    Herpes labialis 03/29/2023    - well controlled  - continue current management plan   - patient encouraged to notify me with any changes      Hypertension     Non-recurrent unilateral inguinal hernia without obstruction or gangrene 09/30/2021    Type 2 diabetes mellitus with foot ulcer, with long-term current use of insulin 11/09/2021    Lab Results  Component  Value  Date     HGBA1C  8.5 (H)  11/20/2023     - +PAD  - ulcer resolved    Urinary tract infection        Past Surgical History:   Procedure Laterality Date    AORTOGRAPHY WITH SERIALOGRAPHY N/A 11/04/2021    Procedure: AORTOGRAM, WITH SERIALOGRAPHY;  Surgeon: Jeovany Hart III, MD;  Location: [facility] CATH LAB;  Service: Cardiology;  Laterality: N/A;    ARTHROSCOPY OF KNEE Right     COLONOSCOPY N/A 09/25/2020    Procedure: COLONOSCOPY;  Surgeon: Vicky Hodges MD;  Location: [facility] ENDO;  Service: Endoscopy;  Laterality: N/A;    ESOPHAGOGASTRODUODENOSCOPY N/A 09/16/2022    Procedure: EGD (ESOPHAGOGASTRODUODENOSCOPY);  Surgeon: Vicky Hodges MD;  Location: [facility] ENDO;  Service: Endoscopy;  Laterality: N/A;    HERNIA REPAIR      ROBOT-ASSISTED LAPAROSCOPIC REPAIR OF INGUINAL HERNIA Right 09/30/2021    Procedure: ROBOTIC REPAIR, HERNIA, INGUINAL;  Surgeon: Carmelo Farooq MD;  Location: [facility] OR;  Service: General;  Laterality: Right;    SPINE SURGERY      Lumbar       Family History   Problem Relation Name Age of Onset    No Known Problems Mother      No Known Problems Father      Diabetes Sister 3      Diabetes Brother 1     No Known Problems Daughter 1     Cancer Neg Hx      Heart disease Neg Hx      Prostate cancer Neg Hx      Kidney disease Neg Hx         Social History[1]    Social History     Social History Narrative    He lives in [place]. He lives with his wife and 2 children. His children are attending [employer]. He lived [place]. . Nonsmoker. Social drinker. 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 His son attended [employer].        ALLERGIES:   Review of patient's allergies indicates:   Allergen Reactions    Metformin      GI intolerance    Lisinopril        MEDS:   Current Outpatient Medications on File Prior to Visit   Medication Sig Dispense Refill Last Dose/Taking    aspirin (ECOTRIN) 81 MG EC tablet Take 81 mg by mouth once daily.   Taking    acyclovir 5% (ZOVIRAX) 5 % ointment Apply topically 5 (five) times daily. (Patient not taking: Reported on 11/6/2024) 15 g 11     amLODIPine (NORVASC) 10 MG tablet Take 1 tablet by mouth once daily 90 tablet 2     diclofenac sodium (VOLTAREN ARTHRITIS PAIN) 1 % Gel Apply 2 g topically once daily. 100 g 0     flash glucose scanning reader (KidBookSTYLE MAKI 14 DAY READER) Misc use once daily 1 each 0     flash glucose sensor (FREESTYLE MAKI 14 DAY SENSOR) Kit use and change every 14 days 2 kit 11     insulin glargine U-100, Lantus, (BASAGLAR KWIKPEN U-100 INSULIN) 100 unit/mL (3 mL) InPn pen Inject 35 Units into the skin once daily. 30 mL 3     neomycin-polymyxin-hydrocortisone (CORTISPORIN) 3.5-10,000-1 mg/mL-unit/mL-% otic suspension Place 3 drops into the right ear 4 (four) times daily. (Patient not taking: Reported on 6/25/2025) 10 mL 0     olmesartan-hydrochlorothiazide (BENICAR HCT) 40-25 mg per tablet Take 1 tablet by mouth once daily. 90 tablet 3     rosuvastatin (CRESTOR) 20 MG tablet Take 1 tablet (20 mg total) by mouth once daily. 90 tablet 3     semaglutide (OZEMPIC) 2 mg/dose (8 mg/3 mL) PnIj Inject 2 mg into the skin every 7 days. 9 mL  "3     syringe with needle (SYRINGE 3CC/21GX1") 3 mL 21 gauge x 1" Syrg 1 Units by Misc.(Non-Drug; Combo Route) route once a week. 100 each 1     syringe with needle (SYRINGE 3CC/22GX1") 3 mL 22 gauge x 1" Syrg 1 Units by Misc.(Non-Drug; Combo Route) route every 28 days. 100 each 2     tadalafiL (CIALIS) 5 MG tablet Take 1 tablet (5 mg total) by mouth once daily. 90 tablet 3     testosterone cypionate (DEPOTESTOTERONE CYPIONATE) 200 mg/mL injection Inject 1 mL (200 mg total) into the muscle every 14 (fourteen) days. 5 mL 5     valACYclovir (VALTREX) 1000 MG tablet 1 tab po BID x 5 days prn cold sore (Patient not taking: Reported on 6/25/2025) 60 tablet 11     [DISCONTINUED] clotrimazole (LOTRIMIN) 1 % Soln Apply 5 to right ear twice daily x 14 days (Patient not taking: Reported on 6/25/2025) 10 mL 0        Vital signs:   There were no vitals filed for this visit.  There is no height or weight on file to calculate BMI.    PHYSICAL EXAM:     Physical Exam  Constitutional:       General: He is not in acute distress.  Pulmonary:      Effort: Pulmonary effort is normal. No respiratory distress.   Neurological:      Mental Status: He is alert.   Psychiatric:         Speech: Speech normal.           PERTINENT RESULTS:   No visits with results within 1 Week(s) from this visit.   Latest known visit with results is:   Lab Visit on 06/25/2025   Component Date Value Ref Range Status    Testosterone Total 06/25/2025 248 (L)  304 - 1,227 ng/dL Final    Cholesterol Total 06/25/2025 118 (L)  120 - 199 mg/dL Final    The National Cholesterol Education Program (NCEP) has set the  following guidelines (reference ranges) for Cholesterol:  Optimal.....................<200 mg/dL  Borderline High.............200-239 mg/dL  High........................> or = 240 mg/dL    Triglyceride 06/25/2025 40  30 - 150 mg/dL Final    The National Cholesterol Education Program (NCEP) has set the  following guidelines (reference values) for " triglycerides:  Normal......................<150 mg/dL  Borderline High.............150-199 mg/dL  High........................200-499 mg/dL    HDL Cholesterol 06/25/2025 48  40 - 75 mg/dL Final    The National Cholesterol Education Program (NCEP) has set the   following guidelines (reference values) for HDL Cholesterol:   Low...............<40 mg/dL   Optimal...........>60 mg/dL    LDL Cholesterol 06/25/2025 62.0 (L)  63.0 - 159.0 mg/dL Final    The National Cholesterol Education Program (NCEP) has set the  following guidelines (reference values) for LDL Cholesterol:  Optimal.......................<130 mg/dL  Borderline High...............130-159 mg/dL  High..........................160-189 mg/dL  Very High.....................>190 mg/dL  LDL calculated using the Friedewald equation.    HDL/Cholesterol Ratio 06/25/2025 40.7  20.0 - 50.0 % Final    Cholesterol/HDL Ratio 06/25/2025 2.5  2.0 - 5.0 Final    Non HDL Cholesterol 06/25/2025 70  mg/dL Final    Risk category and Non-HDL cholesterol goals:  Coronary heart disease (CHD)or equivalent (10-year risk of CHD >20%):  Non-HDL cholesterol goal     <130 mg/dL  Two or more CHD risk factors and 10-year risk of CHD <= 20%:  Non-HDL cholesterol goal     <160 mg/dL  0 to 1 CHD risk factor:  Non-HDL cholesterol goal     <190 mg/dL    Hemoglobin A1c 06/25/2025 6.9 (H)  4.0 - 5.6 % Final    ADA Screening Guidelines:  5.7-6.4%  Consistent with prediabetes  >=6.5%  Consistent with diabetes    High levels of fetal hemoglobin interfere with the HbA1C  assay. Heterozygous hemoglobin variants (HbS, HgC, etc)do  not significantly interfere with this assay.   However, presence of multiple variants may affect accuracy.    Estimated Average Glucose 06/25/2025 151 (H)  68 - 131 mg/dL Final    Urine Microalbumin 06/25/2025 46.0    ug/mL Final    Urine Creatinine 06/25/2025 115.0  23.0 - 375.0 mg/dL Final    Microalbumin/Creatinine Ratio Urine 06/25/2025 40.0 (H)  <=30.0 ug/mg Final     Sodium 06/25/2025 140  136 - 145 mmol/L Final    Potassium 06/25/2025 3.8  3.5 - 5.1 mmol/L Final    Chloride 06/25/2025 110  95 - 110 mmol/L Final    CO2 06/25/2025 23  23 - 29 mmol/L Final    Glucose 06/25/2025 54 (L)  70 - 110 mg/dL Final    BUN 06/25/2025 21  8 - 23 mg/dL Final    Creatinine 06/25/2025 0.9  0.5 - 1.4 mg/dL Final    Calcium 06/25/2025 9.0  8.7 - 10.5 mg/dL Final    Protein Total 06/25/2025 7.7  6.0 - 8.4 gm/dL Final    Albumin 06/25/2025 3.9  3.5 - 5.2 g/dL Final    Bilirubin Total 06/25/2025 0.6  0.1 - 1.0 mg/dL Final    For infants and newborns, interpretation of results should be based   on gestational age, weight and in agreement with clinical   observations.    Premature Infant recommended reference ranges:   0-24 hours:  <8.0 mg/dL   24-48 hours: <12.0 mg/dL   3-5 days:    <15.0 mg/dL   6-29 days:   <15.0 mg/dL    ALP 06/25/2025 77  40 - 150 unit/L Final    AST 06/25/2025 37  11 - 45 unit/L Final    ALT 06/25/2025 45 (H)  10 - 44 unit/L Final    Anion Gap 06/25/2025 7 (L)  8 - 16 mmol/L Final    eGFR 06/25/2025 >60  >60 mL/min/1.73/m2 Final    Estimated GFR calculated using the CKD-EPI creatinine (2021) equation.    WBC 06/25/2025 7.40  3.90 - 12.70 K/uL Final    RBC 06/25/2025 4.53 (L)  4.60 - 6.20 M/uL Final    HGB 06/25/2025 15.6  14.0 - 18.0 gm/dL Final    HCT 06/25/2025 43.2  40.0 - 54.0 % Final    MCV 06/25/2025 95  82 - 98 fL Final    MCH 06/25/2025 34.4 (H)  27.0 - 31.0 pg Final    MCHC 06/25/2025 36.1 (H)  32.0 - 36.0 g/dL Final    RDW 06/25/2025 11.7  11.5 - 14.5 % Final    Platelet Count 06/25/2025 205  150 - 450 K/uL Final    MPV 06/25/2025 9.5  9.2 - 12.9 fL Final    Nucleated RBC 06/25/2025 0  <=0 /100 WBC Final    Neut % 06/25/2025 57.3  38 - 73 % Final    Lymph % 06/25/2025 27.7  18 - 48 % Final    Mono % 06/25/2025 11.9  4 - 15 % Final    Eos % 06/25/2025 1.8  <=8 % Final    Basophil % 06/25/2025 0.9  <=1.9 % Final    Imm Grans % 06/25/2025 0.4  0.0 - 0.5 % Final    Neut #  06/25/2025 4.24  1.8 - 7.7 K/uL Final    Lymph # 06/25/2025 2.05  1 - 4.8 K/uL Final    Mono # 06/25/2025 0.88  0.3 - 1 K/uL Final    Eos # 06/25/2025 0.13  <=0.5 K/uL Final    Baso # 06/25/2025 0.07  <=0.2 K/uL Final    Imm Grans # 06/25/2025 0.03  0.00 - 0.04 K/uL Final    Mild elevation in immature granulocytes is non specific and can be seen in a variety of conditions including stress response, acute inflammation, trauma and pregnancy. Correlation with other laboratory and clinical findings is essential.       ASSESSMENT/PLAN:    1. Type 2 diabetes mellitus with diabetic microalbuminuria, with long-term current use of insulin  Overview:  Increased albumin/creatinine ratio  - discussed with Sj Gonzalez  - on ARB  - consider adding SLGT-2 but cost has been an issues in the past with him and he has PAD    Orders:  -     Microalbumin/Creatinine Ratio, Urine; Future; Expected date: 10/02/2025    2. 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  Especially if he starts Mounjaro which can be more potent than his Ozempic   -he would like to switch his Ozempic 2 mg weekly to Mounjaro 7.5 mg weekly if covered by his insurance.    Orders:  -     Comprehensive Metabolic Panel; Future; Expected date: 12/26/2025  -     Hemoglobin A1C; Future; Expected date: 12/26/2025  -     tirzepatide (MOUNJARO) 7.5 mg/0.5 mL PnIj; Inject 7.5 mg into the skin every 7 days.  Dispense: 6 mL; Refill: 0    3. Hyperlipidemia associated with type 2 diabetes mellitus  Overview:  Lab Results   Component Value Date    LDLCALC 78.6 08/26/2024     - LDL goal <70 near goal  - continue rosuvastatin 20 mg daily but he does not want to because his angiogram did not show blockages. I had a discussion with  Sj Gonzalez that he does have atherosclerosis of his LE and coronaries and though no blockage warranting surgical intervention statins are the best at prevention of worsening of atherosclerosis that can cause occlusion/blockages      4. Elevated LFTs  Overview:  Lab Results   Component Value Date    ALT 45 (H) 06/25/2025    AST 37 06/25/2025    ALKPHOS 77 06/25/2025    BILITOT 0.6 06/25/2025     - 3/2022 CT abd/pelvisThere is a subcentimeter hypodensity within the right lobe of the liver too small to characterize.   - waxes and wanes  - monitor    Orders:  -     Comprehensive Metabolic Panel; Future; Expected date: 12/26/2025    5. Class 1 obesity due to excess calories with serious comorbidity and body mass index (BMI) of 33.0 to 33.9 in adult  Overview:  - discussed recommendation for diet and cardiovascular exercise  - counseling on lifestyle modifications for risk factor reduction  - okay to switch to Mounjaro                ORDERS:   Orders Placed This Encounter    Comprehensive Metabolic Panel    Hemoglobin A1C    Microalbumin/Creatinine Ratio, Urine    tirzepatide (MOUNJARO) 7.5 mg/0.5 mL PnIj       Vaccines recommended:  COVID-19, RSV, shingles and pneumococcal.  He declines    Follow up in about 4 months (around 11/2/2025) for Virtual Visit, diabetes. or sooner with any concerns      This note is dictated using the M*Modal Fluency Direct word recognition program. There are word recognition mistakes that are occasionally missed on review.    Dr. Idalia Alexandra D.O.   Family Medicine                       [1]   Social History  Tobacco Use    Smoking status: Never     Passive exposure: Never    Smokeless tobacco: Never   Substance Use Topics    Alcohol use: Yes     Comment: daily    Drug use: No

## 2025-07-02 ENCOUNTER — TELEPHONE (OUTPATIENT)
Dept: PRIMARY CARE CLINIC | Facility: CLINIC | Age: 70
End: 2025-07-02

## 2025-07-02 ENCOUNTER — OFFICE VISIT (OUTPATIENT)
Dept: PRIMARY CARE CLINIC | Facility: CLINIC | Age: 70
End: 2025-07-02
Attending: FAMILY MEDICINE
Payer: MEDICARE

## 2025-07-02 DIAGNOSIS — E11.29 TYPE 2 DIABETES MELLITUS WITH DIABETIC MICROALBUMINURIA, WITH LONG-TERM CURRENT USE OF INSULIN: Primary | ICD-10-CM

## 2025-07-02 DIAGNOSIS — E11.69 HYPERLIPIDEMIA ASSOCIATED WITH TYPE 2 DIABETES MELLITUS: ICD-10-CM

## 2025-07-02 DIAGNOSIS — E66.811 CLASS 1 OBESITY DUE TO EXCESS CALORIES WITH SERIOUS COMORBIDITY AND BODY MASS INDEX (BMI) OF 33.0 TO 33.9 IN ADULT: ICD-10-CM

## 2025-07-02 DIAGNOSIS — E66.09 CLASS 1 OBESITY DUE TO EXCESS CALORIES WITH SERIOUS COMORBIDITY AND BODY MASS INDEX (BMI) OF 33.0 TO 33.9 IN ADULT: ICD-10-CM

## 2025-07-02 DIAGNOSIS — Z79.4 TYPE 2 DIABETES MELLITUS WITH DIABETIC PERIPHERAL ANGIOPATHY AND GANGRENE, WITH LONG-TERM CURRENT USE OF INSULIN: ICD-10-CM

## 2025-07-02 DIAGNOSIS — E78.5 HYPERLIPIDEMIA ASSOCIATED WITH TYPE 2 DIABETES MELLITUS: ICD-10-CM

## 2025-07-02 DIAGNOSIS — R79.89 ELEVATED LFTS: ICD-10-CM

## 2025-07-02 DIAGNOSIS — E11.59 HYPERTENSION ASSOCIATED WITH TYPE 2 DIABETES MELLITUS: ICD-10-CM

## 2025-07-02 DIAGNOSIS — R80.9 TYPE 2 DIABETES MELLITUS WITH DIABETIC MICROALBUMINURIA, WITH LONG-TERM CURRENT USE OF INSULIN: Primary | ICD-10-CM

## 2025-07-02 DIAGNOSIS — I15.2 HYPERTENSION ASSOCIATED WITH TYPE 2 DIABETES MELLITUS: ICD-10-CM

## 2025-07-02 DIAGNOSIS — E11.52 TYPE 2 DIABETES MELLITUS WITH DIABETIC PERIPHERAL ANGIOPATHY AND GANGRENE, WITH LONG-TERM CURRENT USE OF INSULIN: ICD-10-CM

## 2025-07-02 DIAGNOSIS — Z79.4 TYPE 2 DIABETES MELLITUS WITH DIABETIC MICROALBUMINURIA, WITH LONG-TERM CURRENT USE OF INSULIN: Primary | ICD-10-CM

## 2025-07-02 PROBLEM — M65.341 TRIGGER RING FINGER OF RIGHT HAND: Status: RESOLVED | Noted: 2024-12-03 | Resolved: 2025-07-02

## 2025-07-02 PROCEDURE — 98006 SYNCH AUDIO-VIDEO EST MOD 30: CPT | Mod: 95,,, | Performed by: FAMILY MEDICINE

## 2025-07-02 RX ORDER — ASPIRIN 81 MG/1
81 TABLET ORAL DAILY
COMMUNITY

## 2025-07-02 RX ORDER — TIRZEPATIDE 7.5 MG/.5ML
7.5 INJECTION, SOLUTION SUBCUTANEOUS
Qty: 6 ML | Refills: 0 | Status: SHIPPED | OUTPATIENT
Start: 2025-07-02 | End: 2025-09-30

## 2025-07-02 NOTE — TELEPHONE ENCOUNTER
----- Message from Idalia Alexandra DO sent at 7/2/2025  8:24 AM CDT -----  Please call Sj Gonzalez and schedule 4 month virtual with nonfasting labs prior to visit

## 2025-07-23 ENCOUNTER — PATIENT MESSAGE (OUTPATIENT)
Dept: ORTHOPEDICS | Facility: CLINIC | Age: 70
End: 2025-07-23
Payer: MEDICARE

## 2025-07-28 ENCOUNTER — OFFICE VISIT (OUTPATIENT)
Dept: ORTHOPEDICS | Facility: CLINIC | Age: 70
End: 2025-07-28
Payer: MEDICARE

## 2025-07-28 VITALS — WEIGHT: 244.5 LBS | HEIGHT: 72 IN | BODY MASS INDEX: 33.12 KG/M2

## 2025-07-28 DIAGNOSIS — M19.041 PRIMARY OSTEOARTHRITIS OF RIGHT HAND: ICD-10-CM

## 2025-07-28 PROCEDURE — 99203 OFFICE O/P NEW LOW 30 MIN: CPT | Mod: S$PBB,,, | Performed by: ORTHOPAEDIC SURGERY

## 2025-07-28 PROCEDURE — 99213 OFFICE O/P EST LOW 20 MIN: CPT | Mod: PBBFAC | Performed by: ORTHOPAEDIC SURGERY

## 2025-07-28 PROCEDURE — 99999 PR PBB SHADOW E&M-EST. PATIENT-LVL III: CPT | Mod: PBBFAC,,, | Performed by: ORTHOPAEDIC SURGERY

## 2025-07-28 NOTE — PROGRESS NOTES
[facility]  History & Physical  Orthopedics    SUBJECTIVE:      Chief Complaint:  Right hand stiffness    Referring Provider: Amilcar Olea P*     History of Present Illness:  Patient is a 70 y.o. right hand dominant male who presents today with complaints of stiffness of the right hand.  The patient notes this has been present for quite some time.  He does not have any significant or overt pain and stiffness primarily to the MCP joints of the right hand is primarily he is concerned today.  He presents for initial evaluation today.     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    Symptoms are aggravated by activity and movement.    Symptoms are alleviated by rest.    Symptoms consist of stiffness.    The patient rates their pain as a mild.    Attempted treatment(s) and/or interventions include activity modifications, rest, anti-inflammatory medications.     The patient denies any fevers, chills, N/V, D/C and presents for evaluation.       Past Medical History:   Diagnosis Date    COVID-19 04/12/2022    - COVID-19 positive - counseling on management COVID-19 - COVID risk score equals 5 - Recommend Paxlovid    Diabetes mellitus     Epigastric pain 09/07/2022    Generalized abdominal pain 03/16/2022    - unknown etiology - reviewed x-ray, abdominal ultrasound and colonoscopy - recommend CT scan abdomen pelvis - recommend re-evaluation by gastroenterology    Heart murmur     MVP    Herpes labialis 03/29/2023    - well controlled  - continue current management plan   - patient encouraged to notify me with any changes      Hypertension     Non-recurrent unilateral inguinal hernia without obstruction or gangrene 09/30/2021    Type 2 diabetes mellitus with foot ulcer, with long-term current use of insulin 11/09/2021    Lab Results  Component  Value  Date     HGBA1C  8.5 (H)  11/20/2023     - +PAD  - ulcer resolved    Urinary tract infection      Past Surgical History:    Procedure Laterality Date    AORTOGRAPHY WITH SERIALOGRAPHY N/A 11/04/2021    Procedure: AORTOGRAM, WITH SERIALOGRAPHY;  Surgeon: Jeovany Hart III, MD;  Location: [facility] CATH LAB;  Service: Cardiology;  Laterality: N/A;    ARTHROSCOPY OF KNEE Right     COLONOSCOPY N/A 09/25/2020    Procedure: COLONOSCOPY;  Surgeon: Vicky Hodges MD;  Location: [facility] ENDO;  Service: Endoscopy;  Laterality: N/A;    ESOPHAGOGASTRODUODENOSCOPY N/A 09/16/2022    Procedure: EGD (ESOPHAGOGASTRODUODENOSCOPY);  Surgeon: Vicky Hodges MD;  Location: [facility] ENDO;  Service: Endoscopy;  Laterality: N/A;    HERNIA REPAIR      ROBOT-ASSISTED LAPAROSCOPIC REPAIR OF INGUINAL HERNIA Right 09/30/2021    Procedure: ROBOTIC REPAIR, HERNIA, INGUINAL;  Surgeon: Carmelo Farooq MD;  Location: [facility] OR;  Service: General;  Laterality: Right;    SPINE SURGERY      Lumbar     Review of patient's allergies indicates:   Allergen Reactions    Metformin      GI intolerance    Lisinopril      Social History     Social History Narrative    He lives in [facility]. He lives with his wife and 2 children. His children are attending [employer]. He lived [place]. . Nonsmoker. Social drinker. 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 His son attended [employer].      Family History   Problem Relation Name Age of Onset    No Known Problems Mother      No Known Problems Father      Diabetes Sister 3     Diabetes Brother 1     No Known Problems Daughter 1     Cancer Neg Hx      Heart disease Neg Hx      Prostate cancer Neg Hx      Kidney disease Neg Hx         Current Medications[1]      Review of Systems:  As per HPI otherwise noncontributory    OBJECTIVE:      Vital Signs (Most Recent):  Vitals:    07/28/25 1453   Weight: 110.9 kg (244 lb 7.8 oz)   Height: 6' (1.829 m)     Body mass index is 33.16 kg/m².      Physical Exam:  Constitutional: The patient appears well-developed and well-nourished. No distress.   Skin: No lesions appreciated  Head:  Normocephalic and atraumatic.   Nose: Nose normal.   Ears: No deformities seen  Eyes: Conjunctivae and EOM are normal.   Neck: No tracheal deviation present.   Cardiovascular: Normal rate and intact distal pulses.    Pulmonary/Chest: Effort normal. No respiratory distress.   Abdominal: There is no guarding.   Neurological: The patient is alert.   Psychiatric: The patient has a normal mood and affect.     Right Hand/Wrist Examination:    Observation/Inspection:  Swelling  none    Deformity  arthritic  Discoloration  none     Scars   none    Atrophy  none    HAND/WRIST EXAMINATION:  Finkelstein's Test   Neg  WHAT Test    Neg  Snuff box tenderness   Neg  Espinoza's Test    Neg  Hook of Hamate Tenderness  Neg  CMC grind    Neg  Circumduction test   Neg    Neurovascular Exam:  Digits WWP, brisk CR < 3s throughout  NVI motor/LTS to M/R/U nerves, radial pulse 2+  Tinel's Test - Carpal Tunnel  Neg  Tinel's Test - Cubital Tunnel  Neg  Phalen's Test    Neg  Median Nerve Compression Test Neg    ROM hand full, painless except the right hand MCP joints which are restricted in range motion though not particularly painful    ROM wrist full, painless    ROM elbow full, painless    Abdomen not guarded  Respirations nonlabored  Perfusion intact    Diagnostic Results:     Imaging - I independently viewed the patient's imaging as well as the radiology report.  Xrays of the patient's right hand  demonstrate no evidence of any acute fractures or dislocations with severe right hand 2nd and 3rd MCP arthritic change  EMG - none    ASSESSMENT/PLAN:      70 y.o. 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  We discussed the working diagnosis as well as several other potential alternative diagnoses.  Treatment options were discussed, both conservative and surgical.  Conservative treatment options would include things such as activity modifications, workplace  modifications, a period of rest, oral vs topical OTC and prescription anti-inflammatory medications, occupational therapy, splinting/bracing, immobilization, corticosteroid injections, and others.  Surgical options were discussed as well.     At this time, the patient would like to proceed with a trial of observation.  He does not have any significant pain at this point in time and thus I would not recommend corticosteroid injection and certainly not surgical options.  We did discuss that the only active treatment to help with range motion would be formal occupational therapy but he declines this.  Follow up for any change in symptoms or development of or increase in pain and we can further consider options.    Should the patient's symptoms worsen, persist, or fail to improve they should return for reevaluation and I would be happy to see them back anytime.        Keith Nelson M.D.    Please be aware that this note has been generated with the assistance of [employer] voice-to-text.  Please excuse any spelling or grammatical errors.    Thank you for choosing Dr. Keith Nelson for your orthopedic hand and upper extremity care. It is our goal to provide you with exceptional care that will help keep you healthy, active, and get you back in the game.     If you felt that you received exemplary care today, please consider leaving feedback for Dr. Nelson on [employer] at [email or web address].    Please do not hesitate to reach out to us via email, phone, or MyChart with any questions, concerns, or feedback.           [1]   Current Outpatient Medications:     amLODIPine (NORVASC) 10 MG tablet, Take 1 tablet by mouth once daily, Disp: 90 tablet, Rfl: 2    aspirin (ECOTRIN) 81 MG EC tablet, Take 81 mg by mouth once daily., Disp: , Rfl:     diclofenac sodium (VOLTAREN ARTHRITIS PAIN) 1 % Gel, Apply 2 g topically once daily., Disp: 100 g, Rfl: 0    flash glucose scanning reader (FREESTYLE  "MAKI 14 DAY READER) Misc, use once daily, Disp: 1 each, Rfl: 0    flash glucose sensor (FREESTYLE MAKI 14 DAY SENSOR) Kit, use and change every 14 days, Disp: 2 kit, Rfl: 11    insulin glargine U-100, Lantus, (BASAGLAR KWIKPEN U-100 INSULIN) 100 unit/mL (3 mL) InPn pen, Inject 35 Units into the skin once daily., Disp: 30 mL, Rfl: 3    olmesartan-hydrochlorothiazide (BENICAR HCT) 40-25 mg per tablet, Take 1 tablet by mouth once daily., Disp: 90 tablet, Rfl: 3    rosuvastatin (CRESTOR) 20 MG tablet, Take 1 tablet (20 mg total) by mouth once daily., Disp: 90 tablet, Rfl: 3    syringe with needle (SYRINGE 3CC/21GX1") 3 mL 21 gauge x 1" Syrg, 1 Units by Misc.(Non-Drug; Combo Route) route once a week., Disp: 100 each, Rfl: 1    syringe with needle (SYRINGE 3CC/22GX1") 3 mL 22 gauge x 1" Syrg, 1 Units by Misc.(Non-Drug; Combo Route) route every 28 days., Disp: 100 each, Rfl: 2    tadalafiL (CIALIS) 5 MG tablet, Take 1 tablet (5 mg total) by mouth once daily., Disp: 90 tablet, Rfl: 3    testosterone cypionate (DEPOTESTOTERONE CYPIONATE) 200 mg/mL injection, Inject 1 mL (200 mg total) into the muscle every 14 (fourteen) days., Disp: 5 mL, Rfl: 5    tirzepatide (MOUNJARO) 7.5 mg/0.5 mL PnIj, Inject 7.5 mg into the skin every 7 days., Disp: 6 mL, Rfl: 0    acyclovir 5% (ZOVIRAX) 5 % ointment, Apply topically 5 (five) times daily. (Patient not taking: Reported on 7/28/2025), Disp: 15 g, Rfl: 11    neomycin-polymyxin-hydrocortisone (CORTISPORIN) 3.5-10,000-1 mg/mL-unit/mL-% otic suspension, Place 3 drops into the right ear 4 (four) times daily. (Patient not taking: Reported on 7/28/2025), Disp: 10 mL, Rfl: 0    semaglutide (OZEMPIC) 2 mg/dose (8 mg/3 mL) PnIj, Inject 2 mg into the skin every 7 days. (Patient not taking: Reported on 7/28/2025), Disp: 9 mL, Rfl: 3    valACYclovir (VALTREX) 1000 MG tablet, 1 tab po BID x 5 days prn cold sore (Patient not taking: Reported on 7/28/2025), Disp: 60 tablet, Rfl: 11    "

## 2025-07-31 ENCOUNTER — PATIENT MESSAGE (OUTPATIENT)
Dept: PRIMARY CARE CLINIC | Facility: CLINIC | Age: 70
End: 2025-07-31
Payer: MEDICARE

## 2025-07-31 ENCOUNTER — TELEPHONE (OUTPATIENT)
Dept: PRIMARY CARE CLINIC | Facility: CLINIC | Age: 70
End: 2025-07-31
Payer: MEDICARE

## 2025-07-31 DIAGNOSIS — M79.641 CHRONIC HAND PAIN, RIGHT: Primary | ICD-10-CM

## 2025-07-31 DIAGNOSIS — G89.29 CHRONIC HAND PAIN, RIGHT: Primary | ICD-10-CM

## 2025-07-31 NOTE — TELEPHONE ENCOUNTER
Copied from CRM #[id number]. Topic: General Inquiry - Patient Advice  >> Jul 31, 2025 10:45 AM Renetta wrote:  Type: Patient Call Back    Who called: Kailey/ Spouse    What is the request in detail: requesting recommendation for a hand surgeon    Can the clinic reply by [facility]? no    Would the patient rather a call back or a response via [facility]? call    Best call back [phone]

## 2025-07-31 NOTE — TELEPHONE ENCOUNTER
Orders Placed This Encounter   Procedures    Ambulatory referral/consult to Hand Surgery     Standing Status:   Future     Expected Date:   8/7/2025     Expiration Date:   8/31/2026     Referral Priority:   Routine     Referral Type:   Surgical     Referral Reason:   Specialty Services Required     Referred to Provider:   Keith Nelson MD     Requested Specialty:   Orthopedic Surgery     Number of Visits Requested:   1         Dr. Idalia Alexandra D.O.   Family Medicine

## 2025-07-31 NOTE — TELEPHONE ENCOUNTER
I need more information for what he needs this for.     I recommend Dr. Keith Nelson. If he needs a referral, I need a reason.     Dr. Idalia Alexandra D.O.   Family Medicine

## 2025-08-04 ENCOUNTER — OFFICE VISIT (OUTPATIENT)
Dept: OPTOMETRY | Facility: CLINIC | Age: 70
End: 2025-08-04
Payer: MEDICARE

## 2025-08-04 DIAGNOSIS — H52.03 HYPEROPIA, BILATERAL: ICD-10-CM

## 2025-08-04 DIAGNOSIS — E11.59 HYPERTENSION ASSOCIATED WITH TYPE 2 DIABETES MELLITUS: ICD-10-CM

## 2025-08-04 DIAGNOSIS — E78.5 HYPERLIPIDEMIA ASSOCIATED WITH TYPE 2 DIABETES MELLITUS: ICD-10-CM

## 2025-08-04 DIAGNOSIS — I15.2 HYPERTENSION ASSOCIATED WITH TYPE 2 DIABETES MELLITUS: ICD-10-CM

## 2025-08-04 DIAGNOSIS — H25.13 NS (NUCLEAR SCLEROSIS), BILATERAL: ICD-10-CM

## 2025-08-04 DIAGNOSIS — E11.3299 NPDR (NONPROLIFERATIVE DIABETIC RETINOPATHY): ICD-10-CM

## 2025-08-04 DIAGNOSIS — E11.69 HYPERLIPIDEMIA ASSOCIATED WITH TYPE 2 DIABETES MELLITUS: ICD-10-CM

## 2025-08-04 DIAGNOSIS — E11.65 TYPE 2 DIABETES MELLITUS WITH HYPERGLYCEMIA, WITH LONG-TERM CURRENT USE OF INSULIN: ICD-10-CM

## 2025-08-04 DIAGNOSIS — E11.52 TYPE 2 DIABETES MELLITUS WITH DIABETIC PERIPHERAL ANGIOPATHY AND GANGRENE, WITHOUT LONG-TERM CURRENT USE OF INSULIN: Primary | ICD-10-CM

## 2025-08-04 DIAGNOSIS — H52.4 PRESBYOPIA: ICD-10-CM

## 2025-08-04 DIAGNOSIS — Z79.4 TYPE 2 DIABETES MELLITUS WITH HYPERGLYCEMIA, WITH LONG-TERM CURRENT USE OF INSULIN: ICD-10-CM

## 2025-08-04 PROCEDURE — 99999 PR PBB SHADOW E&M-EST. PATIENT-LVL III: CPT | Mod: PBBFAC,,, | Performed by: OPTOMETRIST

## 2025-08-04 PROCEDURE — 99214 OFFICE O/P EST MOD 30 MIN: CPT | Mod: S$PBB,,, | Performed by: OPTOMETRIST

## 2025-08-04 PROCEDURE — 99213 OFFICE O/P EST LOW 20 MIN: CPT | Mod: PBBFAC,PO | Performed by: OPTOMETRIST

## 2025-08-06 NOTE — PROGRESS NOTES
HPI     Diabetic Eye Exam     Additional comments: Patient Sj Gonzalez is a 70 year old male.           Comments    Patient Sj Gonzalez is here for his annual diabetic ocular health check.   He states that distance VA OU is good with MRX glasses, happy with OTC   +2.50 readers for near. He denies any eye pain, flashes, or floaters in VA   OU.    DLS: 05/01/2024 with Dr. Almodovar  PD: 67.0 mm    Meds: None    POHx:  Type 2 diabetes mellitus with hyperglycemia, with long-term current use of   insulin  Type 2 diabetes mellitus without ophthalmic manifestations  Essential hypertension             NPDR OU              CWS OS              Discussed importance of A1c control               Consult retina for eval  NS (nuclear sclerosis), bilateral  Presbyopia  Hyperopia, bilateral    (+)DM  LBS: 130 currently  Hemoglobin A1C       Date                     Value               Ref Range             Status        06/25/2025               6.9 (H)             4.0 - 5.6 %           Final                           12/02/2024               9.2 (H)             4.0 - 5.6 %           Final                    Last edited by Mey Perez on 8/4/2025  2:58 PM.            Assessment /Plan     For exam results, see Encounter Report.        Type 2 diabetes mellitus with hyperglycemia, with long-term current use of insulin  Type 2 diabetes mellitus without ophthalmic manifestations  Essential hypertension             NPDR OU              CWS OS              Discussed importance of A1c control            unable to obtain clear photos today     NS (nuclear sclerosis), bilateral              Mild, monitor     Presbyopia  Hyperopia, bilateral              Rx specs: discussed MF vs PAL     RTC 6 mo optos

## 2025-08-11 ENCOUNTER — TELEPHONE (OUTPATIENT)
Dept: ORTHOPEDICS | Facility: CLINIC | Age: 70
End: 2025-08-11
Payer: MEDICARE

## 2025-08-12 ENCOUNTER — OFFICE VISIT (OUTPATIENT)
Facility: CLINIC | Age: 70
End: 2025-08-12
Payer: MEDICARE

## 2025-08-12 VITALS — BODY MASS INDEX: 31.36 KG/M2 | WEIGHT: 231.5 LBS | HEIGHT: 72 IN

## 2025-08-12 DIAGNOSIS — G89.29 CHRONIC HAND PAIN, RIGHT: ICD-10-CM

## 2025-08-12 DIAGNOSIS — M19.041 PRIMARY OSTEOARTHRITIS OF RIGHT HAND: Primary | ICD-10-CM

## 2025-08-12 DIAGNOSIS — M79.641 CHRONIC HAND PAIN, RIGHT: ICD-10-CM

## 2025-08-12 PROCEDURE — 99213 OFFICE O/P EST LOW 20 MIN: CPT | Mod: PBBFAC | Performed by: ORTHOPAEDIC SURGERY

## 2025-08-12 PROCEDURE — 99999 PR PBB SHADOW E&M-EST. PATIENT-LVL III: CPT | Mod: PBBFAC,,, | Performed by: ORTHOPAEDIC SURGERY

## 2025-08-12 PROCEDURE — 99214 OFFICE O/P EST MOD 30 MIN: CPT | Mod: S$PBB,,, | Performed by: ORTHOPAEDIC SURGERY

## 2025-08-13 ENCOUNTER — TELEPHONE (OUTPATIENT)
Dept: ORTHOPEDICS | Facility: CLINIC | Age: 70
End: 2025-08-13
Payer: MEDICARE

## 2025-08-18 ENCOUNTER — PATIENT OUTREACH (OUTPATIENT)
Dept: ADMINISTRATIVE | Facility: HOSPITAL | Age: 70
End: 2025-08-18
Payer: MEDICARE

## 2025-09-04 ENCOUNTER — OFFICE VISIT (OUTPATIENT)
Dept: RHEUMATOLOGY | Facility: CLINIC | Age: 70
End: 2025-09-04
Payer: MEDICARE

## 2025-09-04 VITALS
RESPIRATION RATE: 19 BRPM | BODY MASS INDEX: 31.26 KG/M2 | SYSTOLIC BLOOD PRESSURE: 154 MMHG | HEIGHT: 72 IN | OXYGEN SATURATION: 96 % | WEIGHT: 230.81 LBS | HEART RATE: 91 BPM | DIASTOLIC BLOOD PRESSURE: 89 MMHG

## 2025-09-04 DIAGNOSIS — M11.20 CHONDROCALCINOSIS: ICD-10-CM

## 2025-09-04 DIAGNOSIS — M79.642 BILATERAL HAND PAIN: Primary | ICD-10-CM

## 2025-09-04 DIAGNOSIS — M79.641 BILATERAL HAND PAIN: Primary | ICD-10-CM

## 2025-09-04 PROCEDURE — 99204 OFFICE O/P NEW MOD 45 MIN: CPT | Mod: S$PBB,,, | Performed by: STUDENT IN AN ORGANIZED HEALTH CARE EDUCATION/TRAINING PROGRAM

## 2025-09-04 PROCEDURE — 99999 PR PBB SHADOW E&M-EST. PATIENT-LVL V: CPT | Mod: PBBFAC,,, | Performed by: STUDENT IN AN ORGANIZED HEALTH CARE EDUCATION/TRAINING PROGRAM

## 2025-09-04 PROCEDURE — 99215 OFFICE O/P EST HI 40 MIN: CPT | Mod: PBBFAC,PN | Performed by: STUDENT IN AN ORGANIZED HEALTH CARE EDUCATION/TRAINING PROGRAM

## 2025-09-04 RX ORDER — LOSARTAN POTASSIUM AND HYDROCHLOROTHIAZIDE 25; 100 MG/1; MG/1
1 TABLET ORAL
COMMUNITY
Start: 2025-08-13

## (undated) DEVICE — SYS CLSR WND ENDOSCP XL

## (undated) DEVICE — KIT WING PAD POSITIONING

## (undated) DEVICE — DRAPE ARM DAVINCI XI

## (undated) DEVICE — ELECTRODE REM PLYHSV RETURN 9

## (undated) DEVICE — APPLICATOR HEMOBLAST LAPSCP

## (undated) DEVICE — SEE MEDLINE ITEM 156952

## (undated) DEVICE — SUT MCRYL PLUS 4-0 PS2 27IN

## (undated) DEVICE — SEE MEDLINE ITEM 157117

## (undated) DEVICE — BELLOW CANN HEMOBLAST 1.65GR

## (undated) DEVICE — OBTURATOR BLADELESS 8MM XI CLR

## (undated) DEVICE — SEAL UNIVERSAL 5MM-8MM XI

## (undated) DEVICE — COVER PROBE 6X48

## (undated) DEVICE — BAG TISSUE RETRIEVAL 5MM

## (undated) DEVICE — COVER TIP CURVED SCISSORS XI

## (undated) DEVICE — SUT MONOCYRL 4-0 PS2 UND

## (undated) DEVICE — SUT 0 VICRYL / UR6 (J603)

## (undated) DEVICE — SUT V-LOC 90 UD GS22 2-0 9IN

## (undated) DEVICE — GLOVE SURGICAL LATEX SZ 7

## (undated) DEVICE — TRAY FOLEY 16FR INFECTION CONT

## (undated) DEVICE — ADHESIVE DERMABOND ADVANCED